# Patient Record
Sex: MALE | Race: WHITE | NOT HISPANIC OR LATINO | Employment: FULL TIME | URBAN - METROPOLITAN AREA
[De-identification: names, ages, dates, MRNs, and addresses within clinical notes are randomized per-mention and may not be internally consistent; named-entity substitution may affect disease eponyms.]

---

## 2017-04-15 ENCOUNTER — HOSPITAL ENCOUNTER (OUTPATIENT)
Dept: URGENT CARE | Facility: CLINIC | Age: 42
Discharge: HOME | End: 2017-04-15
Attending: FAMILY MEDICINE | Admitting: FAMILY MEDICINE

## 2017-06-14 ENCOUNTER — OFFICE (OUTPATIENT)
Dept: URBAN - METROPOLITAN AREA CLINIC 64 | Facility: CLINIC | Age: 42
End: 2017-06-14

## 2017-06-14 VITALS
SYSTOLIC BLOOD PRESSURE: 144 MMHG | DIASTOLIC BLOOD PRESSURE: 94 MMHG | HEART RATE: 91 BPM | HEIGHT: 68 IN | WEIGHT: 234 LBS

## 2017-06-14 DIAGNOSIS — R10.11 RIGHT UPPER QUADRANT PAIN: ICD-10-CM

## 2017-06-14 DIAGNOSIS — K75.81 NONALCOHOLIC STEATOHEPATITIS (NASH): ICD-10-CM

## 2017-06-14 LAB
ACTIN (SMOOTH MUSCLE) ANTIBODY: 9 UNITS (ref 0–19)
AFP, SERUM, TUMOR MARKER: 4.7 NG/ML (ref 0–8.3)
ALPHA-1-ANTITRYPSIN PHENOTYP: ALPHA-1-ANTITRYPSIN, SERUM: 128 MG/DL (ref 90–200)
ALPHA-1-ANTITRYPSIN PHENOTYP: PHENOTYPE (PI): (no result)
ANTINUCLEAR ANTIBODIES, IFA: NEGATIVE
CERULOPLASMIN: 35.2 MG/DL — HIGH (ref 16–31)
COMP. METABOLIC PANEL (14): A/G RATIO: 1.9 (ref 1.2–2.2)
COMP. METABOLIC PANEL (14): ALBUMIN, SERUM: 5 G/DL (ref 3.5–5.5)
COMP. METABOLIC PANEL (14): ALKALINE PHOSPHATASE, S: 56 IU/L (ref 39–117)
COMP. METABOLIC PANEL (14): ALT (SGPT): 58 IU/L — HIGH (ref 0–44)
COMP. METABOLIC PANEL (14): AST (SGOT): 32 IU/L (ref 0–40)
COMP. METABOLIC PANEL (14): BILIRUBIN, TOTAL: 0.3 MG/DL (ref 0–1.2)
COMP. METABOLIC PANEL (14): BUN/CREATININE RATIO: 13 (ref 9–20)
COMP. METABOLIC PANEL (14): BUN: 11 MG/DL (ref 6–24)
COMP. METABOLIC PANEL (14): CALCIUM, SERUM: 10.1 MG/DL (ref 8.7–10.2)
COMP. METABOLIC PANEL (14): CARBON DIOXIDE, TOTAL: 23 MMOL/L (ref 18–29)
COMP. METABOLIC PANEL (14): CHLORIDE, SERUM: 96 MMOL/L (ref 96–106)
COMP. METABOLIC PANEL (14): CREATININE, SERUM: 0.86 MG/DL (ref 0.76–1.27)
COMP. METABOLIC PANEL (14): EGFR IF AFRICN AM: 125 ML/MIN/1.73 (ref 59–?)
COMP. METABOLIC PANEL (14): EGFR IF NONAFRICN AM: 108 ML/MIN/1.73 (ref 59–?)
COMP. METABOLIC PANEL (14): GLOBULIN, TOTAL: 2.7 G/DL (ref 1.5–4.5)
COMP. METABOLIC PANEL (14): GLUCOSE, SERUM: 84 MG/DL (ref 65–99)
COMP. METABOLIC PANEL (14): POTASSIUM, SERUM: 4.3 MMOL/L (ref 3.5–5.2)
COMP. METABOLIC PANEL (14): PROTEIN, TOTAL, SERUM: 7.7 G/DL (ref 6–8.5)
COMP. METABOLIC PANEL (14): SODIUM, SERUM: 140 MMOL/L (ref 134–144)
FERRITIN, SERUM: 466 NG/ML — HIGH (ref 30–400)
GGT: 70 IU/L — HIGH (ref 0–65)
HEPATITIS PANEL (4): HBSAG SCREEN: NEGATIVE
HEPATITIS PANEL (4): HEP A AB, IGM: NEGATIVE
HEPATITIS PANEL (4): HEP B CORE AB, IGM: NEGATIVE
HEPATITIS PANEL (4): HEP C VIRUS AB: <0.1 S/CO RATIO
IMMUNOGLOBULINS A/G/M, QN, SER: IMMUNOGLOBULIN A, QN, SERUM: 463 MG/DL — HIGH (ref 90–386)
IMMUNOGLOBULINS A/G/M, QN, SER: IMMUNOGLOBULIN G, QN, SERUM: 647 MG/DL — LOW (ref 700–1600)
IMMUNOGLOBULINS A/G/M, QN, SER: IMMUNOGLOBULIN M, QN, SERUM: 83 MG/DL (ref 20–172)
IRON AND TIBC: IRON BIND.CAP.(TIBC): 381 UG/DL (ref 250–450)
IRON AND TIBC: IRON SATURATION: 19 % (ref 15–55)
IRON AND TIBC: IRON, SERUM: 71 UG/DL (ref 38–169)
IRON AND TIBC: UIBC: 310 UG/DL (ref 111–343)
LP+NON-HDL CHOLESTEROL: CHOLESTEROL, TOTAL: 185 MG/DL (ref 100–199)
LP+NON-HDL CHOLESTEROL: COMMENT: (no result)
LP+NON-HDL CHOLESTEROL: HDL CHOLESTEROL: 25 MG/DL — LOW (ref 39–?)
LP+NON-HDL CHOLESTEROL: LDL CHOLESTEROL CALC: 106 MG/DL — HIGH (ref 0–99)
LP+NON-HDL CHOLESTEROL: NON-HDL CHOLESTEROL: 160 MG/DL — HIGH (ref 0–129)
LP+NON-HDL CHOLESTEROL: TRIGLYCERIDES: 269 MG/DL — HIGH (ref 0–149)
LP+NON-HDL CHOLESTEROL: VLDL CHOLESTEROL CAL: 54 MG/DL — HIGH (ref 5–40)
MITOCHONDRIAL (M2) ANTIBODY: <20 UNITS
PROTHROMBIN TIME (PT): INR: 1 (ref 0.8–1.2)
PROTHROMBIN TIME (PT): PROTHROMBIN TIME: 10.4 SEC (ref 9.1–12)
TSH: 3.39 UIU/ML (ref 0.45–4.5)

## 2017-06-14 PROCEDURE — 99243 OFF/OP CNSLTJ NEW/EST LOW 30: CPT | Performed by: INTERNAL MEDICINE

## 2017-06-14 RX ORDER — ESOMEPRAZOLE MAGNESIUM 20 MG/1
22.3 CAPSULE, DELAYED RELEASE ORAL
Qty: 90 | Refills: 5 | Status: COMPLETED
End: 2022-06-27

## 2017-06-16 ENCOUNTER — HOSPITAL ENCOUNTER (OUTPATIENT)
Dept: NUCLEAR MEDICINE | Facility: HOSPITAL | Age: 42
Discharge: HOME | End: 2017-06-16
Attending: INTERNAL MEDICINE | Admitting: INTERNAL MEDICINE

## 2017-07-17 ENCOUNTER — OFFICE (OUTPATIENT)
Dept: URBAN - METROPOLITAN AREA CLINIC 64 | Facility: CLINIC | Age: 42
End: 2017-07-17

## 2017-07-17 VITALS
HEART RATE: 86 BPM | SYSTOLIC BLOOD PRESSURE: 146 MMHG | HEIGHT: 68 IN | DIASTOLIC BLOOD PRESSURE: 92 MMHG | WEIGHT: 237 LBS

## 2017-07-17 DIAGNOSIS — K81.9 CHOLECYSTITIS, UNSPECIFIED: ICD-10-CM

## 2017-07-17 DIAGNOSIS — K75.81 NONALCOHOLIC STEATOHEPATITIS (NASH): ICD-10-CM

## 2017-07-17 DIAGNOSIS — R10.11 RIGHT UPPER QUADRANT PAIN: ICD-10-CM

## 2017-07-17 PROCEDURE — 99213 OFFICE O/P EST LOW 20 MIN: CPT | Performed by: INTERNAL MEDICINE

## 2017-07-28 ENCOUNTER — HOSPITAL ENCOUNTER (OUTPATIENT)
Dept: OTHER | Facility: HOSPITAL | Age: 42
Discharge: HOME | End: 2017-07-28
Attending: SURGERY | Admitting: SURGERY

## 2017-07-28 LAB
ANION GAP SERPL CALC-SCNC: 13.3 MMOL/L (ref 10–20)
BUN SERPL-MCNC: 13 MG/DL (ref 8–20)
BUN/CREAT SERPL: 16.3 (ref 6.2–20.3)
CALCIUM SERPL-MCNC: 9.8 MG/DL (ref 8.9–10.3)
CHLORIDE SERPL-SCNC: 102 MMOL/L (ref 101–111)
CONV CO2: 27 MMOL/L (ref 22–32)
CREAT UR-MCNC: 0.8 MG/DL (ref 0.7–1.2)
GLUCOSE SERPL-MCNC: 89 MG/DL (ref 65–99)
POTASSIUM SERPL-SCNC: 4.3 MMOL/L (ref 3.6–5.1)
SODIUM SERPL-SCNC: 138 MMOL/L (ref 136–144)

## 2017-08-10 ENCOUNTER — HOSPITAL ENCOUNTER (OUTPATIENT)
Dept: OTHER | Facility: HOSPITAL | Age: 42
Setting detail: SPECIMEN
Discharge: HOME | End: 2017-08-10
Attending: SURGERY | Admitting: SURGERY

## 2017-10-26 ENCOUNTER — HOSPITAL ENCOUNTER (OUTPATIENT)
Dept: FAMILY MEDICINE CLINIC | Facility: CLINIC | Age: 42
Setting detail: SPECIMEN
Discharge: HOME | End: 2017-10-26
Attending: INTERNAL MEDICINE | Admitting: INTERNAL MEDICINE

## 2017-10-26 LAB
ALBUMIN SERPL-MCNC: 3.7 G/DL (ref 3.5–4.8)
ALBUMIN/GLOB SERPL: 1.2 {RATIO} (ref 1–1.7)
ALP SERPL-CCNC: 41 IU/L (ref 32–91)
ALT SERPL-CCNC: 70 IU/L (ref 17–63)
ANION GAP SERPL CALC-SCNC: 14.6 MMOL/L (ref 10–20)
AST SERPL-CCNC: 47 IU/L (ref 15–41)
BILIRUB SERPL-MCNC: 0.5 MG/DL (ref 0.3–1.2)
BUN SERPL-MCNC: 14 MG/DL (ref 8–20)
BUN/CREAT SERPL: 10 (ref 6.2–20.3)
CALCIUM SERPL-MCNC: 9.3 MG/DL (ref 8.9–10.3)
CHLORIDE SERPL-SCNC: 103 MMOL/L (ref 101–111)
CK SERPL-CCNC: 51 IU/L (ref 49–397)
CONV CO2: 27 MMOL/L (ref 22–32)
CONV TOTAL PROTEIN: 6.9 G/DL (ref 6.1–7.9)
CREAT UR-MCNC: 1.4 MG/DL (ref 0.7–1.2)
FOLATE SERPL-MCNC: 19.3 NG/ML (ref 5.9–24.8)
GLOBULIN UR ELPH-MCNC: 3.2 G/DL (ref 2.5–3.8)
GLUCOSE SERPL-MCNC: 86 MG/DL (ref 65–99)
MAGNESIUM SERPL-MCNC: 2 MG/DL (ref 1.8–2.5)
POTASSIUM SERPL-SCNC: 4.6 MMOL/L (ref 3.6–5.1)
SODIUM SERPL-SCNC: 140 MMOL/L (ref 136–144)
T3 SERPL-MCNC: 1.18 NG/ML (ref 0.87–1.78)
T4 FREE SERPL-MCNC: 0.89 NG/DL (ref 0.58–1.64)
TSH SERPL-ACNC: 3.64 UIU/ML (ref 0.34–5.6)
VIT B12 SERPL-MCNC: 265 PG/ML (ref 180–914)

## 2018-06-22 ENCOUNTER — HOSPITAL ENCOUNTER (OUTPATIENT)
Dept: FAMILY MEDICINE CLINIC | Facility: CLINIC | Age: 43
Setting detail: SPECIMEN
Discharge: HOME | End: 2018-06-22
Attending: INTERNAL MEDICINE | Admitting: INTERNAL MEDICINE

## 2018-06-22 LAB
ANION GAP SERPL CALC-SCNC: 12.2 MMOL/L (ref 10–20)
BUN SERPL-MCNC: 13 MG/DL (ref 8–20)
BUN/CREAT SERPL: 14.4 (ref 6.2–20.3)
CALCIUM SERPL-MCNC: 10 MG/DL (ref 8.9–10.3)
CHLORIDE SERPL-SCNC: 100 MMOL/L (ref 101–111)
CONV CO2: 28 MMOL/L (ref 22–32)
CREAT UR-MCNC: 0.9 MG/DL (ref 0.7–1.2)
GLUCOSE SERPL-MCNC: 90 MG/DL (ref 65–99)
POTASSIUM SERPL-SCNC: 4.2 MMOL/L (ref 3.6–5.1)
SODIUM SERPL-SCNC: 136 MMOL/L (ref 136–144)
URATE SERPL-MCNC: 6.6 MG/DL (ref 4.8–8.7)

## 2018-09-20 ENCOUNTER — HOSPITAL ENCOUNTER (OUTPATIENT)
Dept: FAMILY MEDICINE CLINIC | Facility: CLINIC | Age: 43
Setting detail: SPECIMEN
Discharge: HOME | End: 2018-09-20
Attending: INTERNAL MEDICINE | Admitting: INTERNAL MEDICINE

## 2018-09-20 LAB
ALBUMIN SERPL-MCNC: 4.1 G/DL (ref 3.5–4.8)
ALBUMIN/GLOB SERPL: 1.5 {RATIO} (ref 1–1.7)
ALP SERPL-CCNC: 51 IU/L (ref 32–91)
ALT SERPL-CCNC: 54 IU/L (ref 17–63)
ANION GAP SERPL CALC-SCNC: 14.1 MMOL/L (ref 10–20)
AST SERPL-CCNC: 38 IU/L (ref 15–41)
BASOPHILS # BLD AUTO: 0.1 10*3/UL (ref 0–0.2)
BASOPHILS NFR BLD AUTO: 1 % (ref 0–2)
BILIRUB SERPL-MCNC: 0.5 MG/DL (ref 0.3–1.2)
BUN SERPL-MCNC: 12 MG/DL (ref 8–20)
BUN/CREAT SERPL: 15 (ref 6.2–20.3)
CALCIUM SERPL-MCNC: 9.5 MG/DL (ref 8.9–10.3)
CHLORIDE SERPL-SCNC: 99 MMOL/L (ref 101–111)
CHOLEST SERPL-MCNC: 175 MG/DL
CHOLEST/HDLC SERPL: 6.3 {RATIO}
CONV CO2: 27 MMOL/L (ref 22–32)
CONV LDL CHOLESTEROL DIRECT: 122 MG/DL (ref 0–100)
CONV TOTAL PROTEIN: 6.9 G/DL (ref 6.1–7.9)
CREAT UR-MCNC: 0.8 MG/DL (ref 0.7–1.2)
DIFFERENTIAL METHOD BLD: (no result)
EOSINOPHIL # BLD AUTO: 0.2 10*3/UL (ref 0–0.3)
EOSINOPHIL # BLD AUTO: 2 % (ref 0–3)
ERYTHROCYTE [DISTWIDTH] IN BLOOD BY AUTOMATED COUNT: 12.8 % (ref 11.5–14.5)
GLOBULIN UR ELPH-MCNC: 2.8 G/DL (ref 2.5–3.8)
GLUCOSE SERPL-MCNC: 96 MG/DL (ref 65–99)
HCT VFR BLD AUTO: 44.1 % (ref 40–54)
HDLC SERPL-MCNC: 28 MG/DL
HGB BLD-MCNC: 15.2 G/DL (ref 14–18)
LDLC/HDLC SERPL: 4.4 {RATIO}
LIPID INTERPRETATION: ABNORMAL
LYMPHOCYTES # BLD AUTO: 2.1 10*3/UL (ref 0.8–4.8)
LYMPHOCYTES NFR BLD AUTO: 22 % (ref 18–42)
MCH RBC QN AUTO: 30.9 PG (ref 26–32)
MCHC RBC AUTO-ENTMCNC: 34.4 G/DL (ref 32–36)
MCV RBC AUTO: 89.8 FL (ref 80–94)
MONOCYTES # BLD AUTO: 0.8 10*3/UL (ref 0.1–1.3)
MONOCYTES NFR BLD AUTO: 8 % (ref 2–11)
NEUTROPHILS # BLD AUTO: 6.6 10*3/UL (ref 2.3–8.6)
NEUTROPHILS NFR BLD AUTO: 67 % (ref 50–75)
NRBC BLD AUTO-RTO: 0 /100{WBCS}
NRBC/RBC NFR BLD MANUAL: 0 10*3/UL
PLATELET # BLD AUTO: 352 10*3/UL (ref 150–450)
PMV BLD AUTO: 8.1 FL (ref 7.4–10.4)
POTASSIUM SERPL-SCNC: 4.1 MMOL/L (ref 3.6–5.1)
RBC # BLD AUTO: 4.91 10*6/UL (ref 4.6–6)
SODIUM SERPL-SCNC: 136 MMOL/L (ref 136–144)
TRIGL SERPL-MCNC: 266 MG/DL
URATE SERPL-MCNC: 6.1 MG/DL (ref 4.8–8.7)
VLDLC SERPL CALC-MCNC: 25.3 MG/DL
WBC # BLD AUTO: 9.7 10*3/UL (ref 4.5–11.5)

## 2019-01-11 ENCOUNTER — HOSPITAL ENCOUNTER (OUTPATIENT)
Dept: URGENT CARE | Facility: CLINIC | Age: 44
Discharge: HOME | End: 2019-01-11
Attending: FAMILY MEDICINE | Admitting: FAMILY MEDICINE

## 2019-07-15 RX ORDER — ALLOPURINOL 300 MG/1
TABLET ORAL
Qty: 180 TABLET | Refills: 0 | Status: SHIPPED | OUTPATIENT
Start: 2019-07-15 | End: 2019-11-07 | Stop reason: SDUPTHER

## 2019-08-21 RX ORDER — GABAPENTIN 100 MG/1
CAPSULE ORAL
Qty: 60 CAPSULE | Refills: 1 | Status: SHIPPED | OUTPATIENT
Start: 2019-08-21 | End: 2019-11-07 | Stop reason: SDUPTHER

## 2019-08-21 RX ORDER — MELOXICAM 15 MG/1
TABLET ORAL
Qty: 30 TABLET | Refills: 4 | Status: SHIPPED | OUTPATIENT
Start: 2019-08-21 | End: 2020-07-14

## 2019-09-23 RX ORDER — LEVOTHYROXINE SODIUM 0.05 MG/1
TABLET ORAL
Qty: 30 TABLET | Refills: 3 | Status: SHIPPED | OUTPATIENT
Start: 2019-09-23 | End: 2020-02-12

## 2019-11-06 RX ORDER — GABAPENTIN 100 MG/1
CAPSULE ORAL
Qty: 60 CAPSULE | Refills: 0 | OUTPATIENT
Start: 2019-11-06

## 2019-11-06 RX ORDER — LISINOPRIL AND HYDROCHLOROTHIAZIDE 25; 20 MG/1; MG/1
TABLET ORAL
Qty: 90 TABLET | Refills: 0 | OUTPATIENT
Start: 2019-11-06

## 2019-11-06 RX ORDER — ALLOPURINOL 300 MG/1
TABLET ORAL
Qty: 180 TABLET | Refills: 0 | OUTPATIENT
Start: 2019-11-06

## 2019-11-06 NOTE — TELEPHONE ENCOUNTER
Patient was called to schedule appt. Patient states that he is out of town for work for the next 2 weeks and is completely out of all three meds. Patient is asking if scripts can be sent to pharmacy for girlfriend to  and mail to him. Patient states that he will call when he is back in town to schedule appt.

## 2019-11-07 RX ORDER — LISINOPRIL AND HYDROCHLOROTHIAZIDE 25; 20 MG/1; MG/1
1 TABLET ORAL DAILY
Qty: 90 TABLET | Refills: 0 | Status: SHIPPED | OUTPATIENT
Start: 2019-11-07 | End: 2020-02-03

## 2019-11-07 RX ORDER — ALLOPURINOL 300 MG/1
600 TABLET ORAL DAILY
Qty: 180 TABLET | Refills: 0 | Status: SHIPPED | OUTPATIENT
Start: 2019-11-07 | End: 2020-02-03

## 2019-11-07 RX ORDER — GABAPENTIN 100 MG/1
100-200 CAPSULE ORAL
Qty: 180 CAPSULE | Refills: 0 | Status: SHIPPED | OUTPATIENT
Start: 2019-11-07 | End: 2020-02-03

## 2019-12-05 ENCOUNTER — TELEPHONE (OUTPATIENT)
Dept: FAMILY MEDICINE CLINIC | Facility: CLINIC | Age: 44
End: 2019-12-05

## 2019-12-05 NOTE — TELEPHONE ENCOUNTER
Does he need to be seen sooner than that? I do want to see him - he's overdue for labs, but as long as he's feeling fine, luciana montanez is fine for follow up visit

## 2019-12-05 NOTE — TELEPHONE ENCOUNTER
Patient said you wanted to see him for a follow up on medications.  Your first available appt, not same day, is 1/14.  Can you see him sooner than that?

## 2019-12-09 NOTE — TELEPHONE ENCOUNTER
Spoke with patient at 5:14pm.  He said he still has the same problem - he cannot feel his feet.  Has been that way for several years.  Can you see him sooner than mid-January?

## 2019-12-09 NOTE — TELEPHONE ENCOUNTER
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

## 2019-12-10 NOTE — TELEPHONE ENCOUNTER
Dr Lucas had an opening on Thursday 12/12 at 1:15pm so I called patient and he wanted that appointment.

## 2019-12-12 ENCOUNTER — OFFICE VISIT (OUTPATIENT)
Dept: FAMILY MEDICINE CLINIC | Facility: CLINIC | Age: 44
End: 2019-12-12

## 2019-12-12 ENCOUNTER — LAB (OUTPATIENT)
Dept: FAMILY MEDICINE CLINIC | Facility: CLINIC | Age: 44
End: 2019-12-12

## 2019-12-12 VITALS
SYSTOLIC BLOOD PRESSURE: 110 MMHG | RESPIRATION RATE: 24 BRPM | BODY MASS INDEX: 33.77 KG/M2 | DIASTOLIC BLOOD PRESSURE: 70 MMHG | OXYGEN SATURATION: 97 % | WEIGHT: 228 LBS | HEART RATE: 98 BPM | TEMPERATURE: 98.3 F | HEIGHT: 69 IN

## 2019-12-12 DIAGNOSIS — Z12.5 PROSTATE CANCER SCREENING: ICD-10-CM

## 2019-12-12 DIAGNOSIS — E03.9 HYPOTHYROIDISM, UNSPECIFIED TYPE: ICD-10-CM

## 2019-12-12 DIAGNOSIS — G62.9 PERIPHERAL POLYNEUROPATHY: ICD-10-CM

## 2019-12-12 DIAGNOSIS — I10 ESSENTIAL HYPERTENSION: Primary | ICD-10-CM

## 2019-12-12 DIAGNOSIS — E55.9 VITAMIN D DEFICIENCY: ICD-10-CM

## 2019-12-12 DIAGNOSIS — M1A.09X0 CHRONIC GOUT OF MULTIPLE SITES, UNSPECIFIED CAUSE: ICD-10-CM

## 2019-12-12 DIAGNOSIS — R73.03 PREDIABETES: ICD-10-CM

## 2019-12-12 DIAGNOSIS — I10 ESSENTIAL HYPERTENSION: ICD-10-CM

## 2019-12-12 LAB
25(OH)D3 SERPL-MCNC: 26.7 NG/ML (ref 30–100)
ALBUMIN SERPL-MCNC: 5.1 G/DL (ref 3.5–5.2)
ALBUMIN/GLOB SERPL: 1.4 G/DL
ALP SERPL-CCNC: 62 U/L (ref 39–117)
ALT SERPL W P-5'-P-CCNC: 64 U/L (ref 1–41)
ANION GAP SERPL CALCULATED.3IONS-SCNC: 17.3 MMOL/L (ref 5–15)
AST SERPL-CCNC: 42 U/L (ref 1–40)
BASOPHILS # BLD AUTO: 0.04 10*3/MM3 (ref 0–0.2)
BASOPHILS NFR BLD AUTO: 0.4 % (ref 0–1.5)
BILIRUB SERPL-MCNC: 0.3 MG/DL (ref 0.2–1.2)
BUN BLD-MCNC: 14 MG/DL (ref 6–20)
BUN/CREAT SERPL: 15.4 (ref 7–25)
CALCIUM SPEC-SCNC: 10.4 MG/DL (ref 8.6–10.5)
CHLORIDE SERPL-SCNC: 95 MMOL/L (ref 98–107)
CHOLEST SERPL-MCNC: 199 MG/DL (ref 0–200)
CO2 SERPL-SCNC: 25.7 MMOL/L (ref 22–29)
CREAT BLD-MCNC: 0.91 MG/DL (ref 0.76–1.27)
DEPRECATED RDW RBC AUTO: 40.5 FL (ref 37–54)
EOSINOPHIL # BLD AUTO: 0.16 10*3/MM3 (ref 0–0.4)
EOSINOPHIL NFR BLD AUTO: 1.6 % (ref 0.3–6.2)
ERYTHROCYTE [DISTWIDTH] IN BLOOD BY AUTOMATED COUNT: 12.3 % (ref 12.3–15.4)
FOLATE SERPL-MCNC: 15.3 NG/ML (ref 4.78–24.2)
GFR SERPL CREATININE-BSD FRML MDRD: 91 ML/MIN/1.73
GLOBULIN UR ELPH-MCNC: 3.7 GM/DL
GLUCOSE BLD-MCNC: 89 MG/DL (ref 65–99)
HBA1C MFR BLD: 5.6 % (ref 3.5–5.6)
HCT VFR BLD AUTO: 46.2 % (ref 37.5–51)
HDLC SERPL-MCNC: 31 MG/DL (ref 40–60)
HGB BLD-MCNC: 16.2 G/DL (ref 13–17.7)
IMM GRANULOCYTES # BLD AUTO: 0.09 10*3/MM3 (ref 0–0.05)
IMM GRANULOCYTES NFR BLD AUTO: 0.9 % (ref 0–0.5)
LDLC SERPL CALC-MCNC: 106 MG/DL (ref 0–100)
LDLC/HDLC SERPL: 3.43 {RATIO}
LYMPHOCYTES # BLD AUTO: 2.33 10*3/MM3 (ref 0.7–3.1)
LYMPHOCYTES NFR BLD AUTO: 23.1 % (ref 19.6–45.3)
MCH RBC QN AUTO: 31.9 PG (ref 26.6–33)
MCHC RBC AUTO-ENTMCNC: 35.1 G/DL (ref 31.5–35.7)
MCV RBC AUTO: 90.9 FL (ref 79–97)
MONOCYTES # BLD AUTO: 0.64 10*3/MM3 (ref 0.1–0.9)
MONOCYTES NFR BLD AUTO: 6.3 % (ref 5–12)
NEUTROPHILS # BLD AUTO: 6.82 10*3/MM3 (ref 1.7–7)
NEUTROPHILS NFR BLD AUTO: 67.7 % (ref 42.7–76)
NRBC BLD AUTO-RTO: 0 /100 WBC (ref 0–0.2)
PLATELET # BLD AUTO: 431 10*3/MM3 (ref 140–450)
PMV BLD AUTO: 10.1 FL (ref 6–12)
POTASSIUM BLD-SCNC: 4.3 MMOL/L (ref 3.5–5.2)
PROT SERPL-MCNC: 8.8 G/DL (ref 6–8.5)
PSA SERPL-MCNC: 0.58 NG/ML (ref 0–4)
RBC # BLD AUTO: 5.08 10*6/MM3 (ref 4.14–5.8)
SODIUM BLD-SCNC: 138 MMOL/L (ref 136–145)
T4 FREE SERPL-MCNC: 1.51 NG/DL (ref 0.93–1.7)
TRIGL SERPL-MCNC: 309 MG/DL (ref 0–150)
TSH SERPL DL<=0.05 MIU/L-ACNC: 3.43 UIU/ML (ref 0.27–4.2)
URATE SERPL-MCNC: 5.9 MG/DL (ref 3.4–7)
VIT B12 BLD-MCNC: >2000 PG/ML (ref 211–946)
VLDLC SERPL-MCNC: 61.8 MG/DL (ref 5–40)
WBC NRBC COR # BLD: 10.08 10*3/MM3 (ref 3.4–10.8)

## 2019-12-12 PROCEDURE — 82306 VITAMIN D 25 HYDROXY: CPT | Performed by: INTERNAL MEDICINE

## 2019-12-12 PROCEDURE — G0103 PSA SCREENING: HCPCS | Performed by: INTERNAL MEDICINE

## 2019-12-12 PROCEDURE — 80053 COMPREHEN METABOLIC PANEL: CPT | Performed by: INTERNAL MEDICINE

## 2019-12-12 PROCEDURE — 82607 VITAMIN B-12: CPT | Performed by: INTERNAL MEDICINE

## 2019-12-12 PROCEDURE — 36415 COLL VENOUS BLD VENIPUNCTURE: CPT

## 2019-12-12 PROCEDURE — 84439 ASSAY OF FREE THYROXINE: CPT | Performed by: INTERNAL MEDICINE

## 2019-12-12 PROCEDURE — 99214 OFFICE O/P EST MOD 30 MIN: CPT | Performed by: INTERNAL MEDICINE

## 2019-12-12 PROCEDURE — 82746 ASSAY OF FOLIC ACID SERUM: CPT | Performed by: INTERNAL MEDICINE

## 2019-12-12 PROCEDURE — 80061 LIPID PANEL: CPT | Performed by: INTERNAL MEDICINE

## 2019-12-12 PROCEDURE — 84550 ASSAY OF BLOOD/URIC ACID: CPT | Performed by: INTERNAL MEDICINE

## 2019-12-12 PROCEDURE — 85025 COMPLETE CBC W/AUTO DIFF WBC: CPT | Performed by: INTERNAL MEDICINE

## 2019-12-12 PROCEDURE — 84443 ASSAY THYROID STIM HORMONE: CPT | Performed by: INTERNAL MEDICINE

## 2019-12-12 PROCEDURE — 83036 HEMOGLOBIN GLYCOSYLATED A1C: CPT | Performed by: INTERNAL MEDICINE

## 2019-12-12 RX ORDER — CHLORAL HYDRATE 500 MG
CAPSULE ORAL
COMMUNITY
End: 2021-01-14

## 2019-12-12 NOTE — PROGRESS NOTES
Subjective   Timo Hector is a 43 y.o. male.     Pt is here for med check of hypothyroidism, htn, peripheral neuropathy, gout  Due for labs today  Did see podiatry  Got shoe inserts - that helped foot pain, but not neuropathy  Seems to be a little worse than before  Not sure if gabapentin helps  Takes it at bedtime so doesn't know if it makes him sleepy  Does have some fatigue, which he attributes to working a lot         The following portions of the patient's history were reviewed and updated as appropriate: allergies, current medications, past family history, past medical history, past social history, past surgical history and problem list.    Review of Systems   Constitutional: Positive for fatigue. Negative for fever.   HENT: Negative for congestion, ear pain, rhinorrhea and sore throat.    Eyes: Negative for blurred vision and itching.   Respiratory: Negative for cough and shortness of breath.    Cardiovascular: Negative for chest pain and palpitations.   Gastrointestinal: Negative for abdominal pain, diarrhea and vomiting.   Endocrine: Negative for polydipsia and polyuria.   Genitourinary: Negative for dysuria, frequency, hematuria and urgency.   Musculoskeletal: Negative for joint swelling and myalgias.   Skin: Negative for rash and skin lesions.   Neurological: Positive for numbness. Negative for dizziness and headache.   Psychiatric/Behavioral: Negative for depressed mood. The patient is not nervous/anxious.          Current Outpatient Medications:   •  allopurinol (ZYLOPRIM) 300 MG tablet, Take 2 tablets by mouth Daily., Disp: 180 tablet, Rfl: 0  •  esomeprazole (nexIUM 24HR) 20 MG capsule, NEXIUM 24HR 20 MG CPDR, Disp: , Rfl:   •  gabapentin (NEURONTIN) 100 MG capsule, Take 1-2 capsules by mouth every night at bedtime., Disp: 180 capsule, Rfl: 0  •  Garlic 100 MG tablet, Take  by mouth., Disp: , Rfl:   •  DORIS BERRY PO, Take  by mouth., Disp: , Rfl:   •  levothyroxine (SYNTHROID, LEVOTHROID)  "50 MCG tablet, TAKE ONE TABLET BY MOUTH DAILY, Disp: 30 tablet, Rfl: 3  •  lisinopril-hydrochlorothiazide (PRINZIDE,ZESTORETIC) 20-25 MG per tablet, Take 1 tablet by mouth Daily., Disp: 90 tablet, Rfl: 0  •  meloxicam (MOBIC) 15 MG tablet, TAKE ONE TABLET BY MOUTH DAILY, Disp: 30 tablet, Rfl: 4  •  Omega-3 Fatty Acids (FISH OIL) 1000 MG capsule capsule, Take  by mouth Daily With Breakfast., Disp: , Rfl:     Objective   /70 (BP Location: Right arm, Patient Position: Sitting, Cuff Size: Large Adult)   Pulse 98   Temp 98.3 °F (36.8 °C) (Oral)   Resp 24   Ht 174 cm (68.5\")   Wt 103 kg (228 lb)   SpO2 97%   BMI 34.16 kg/m²   Physical Exam   Constitutional: He is oriented to person, place, and time. He appears well-developed and well-nourished. No distress.   HENT:   Head: Normocephalic and atraumatic.   Right Ear: External ear normal.   Left Ear: External ear normal.   Mouth/Throat: Oropharynx is clear and moist. No oropharyngeal exudate.   Eyes: Conjunctivae and EOM are normal. Right eye exhibits no discharge. Left eye exhibits no discharge. No scleral icterus.   Neck: Normal range of motion. Neck supple. No thyromegaly present.   Cardiovascular: Normal rate, regular rhythm and normal heart sounds. Exam reveals no gallop and no friction rub.   No murmur heard.  Pulmonary/Chest: Effort normal and breath sounds normal. No respiratory distress. He has no wheezes. He has no rales.   Abdominal: Soft. Bowel sounds are normal. He exhibits no distension. There is no tenderness. There is no guarding.   Musculoskeletal: He exhibits no edema or deformity.   Decreased ROM of lumbar spine   Lymphadenopathy:     He has no cervical adenopathy.   Neurological: He is alert and oriented to person, place, and time. No cranial nerve deficit.   Skin: Skin is warm and dry. No rash noted. He is not diaphoretic. No erythema.   Psychiatric: He has a normal mood and affect. His behavior is normal. Thought content normal.   Vitals " reviewed.        Assessment/Plan   Problems Addressed this Visit     None      Visit Diagnoses     Essential hypertension    -  Primary    Relevant Orders    CBC Auto Differential (Completed)    Comprehensive Metabolic Panel (Completed)    Lipid Panel (Completed)    Chronic gout of multiple sites, unspecified cause        Relevant Orders    Uric acid (Completed)    Prediabetes        Relevant Orders    Hemoglobin A1c (Completed)    Peripheral polyneuropathy        Relevant Orders    Vitamin B12 (Completed)    Folate (Completed)    Ambulatory Referral to Physical Therapy    EMG 2 Limbs    XR Spine Lumbar 2 or 3 View    XR Foot 3+ View Left    XR Foot 3+ View Right    Hypothyroidism, unspecified type        Relevant Orders    TSH (Completed)    T4, free (Completed)    Vitamin D deficiency        Relevant Orders    Vitamin D 25 Hydroxy (Completed)    Prostate cancer screening        Relevant Orders    PSA Screen (Completed)        Will order xrays and emg to eval peripheral neuropathy  Pt does have some back problems, usually sees chiro to help  But hasn't been in a while bc of work  Will try increasing gabapentin to help with sxs while undergoing w/u  If evaluation is normal, consider referral to neuro  Check labs today  bp controlled - cont lisinopril/hctz       Procedures

## 2019-12-16 NOTE — PROGRESS NOTES
Gave pt results. He states he was told a few years ago he has a fatty liver and a biopsy 2-3 years ago. Pt states he no longer drinks alcohol and was told to watch diet.

## 2019-12-27 ENCOUNTER — HOSPITAL ENCOUNTER (OUTPATIENT)
Dept: NEUROLOGY | Facility: HOSPITAL | Age: 44
Discharge: HOME | End: 2019-12-27
Admitting: INTERNAL MEDICINE

## 2019-12-27 DIAGNOSIS — G62.9 PERIPHERAL POLYNEUROPATHY: ICD-10-CM

## 2019-12-27 PROCEDURE — 95909 NRV CNDJ TST 5-6 STUDIES: CPT | Performed by: PSYCHIATRY & NEUROLOGY

## 2019-12-27 PROCEDURE — 95886 MUSC TEST DONE W/N TEST COMP: CPT

## 2019-12-27 PROCEDURE — 95886 MUSC TEST DONE W/N TEST COMP: CPT | Performed by: PSYCHIATRY & NEUROLOGY

## 2019-12-27 PROCEDURE — 95909 NRV CNDJ TST 5-6 STUDIES: CPT

## 2019-12-27 NOTE — PROCEDURES
EMG REPORT    Indication: Numbness of both lower extremities    Findings: Left peroneal motor study showed normal distal latency small amplitude response of 500 µV and a velocity of 33.2 m/s.  Right peroneal bilateral tibial motor study showed no reliable response with distal stimulation.  Left sural sensory study showed no reliable response.    Concentric needle EMG of bilateral vastus lateralis, vastus medialis, anterior tibialis, peroneus and gastrocnemius muscles showed no abnormality.    Impression: Nerve conduction studies are consistent with a severe diffuse polyneuropathy.  Needle EMG of both lower extremities failed to show evidence of superimposed lumbosacral radiculopathy.       Fernando Tellez M.D.

## 2020-01-02 ENCOUNTER — TREATMENT (OUTPATIENT)
Dept: PHYSICAL THERAPY | Facility: CLINIC | Age: 45
End: 2020-01-02

## 2020-01-02 DIAGNOSIS — G62.9 PERIPHERAL POLYNEUROPATHY: Primary | ICD-10-CM

## 2020-01-02 PROCEDURE — 97026 INFRARED THERAPY: CPT | Performed by: PHYSICAL THERAPIST

## 2020-01-02 PROCEDURE — 97110 THERAPEUTIC EXERCISES: CPT | Performed by: PHYSICAL THERAPIST

## 2020-01-02 PROCEDURE — 97162 PT EVAL MOD COMPLEX 30 MIN: CPT | Performed by: PHYSICAL THERAPIST

## 2020-01-02 NOTE — PROGRESS NOTES
"Physical Therapy Initial Evaluation and Plan of Care    Patient: Timo Hector   : 1975  Diagnosis/ICD-10 Code:  Peripheral polyneuropathy [G62.9]  Referring practitioner: Tata Lucas MD  Date of Initial Visit: 2020  Today's Date: 2020  Patient seen for 1 sessions             Subjective 45 yo male with c/o bilateral foot pain and numbness. Pt with insidious onset of symptoms 5-6 years ago. Pt also notes episodes of his R knee \"catching.\" Pt describes stocking pattern. He is taking Neurontin but is unsure if this is helping. Pt reports he is prediabetic but does not think he has seen an endocrinologist.  MD follow up: March  Social hx: Travels for \"bridgework\"      Objective       Neurological Testing     Sensation     Lumbar   Left   Diminished: light touch    Right   Diminished: light touch    Active Range of Motion     Lumbar   Normal active range of motion  Left Ankle/Foot   Dorsiflexion (ke): 5 degrees     Right Ankle/Foot   Dorsiflexion (ke): 12 degrees     Strength/Myotome Testing     Lumbar   Left   Normal strength    Right   Normal strength    Left Ankle/Foot   Normal strength    Right Ankle/Foot   Normal strength    Tests     Lumbar     Left   Negative passive SLR.     Right   Negative passive SLR.     Ambulation     Observational Gait   Decreased walking speed and stride length.   Base of support: increased     Unable to single leg stand with eyes closed, eyes open wfl.  FAAM: 63%    Assessment & Plan     Assessment  Impairments: abnormal gait, abnormal or restricted ROM, impaired balance and lacks appropriate home exercise program  Other impairment: Impaired sensation  Assessment details: 45 yo male with c/o bilateral foot pain and numbness. Pt is with a good response to treatment this date and would benefit from further evaluation and treatment to address the above impairments.  Prognosis: fair  Functional Limitations: walking, pulling, pushing, uncomfortable because of pain " and standing  Goals  Plan Goals: Short term goals, 1 week: Tolerate HEP progression.  Voice compliance with activity modification.  Report improvement in symptoms.    LTGs, 4 weeks: Improve FAAM to 75%.  Improve ankle DF to 10 deg.  Show improvement in single leg standing with eyes closed.  Independent with HEP.    Plan  Therapy options: will be seen for skilled physical therapy services  Other planned modality interventions: Infrared therapy, modalities prn  Planned therapy interventions: balance/weight-bearing training, flexibility, functional ROM exercises, gait training, home exercise program, manual therapy, neuromuscular re-education, strengthening, stretching and therapeutic activities  Frequency: 1-2 times per week.  Duration in visits: 10  Treatment plan discussed with: patient        Timed:         Manual Therapy:         mins  46643;     Therapeutic Exercise:    15     mins  09187;     Neuromuscular Arjun:        mins  57388;    Therapeutic Activity:          mins  48358;     Gait Training:           mins  17910;     Ultrasound:          mins  26707;    Ionto                                   mins   22277  Self Care                            mins   93115    Un-Timed:  Electrical Stimulation:         mins  42101 ( );  Traction          mins 99384  Low Eval          Mins  70913  Mod Eval     30     Mins  90974  High Eval                            Mins  40820  Re-Eval                               mins  98375  Infrared 15 min      Timed [id number]   mins   Total Treatment:     60  mins    PT SIGNATURE: Malvin Zheng PT, DPT, OCS  [place] license: [id number]  DATE TREATMENT INITIATED: 1/2/2020    Initial Certification  Certification Period: 4/1/2020  I certify that the therapy services are furnished while this patient is under my care.  The services outlined above are required by this patient, and will be reviewed every 90 days.     PHYSICIAN: _____________________________    Tata Lucas,  MD      DATE:     Please sign and return via fax to [phone].. Thank you, [facility].

## 2020-01-06 DIAGNOSIS — G62.9 NEUROPATHY: Primary | ICD-10-CM

## 2020-01-10 ENCOUNTER — TREATMENT (OUTPATIENT)
Dept: PHYSICAL THERAPY | Facility: CLINIC | Age: 45
End: 2020-01-10

## 2020-01-10 DIAGNOSIS — G62.9 PERIPHERAL POLYNEUROPATHY: Primary | ICD-10-CM

## 2020-01-10 PROCEDURE — 97110 THERAPEUTIC EXERCISES: CPT | Performed by: PHYSICAL THERAPIST

## 2020-01-10 PROCEDURE — 97026 INFRARED THERAPY: CPT | Performed by: PHYSICAL THERAPIST

## 2020-01-10 NOTE — PROGRESS NOTES
Physical Therapy Daily Progress Note    VISIT#: 2    Subjective   Pt reports: No new complaints vs IE date.       Objective     See Exercise, Manual, and Modality Logs for complete treatment.       Assessment/Plan Tolerating treatment relatively well. No change in symptoms.      Progress per Plan of Care            Timed:         Manual Therapy:         mins  26296;     Therapeutic Exercise:    15     mins  06574;     Neuromuscular Arjun:        mins  85332;    Therapeutic Activity:          mins  56877;     Gait Training:           mins  76855;     Ultrasound:          mins  06632;    Ionto                                   mins   00660  Self Care                            mins   76320    Un-Timed:  Electrical Stimulation:         mins  70609 ( );  Traction          mins 21391  Low Eval          Mins  32168  Mod Eval          Mins  42982  High Eval                            Mins  25971  Re-Eval                               mins  76903  Infrared 30 min  Timed Treatment:   15   mins   Total Treatment:     45   mins    Malvin Zheng, PT, DPT, OCS  [place] license: [id number]  Physical Therapist

## 2020-01-14 ENCOUNTER — TREATMENT (OUTPATIENT)
Dept: PHYSICAL THERAPY | Facility: CLINIC | Age: 45
End: 2020-01-14

## 2020-01-14 DIAGNOSIS — G62.9 PERIPHERAL POLYNEUROPATHY: Primary | ICD-10-CM

## 2020-01-14 PROCEDURE — 97026 INFRARED THERAPY: CPT | Performed by: PHYSICAL THERAPIST

## 2020-01-14 PROCEDURE — 97110 THERAPEUTIC EXERCISES: CPT | Performed by: PHYSICAL THERAPIST

## 2020-01-14 NOTE — PROGRESS NOTES
Physical Therapy Daily Progress Note    VISIT#: 3    Subjective   Pt reports: No new complaints. Pt is going to be out of the area for several months for work per his report. HEP going well.      Objective     See Exercise, Manual, and Modality Logs for complete treatment.     Patient Education: HEP    Assessment/Plan Treatment to be put on hold due to pt leaving the area. Pt advised to seek therapy services at his new location.                  Timed:         Manual Therapy:         mins  69009;     Therapeutic Exercise:    8     mins  77811;     Neuromuscular Arjun:        mins  41888;    Therapeutic Activity:          mins  99245;     Gait Training:           mins  43053;     Ultrasound:          mins  04616;    Ionto                                   mins   02312  Self Care                            mins   69414    Un-Timed:  Electrical Stimulation:         mins  43993 ( );  Traction          mins 83676  Low Eval          Mins  99095  Mod Eval          Mins  64635  High Eval                            Mins  87256  Re-Eval                               mins  18302  Infrared 30 min  Timed Treatment:   15   mins   Total Treatment:     45   mins    Malvin Zheng, PT, DPT, OCS  [place] license: [id number]  Physical Therapist

## 2020-01-17 ENCOUNTER — APPOINTMENT (OUTPATIENT)
Dept: NEUROLOGY | Facility: HOSPITAL | Age: 45
End: 2020-01-17

## 2020-01-31 ENCOUNTER — APPOINTMENT (OUTPATIENT)
Dept: NEUROLOGY | Facility: HOSPITAL | Age: 45
End: 2020-01-31

## 2020-02-03 RX ORDER — LISINOPRIL AND HYDROCHLOROTHIAZIDE 25; 20 MG/1; MG/1
TABLET ORAL
Qty: 90 TABLET | Refills: 0 | Status: SHIPPED | OUTPATIENT
Start: 2020-02-03 | End: 2020-04-03

## 2020-02-03 RX ORDER — GABAPENTIN 100 MG/1
CAPSULE ORAL
Qty: 180 CAPSULE | Refills: 0 | Status: SHIPPED | OUTPATIENT
Start: 2020-02-03 | End: 2020-04-13

## 2020-02-03 RX ORDER — ALLOPURINOL 300 MG/1
TABLET ORAL
Qty: 180 TABLET | Refills: 0 | Status: SHIPPED | OUTPATIENT
Start: 2020-02-03 | End: 2020-04-03

## 2020-02-12 RX ORDER — LEVOTHYROXINE SODIUM 0.05 MG/1
TABLET ORAL
Qty: 30 TABLET | Refills: 2 | Status: SHIPPED | OUTPATIENT
Start: 2020-02-12 | End: 2020-05-08

## 2020-04-03 RX ORDER — ALLOPURINOL 300 MG/1
TABLET ORAL
Qty: 180 TABLET | Refills: 0 | Status: SHIPPED | OUTPATIENT
Start: 2020-04-03 | End: 2020-07-06

## 2020-04-03 RX ORDER — LISINOPRIL AND HYDROCHLOROTHIAZIDE 25; 20 MG/1; MG/1
TABLET ORAL
Qty: 90 TABLET | Refills: 0 | Status: SHIPPED | OUTPATIENT
Start: 2020-04-03 | End: 2020-07-06

## 2020-04-13 ENCOUNTER — OFFICE VISIT (OUTPATIENT)
Dept: FAMILY MEDICINE CLINIC | Facility: CLINIC | Age: 45
End: 2020-04-13

## 2020-04-13 VITALS
RESPIRATION RATE: 8 BRPM | DIASTOLIC BLOOD PRESSURE: 90 MMHG | BODY MASS INDEX: 34.07 KG/M2 | WEIGHT: 230 LBS | OXYGEN SATURATION: 98 % | SYSTOLIC BLOOD PRESSURE: 160 MMHG | TEMPERATURE: 98.6 F | HEIGHT: 69 IN | HEART RATE: 86 BPM

## 2020-04-13 DIAGNOSIS — G62.9 PERIPHERAL POLYNEUROPATHY: Primary | ICD-10-CM

## 2020-04-13 DIAGNOSIS — I10 ESSENTIAL HYPERTENSION: ICD-10-CM

## 2020-04-13 PROCEDURE — 99213 OFFICE O/P EST LOW 20 MIN: CPT | Performed by: INTERNAL MEDICINE

## 2020-04-13 RX ORDER — PREGABALIN 50 MG/1
50 CAPSULE ORAL 2 TIMES DAILY
Qty: 60 CAPSULE | Refills: 2 | Status: SHIPPED | OUTPATIENT
Start: 2020-04-13 | End: 2020-07-14 | Stop reason: SINTOL

## 2020-04-13 NOTE — PROGRESS NOTES
"Subjective   Timo Hector is a 44 y.o. male.     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    Review of Systems   Constitutional: Negative for fatigue and fever.   HENT: Negative for congestion, ear pain, rhinorrhea and sore throat.    Eyes: Negative for blurred vision and itching.   Respiratory: Negative for cough and shortness of breath.    Cardiovascular: Negative for chest pain and palpitations.   Gastrointestinal: Negative for abdominal pain, diarrhea and vomiting.   Endocrine: Negative for polydipsia and polyuria.   Genitourinary: Negative for dysuria, frequency, hematuria and urgency.   Musculoskeletal: Negative for joint swelling and myalgias.   Skin: Negative for rash and skin lesions.   Neurological: Positive for numbness. Negative for dizziness and headache.   Psychiatric/Behavioral: Negative for depressed mood. The patient is not nervous/anxious.          Current Outpatient Medications:   •  allopurinol (ZYLOPRIM) 300 MG tablet, TAKE TWO TABLETS BY MOUTH DAILY, Disp: 180 tablet, Rfl: 0  •  esomeprazole (nexIUM 24HR) 20 MG capsule, NEXIUM 24HR 20 MG CPDR, Disp: , Rfl:   •  gabapentin (NEURONTIN) 100 MG capsule, TAKE ONE TO TWO CAPSULES BY MOUTH EVERY NIGHT AT BEDTIME, Disp: 180 capsule, Rfl: 0  •  Garlic 100 MG tablet, Take  by mouth., Disp: , Rfl:   •  DORIS BERRY PO, Take  by mouth., Disp: , Rfl: " "  •  levothyroxine (SYNTHROID, LEVOTHROID) 50 MCG tablet, TAKE ONE TABLET BY MOUTH DAILY, Disp: 30 tablet, Rfl: 2  •  lisinopril-hydrochlorothiazide (PRINZIDE,ZESTORETIC) 20-25 MG per tablet, TAKE ONE TABLET BY MOUTH DAILY, Disp: 90 tablet, Rfl: 0  •  meloxicam (MOBIC) 15 MG tablet, TAKE ONE TABLET BY MOUTH DAILY, Disp: 30 tablet, Rfl: 4  •  Omega-3 Fatty Acids (FISH OIL) 1000 MG capsule capsule, Take  by mouth Daily With Breakfast., Disp: , Rfl:     Objective   /90 (BP Location: Left arm, Patient Position: Sitting, Cuff Size: Large Adult)   Pulse 86   Temp 98.6 °F (37 °C) (Oral)   Resp 8   Ht 175.3 cm (69\")   Wt 104 kg (230 lb)   SpO2 98%   BMI 33.97 kg/m²   Physical Exam   Constitutional: He is oriented to person, place, and time. He appears well-developed and well-nourished. No distress.   HENT:   Head: Normocephalic and atraumatic.   Mouth/Throat: Oropharynx is clear and moist. No oropharyngeal exudate.   Eyes: Conjunctivae and EOM are normal. Right eye exhibits no discharge. Left eye exhibits no discharge. No scleral icterus.   Neck: Normal range of motion. Neck supple. No thyromegaly present.   Cardiovascular: Normal rate, regular rhythm and normal heart sounds. Exam reveals no gallop and no friction rub.   No murmur heard.  Pulmonary/Chest: Effort normal and breath sounds normal. No respiratory distress. He has no wheezes. He has no rales.   Musculoskeletal: Normal range of motion. He exhibits no edema or deformity.   Lymphadenopathy:     He has no cervical adenopathy.   Neurological: He is alert and oriented to person, place, and time. No cranial nerve deficit.   Skin: Skin is warm and dry. No rash noted. He is not diaphoretic. No erythema.   Psychiatric: He has a normal mood and affect. His behavior is normal. Thought content normal.   Vitals reviewed.        Assessment/Plan   Problems Addressed this Visit     None      Visit Diagnoses     Peripheral polyneuropathy    -  Primary    Relevant " Medications    pregabalin (Lyrica) 50 MG capsule    Essential hypertension              Discussed that he is going to have to make some decisions about work bc he really needs neuro eval, and possibly more workup and continuing physical therapy  Will try lyrica instead of gabapentin  Repeat manual bp acceptable       Procedures

## 2020-04-21 ENCOUNTER — TELEPHONE (OUTPATIENT)
Dept: NEUROLOGY | Facility: CLINIC | Age: 45
End: 2020-04-21

## 2020-04-21 NOTE — TELEPHONE ENCOUNTER
----- Message from Keisha Ramírez sent at 4/21/2020  1:37 PM EDT -----  I spoke with pt. He is expecting a call from your office for an appointment. Thank you so much for your help!

## 2020-04-23 ENCOUNTER — TELEPHONE (OUTPATIENT)
Dept: NEUROLOGY | Facility: CLINIC | Age: 45
End: 2020-04-23

## 2020-04-23 NOTE — TELEPHONE ENCOUNTER
As of now we don't have anything in July, but put him on the wait list and we will try our best to get him in sooner.

## 2020-04-23 NOTE — TELEPHONE ENCOUNTER
PT CALLED TO SCHEDULE NEW PT APPT WITH DR.SEIPEL AND IS SCHEDULED FOR 9/17/20. 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 I AM NOT CURRENTLY SHOWING ANY AVAILABILITY FOR July AND TOLD PT I WOULD SEND OVER A MESSAGE TO LET OFFICE KNOW HIS WISHES. HE STATED APPRECIATION AND UNDERSTANDING.    BEST CALL BACK- [phone]

## 2020-04-30 RX ORDER — GABAPENTIN 100 MG/1
CAPSULE ORAL
Qty: 180 CAPSULE | Refills: 0 | Status: SHIPPED | OUTPATIENT
Start: 2020-04-30 | End: 2020-07-14 | Stop reason: SDUPTHER

## 2020-05-08 RX ORDER — LEVOTHYROXINE SODIUM 0.05 MG/1
TABLET ORAL
Qty: 30 TABLET | Refills: 1 | Status: SHIPPED | OUTPATIENT
Start: 2020-05-08 | End: 2020-07-17 | Stop reason: SDUPTHER

## 2020-07-06 RX ORDER — LISINOPRIL AND HYDROCHLOROTHIAZIDE 25; 20 MG/1; MG/1
TABLET ORAL
Qty: 30 TABLET | Refills: 0 | Status: SHIPPED | OUTPATIENT
Start: 2020-07-06 | End: 2020-07-17 | Stop reason: SDUPTHER

## 2020-07-06 RX ORDER — ALLOPURINOL 300 MG/1
TABLET ORAL
Qty: 60 TABLET | Refills: 0 | Status: SHIPPED | OUTPATIENT
Start: 2020-07-06 | End: 2020-07-17 | Stop reason: SDUPTHER

## 2020-07-14 ENCOUNTER — OFFICE VISIT (OUTPATIENT)
Dept: NEUROLOGY | Facility: CLINIC | Age: 45
End: 2020-07-14

## 2020-07-14 VITALS
HEART RATE: 95 BPM | DIASTOLIC BLOOD PRESSURE: 82 MMHG | WEIGHT: 227.2 LBS | HEIGHT: 69 IN | SYSTOLIC BLOOD PRESSURE: 137 MMHG | TEMPERATURE: 98.6 F | BODY MASS INDEX: 33.65 KG/M2

## 2020-07-14 DIAGNOSIS — E53.8 B12 DEFICIENCY: ICD-10-CM

## 2020-07-14 DIAGNOSIS — G62.9 PERIPHERAL POLYNEUROPATHY: Primary | ICD-10-CM

## 2020-07-14 PROBLEM — L02.419 ABSCESS OF LOWER EXTREMITY: Status: ACTIVE | Noted: 2017-08-02

## 2020-07-14 PROBLEM — E66.9 OBESITY: Status: ACTIVE | Noted: 2020-07-14

## 2020-07-14 PROBLEM — S92.902A UNSPECIFIED FRACTURE OF LEFT FOOT, INITIAL ENCOUNTER FOR CLOSED FRACTURE: Status: ACTIVE | Noted: 2018-09-20

## 2020-07-14 PROBLEM — R10.11 RUQ PAIN: Status: ACTIVE | Noted: 2017-06-01

## 2020-07-14 PROBLEM — M19.90 OSTEOARTHRITIS: Status: ACTIVE | Noted: 2020-07-14

## 2020-07-14 PROBLEM — S93.622A SPRAIN OF TARSOMETATARSAL LIGAMENT OF LEFT FOOT: Status: ACTIVE | Noted: 2019-01-11

## 2020-07-14 PROBLEM — M79.10 MYALGIA: Status: ACTIVE | Noted: 2017-10-26

## 2020-07-14 PROBLEM — M25.561 KNEE PAIN, RIGHT: Status: ACTIVE | Noted: 2017-04-15

## 2020-07-14 PROBLEM — M77.42 METATARSALGIA OF LEFT FOOT: Status: ACTIVE | Noted: 2019-02-21

## 2020-07-14 PROBLEM — N19 RENAL FAILURE: Status: ACTIVE | Noted: 2018-06-22

## 2020-07-14 PROBLEM — E03.9 HYPOTHYROIDISM: Status: ACTIVE | Noted: 2017-10-26

## 2020-07-14 PROBLEM — R73.02 IMPAIRED GLUCOSE TOLERANCE: Status: ACTIVE | Noted: 2018-06-22

## 2020-07-14 PROBLEM — S91.219A: Status: ACTIVE | Noted: 2018-06-22

## 2020-07-14 PROCEDURE — 99244 OFF/OP CNSLTJ NEW/EST MOD 40: CPT | Performed by: PSYCHIATRY & NEUROLOGY

## 2020-07-14 RX ORDER — GABAPENTIN 100 MG/1
CAPSULE ORAL
Qty: 180 CAPSULE | Refills: 3 | Status: SHIPPED | OUTPATIENT
Start: 2020-07-14 | End: 2020-07-17

## 2020-07-14 NOTE — PROGRESS NOTES
Subjective: Peripheral polyneuropathy     Patient ID: Timo Hector is a 44 y.o. male.    CHIEF COMPLAINT: Tingling/sharp pain is bilateral, all the time    History of Present Illness,    Numbness of both lower extremities,   was taking Gabapentin 100 mg  PCP changed medication and had a reaction not currently taking anything right now.  Onset for few years   states started after he quit drinking 10 years ago,   no back pain, starts at the feet and goes up his legs states cannot feel his feet to the touch.     EMG, 12/12/2019-Fernando Tellez Jr., MD  Impression: Nerve conduction studies are consistent with a severe diffuse polyneuropathy.    Needle EMG of both lower extremities failed to show evidence of superimposed lumbosacral radiculopathy.       The following portions of the patient's history were reviewed and updated as appropriate: allergies, current medications, past family history, past medical history, past social history, past surgical history and problem list.      Family History   Problem Relation Age of Onset   • Cancer Mother    • Hypertension Father    • Osteoarthritis Father        Past Medical History:   Diagnosis Date   • Abscess of leg, right 08/02/2017   • Acute pharyngitis 03/15/2015   • B12 deficiency 06/22/2018   • Cellulitis 11/14/2014   • Edema    • Elbow pain 03/02/2015   • Foot pain, left 09/20/2018   • Gout 11/11/2014   • Headache 02/21/2014   • Hernia of abdominal cavity 03/02/2015   • Hypertension 11/11/2014   • Hypothyroidism 10/26/2017   • Knee pain, right 04/15/2017   • Laceration without foreign body of unspecified toe without damage to nail, initial encounter 06/22/2018   • Leg pain, left    • Lipoma of skin    • Metatarsalgia, left foot 02/21/2019   • Myalgia 10/26/2017   • Obesity 02/21/2014   • Obstructive sleep apnea 02/21/2014   • Osteoarthritis 02/21/2014   • Paresthesia 01/28/2016   • Peripheral neuropathy 10/26/2017   • Plantar fasciitis of right foot 01/28/2016   •  Pre-diabetes 06/22/2018   • Renal insufficiency 06/22/2018   • RUQ pain 06/01/2017   • Sinusitis, acute 12/04/2015   • Skin lesion 03/02/2015   • Snoring    • Sprain of tarsometatarsal ligament of left foot 01/11/2019    INITIAL ENCOUNTER   • Stomatitis and mucositis 03/15/2015    (ULCERATIVE)   • Unspecified fracture of left foot, initial encounter for closed fracture 09/20/2018   • URI (upper respiratory infection) 01/15/2015   • Vitamin B deficiency, unspecified 02/12/2016       Social History     Socioeconomic History   • Marital status:      Spouse name: Not on file   • Number of children: Not on file   • Years of education: Not on file   • Highest education level: Not on file   Tobacco Use   • Smoking status: Never Smoker   • Smokeless tobacco: Current User   Substance and Sexual Activity   • Alcohol use: Not Currently   • Drug use: Never   • Sexual activity: Defer         Current Outpatient Medications:   •  allopurinol (ZYLOPRIM) 300 MG tablet, TAKE TWO TABLETS BY MOUTH DAILY, Disp: 60 tablet, Rfl: 0  •  esomeprazole (nexIUM 24HR) 20 MG capsule, NEXIUM 24HR 20 MG CPDR, Disp: , Rfl:   •  gabapentin (NEURONTIN) 100 MG capsule, Two or three at hs, Disp: 180 capsule, Rfl: 3  •  Garlic 100 MG tablet, Take  by mouth., Disp: , Rfl:   •  DORIS BERRY PO, Take  by mouth., Disp: , Rfl:   •  levothyroxine (SYNTHROID, LEVOTHROID) 50 MCG tablet, TAKE ONE TABLET BY MOUTH DAILY, Disp: 30 tablet, Rfl: 1  •  lisinopril-hydrochlorothiazide (PRINZIDE,ZESTORETIC) 20-25 MG per tablet, TAKE ONE TABLET BY MOUTH DAILY, Disp: 30 tablet, Rfl: 0  •  Omega-3 Fatty Acids (FISH OIL) 1000 MG capsule capsule, Take  by mouth Daily With Breakfast., Disp: , Rfl:     Review of Systems   Constitutional: Negative for appetite change and fatigue.   HENT: Negative for sinus pressure and sinus pain.    Eyes: Negative for pain and itching.   Respiratory: Negative for cough and shortness of breath.    Cardiovascular: Negative for chest  pain.   Gastrointestinal: Negative for constipation and diarrhea.   Endocrine: Negative for cold intolerance and heat intolerance.   Genitourinary: Negative for difficulty urinating and frequency.   Musculoskeletal: Negative for back pain and neck pain.   Allergic/Immunologic: Negative for environmental allergies.   Neurological: Positive for numbness. Negative for tremors, seizures, syncope, facial asymmetry, speech difficulty, weakness, light-headedness and headaches.   Psychiatric/Behavioral: Negative for agitation and confusion.        I have reviewed ROS completed by medical assistant.     Objective:    Neurologic Exam     Mental Status   Oriented to person, place, and time.   Attention: normal.   Level of consciousness: alert    Cranial Nerves     CN III, IV, VI   Pupils are equal, round, and reactive to light.  Extraocular motions are normal.     CN V   Facial sensation intact.     CN VII   Facial expression full, symmetric.     CN VIII   CN VIII normal.     Motor Exam   Overall muscle tone: normal  Right arm tone: normal    Strength   Strength 5/5 throughout.     Sensory Exam   Right arm light touch: normal  Left arm light touch: normal  Right leg light touch: decreased from knee  Left leg light touch: decreased from knee  Right leg pinprick: decreased from knee  Left leg pinprick: decreased from knee    Gait, Coordination, and Reflexes     Gait  Gait: wide-based    Coordination   Romberg: positive    Tremor   Resting tremor: absent  Intention tremor: absent    Reflexes   Right biceps: 1+  Left biceps: 1+  Right patellar: 0  Left patellar: 0  Right achilles: 0  Left achilles: 0      Physical Exam   Constitutional: He is oriented to person, place, and time. He appears well-developed and well-nourished.   HENT:   Nose: Nose normal.   Mouth/Throat: Oropharynx is clear and moist.   Eyes: Pupils are equal, round, and reactive to light. Conjunctivae and EOM are normal.   Cardiovascular: Normal rate, regular rhythm  and normal heart sounds.   Pulmonary/Chest: Effort normal and breath sounds normal. No respiratory distress.   Musculoskeletal: Normal range of motion. He exhibits no edema or deformity.   Neurological: He is alert and oriented to person, place, and time. He has normal strength. No cranial nerve deficit. He has an abnormal Romberg Test. Coordination normal.   Reflex Scores:       Bicep reflexes are 1+ on the right side and 1+ on the left side.       Patellar reflexes are 0 on the right side and 0 on the left side.       Achilles reflexes are 0 on the right side and 0 on the left side.  Psychiatric: He has a normal mood and affect. His behavior is normal.   Vitals reviewed.      Assessment/Plan:    Timo was seen today for peripheral neuropathy.    Diagnoses and all orders for this visit:    Peripheral polyneuropathy  -     Vitamin B6; Future  -     Vitamin E; Future  -     Heavy Metals Screen, Urine - Urine, Clean Catch; Future  -     Protein Elec + Interp, Serum; Future    B12 deficiency    Other orders  -     gabapentin (NEURONTIN) 100 MG capsule; Two or three at hs      Will obtain additional labs    Gabapentin for the discomfort    Pt encouraged to lose weight to help reduce joint damage.     This document has been electronically signed by Joseph Seipel, MD on July 14, 2020 15:27

## 2020-07-17 ENCOUNTER — TELEPHONE (OUTPATIENT)
Dept: FAMILY MEDICINE CLINIC | Facility: CLINIC | Age: 45
End: 2020-07-17

## 2020-07-17 ENCOUNTER — OFFICE VISIT (OUTPATIENT)
Dept: FAMILY MEDICINE CLINIC | Facility: CLINIC | Age: 45
End: 2020-07-17

## 2020-07-17 ENCOUNTER — LAB (OUTPATIENT)
Dept: FAMILY MEDICINE CLINIC | Facility: CLINIC | Age: 45
End: 2020-07-17

## 2020-07-17 VITALS
DIASTOLIC BLOOD PRESSURE: 84 MMHG | SYSTOLIC BLOOD PRESSURE: 116 MMHG | RESPIRATION RATE: 20 BRPM | TEMPERATURE: 99.5 F | BODY MASS INDEX: 33.33 KG/M2 | HEIGHT: 69 IN | WEIGHT: 225 LBS | HEART RATE: 92 BPM

## 2020-07-17 DIAGNOSIS — E03.9 HYPOTHYROIDISM, UNSPECIFIED TYPE: ICD-10-CM

## 2020-07-17 DIAGNOSIS — M79.10 MYALGIA: ICD-10-CM

## 2020-07-17 DIAGNOSIS — I10 ESSENTIAL HYPERTENSION: ICD-10-CM

## 2020-07-17 DIAGNOSIS — R73.03 PREDIABETES: ICD-10-CM

## 2020-07-17 DIAGNOSIS — E53.8 VITAMIN B12 DEFICIENCY: ICD-10-CM

## 2020-07-17 DIAGNOSIS — G62.9 PERIPHERAL POLYNEUROPATHY: Primary | ICD-10-CM

## 2020-07-17 DIAGNOSIS — M1A.9XX0 CHRONIC GOUT WITHOUT TOPHUS, UNSPECIFIED CAUSE, UNSPECIFIED SITE: ICD-10-CM

## 2020-07-17 DIAGNOSIS — G62.9 PERIPHERAL POLYNEUROPATHY: ICD-10-CM

## 2020-07-17 LAB
ALBUMIN SERPL-MCNC: 4.7 G/DL (ref 3.5–5.2)
ALBUMIN/GLOB SERPL: 1.6 G/DL
ALP SERPL-CCNC: 53 U/L (ref 39–117)
ALT SERPL W P-5'-P-CCNC: 41 U/L (ref 1–41)
ANION GAP SERPL CALCULATED.3IONS-SCNC: 13.6 MMOL/L (ref 5–15)
AST SERPL-CCNC: 28 U/L (ref 1–40)
BASOPHILS # BLD AUTO: 0.04 10*3/MM3 (ref 0–0.2)
BASOPHILS NFR BLD AUTO: 0.5 % (ref 0–1.5)
BILIRUB SERPL-MCNC: 0.4 MG/DL (ref 0–1.2)
BUN SERPL-MCNC: 13 MG/DL (ref 6–20)
BUN/CREAT SERPL: 16.5 (ref 7–25)
CALCIUM SPEC-SCNC: 10.1 MG/DL (ref 8.6–10.5)
CHLORIDE SERPL-SCNC: 98 MMOL/L (ref 98–107)
CHOLEST SERPL-MCNC: 190 MG/DL (ref 0–200)
CO2 SERPL-SCNC: 27.4 MMOL/L (ref 22–29)
CREAT SERPL-MCNC: 0.79 MG/DL (ref 0.76–1.27)
CRP SERPL-MCNC: 0.78 MG/DL (ref 0–0.5)
DEPRECATED RDW RBC AUTO: 41 FL (ref 37–54)
EOSINOPHIL # BLD AUTO: 0.28 10*3/MM3 (ref 0–0.4)
EOSINOPHIL NFR BLD AUTO: 3.5 % (ref 0.3–6.2)
ERYTHROCYTE [DISTWIDTH] IN BLOOD BY AUTOMATED COUNT: 12.7 % (ref 12.3–15.4)
ERYTHROCYTE [SEDIMENTATION RATE] IN BLOOD: 12 MM/HR (ref 0–15)
GFR SERPL CREATININE-BSD FRML MDRD: 107 ML/MIN/1.73
GLOBULIN UR ELPH-MCNC: 3 GM/DL
GLUCOSE SERPL-MCNC: 122 MG/DL (ref 65–99)
HBA1C MFR BLD: 5.4 % (ref 3.5–5.6)
HCT VFR BLD AUTO: 45.2 % (ref 37.5–51)
HDLC SERPL-MCNC: 31 MG/DL (ref 40–60)
HGB BLD-MCNC: 15.9 G/DL (ref 13–17.7)
IMM GRANULOCYTES # BLD AUTO: 0.11 10*3/MM3 (ref 0–0.05)
IMM GRANULOCYTES NFR BLD AUTO: 1.4 % (ref 0–0.5)
LDLC SERPL CALC-MCNC: 106 MG/DL (ref 0–100)
LDLC/HDLC SERPL: 3.41 {RATIO}
LYMPHOCYTES # BLD AUTO: 1.96 10*3/MM3 (ref 0.7–3.1)
LYMPHOCYTES NFR BLD AUTO: 24.6 % (ref 19.6–45.3)
MAGNESIUM SERPL-MCNC: 2 MG/DL (ref 1.6–2.6)
MCH RBC QN AUTO: 31.2 PG (ref 26.6–33)
MCHC RBC AUTO-ENTMCNC: 35.2 G/DL (ref 31.5–35.7)
MCV RBC AUTO: 88.8 FL (ref 79–97)
MONOCYTES # BLD AUTO: 0.56 10*3/MM3 (ref 0.1–0.9)
MONOCYTES NFR BLD AUTO: 7 % (ref 5–12)
NEUTROPHILS NFR BLD AUTO: 5.02 10*3/MM3 (ref 1.7–7)
NEUTROPHILS NFR BLD AUTO: 63 % (ref 42.7–76)
NRBC BLD AUTO-RTO: 0 /100 WBC (ref 0–0.2)
PLATELET # BLD AUTO: 358 10*3/MM3 (ref 140–450)
PMV BLD AUTO: 10 FL (ref 6–12)
POTASSIUM SERPL-SCNC: 4.1 MMOL/L (ref 3.5–5.2)
PROT SERPL-MCNC: 7.7 G/DL (ref 6–8.5)
RBC # BLD AUTO: 5.09 10*6/MM3 (ref 4.14–5.8)
SODIUM SERPL-SCNC: 139 MMOL/L (ref 136–145)
T4 FREE SERPL-MCNC: 1.35 NG/DL (ref 0.93–1.7)
TRIGL SERPL-MCNC: 266 MG/DL (ref 0–150)
TSH SERPL DL<=0.05 MIU/L-ACNC: 3.11 UIU/ML (ref 0.27–4.2)
URATE SERPL-MCNC: 5.8 MG/DL (ref 3.4–7)
VIT B12 BLD-MCNC: 887 PG/ML (ref 211–946)
VLDLC SERPL-MCNC: 53.2 MG/DL (ref 5–40)
WBC # BLD AUTO: 7.97 10*3/MM3 (ref 3.4–10.8)

## 2020-07-17 PROCEDURE — 84550 ASSAY OF BLOOD/URIC ACID: CPT | Performed by: INTERNAL MEDICINE

## 2020-07-17 PROCEDURE — 81050 URINALYSIS VOLUME MEASURE: CPT | Performed by: PSYCHIATRY & NEUROLOGY

## 2020-07-17 PROCEDURE — 36415 COLL VENOUS BLD VENIPUNCTURE: CPT

## 2020-07-17 PROCEDURE — 86140 C-REACTIVE PROTEIN: CPT | Performed by: INTERNAL MEDICINE

## 2020-07-17 PROCEDURE — 84439 ASSAY OF FREE THYROXINE: CPT | Performed by: INTERNAL MEDICINE

## 2020-07-17 PROCEDURE — 80061 LIPID PANEL: CPT | Performed by: INTERNAL MEDICINE

## 2020-07-17 PROCEDURE — 99214 OFFICE O/P EST MOD 30 MIN: CPT | Performed by: INTERNAL MEDICINE

## 2020-07-17 PROCEDURE — 82607 VITAMIN B-12: CPT | Performed by: INTERNAL MEDICINE

## 2020-07-17 PROCEDURE — 83735 ASSAY OF MAGNESIUM: CPT | Performed by: INTERNAL MEDICINE

## 2020-07-17 PROCEDURE — 82570 ASSAY OF URINE CREATININE: CPT | Performed by: PSYCHIATRY & NEUROLOGY

## 2020-07-17 PROCEDURE — 84207 ASSAY OF VITAMIN B-6: CPT | Performed by: PSYCHIATRY & NEUROLOGY

## 2020-07-17 PROCEDURE — 80053 COMPREHEN METABOLIC PANEL: CPT | Performed by: INTERNAL MEDICINE

## 2020-07-17 PROCEDURE — 85652 RBC SED RATE AUTOMATED: CPT | Performed by: INTERNAL MEDICINE

## 2020-07-17 PROCEDURE — 83655 ASSAY OF LEAD: CPT | Performed by: PSYCHIATRY & NEUROLOGY

## 2020-07-17 PROCEDURE — 83036 HEMOGLOBIN GLYCOSYLATED A1C: CPT | Performed by: INTERNAL MEDICINE

## 2020-07-17 PROCEDURE — 84446 ASSAY OF VITAMIN E: CPT | Performed by: PSYCHIATRY & NEUROLOGY

## 2020-07-17 PROCEDURE — 82175 ASSAY OF ARSENIC: CPT | Performed by: PSYCHIATRY & NEUROLOGY

## 2020-07-17 PROCEDURE — 86038 ANTINUCLEAR ANTIBODIES: CPT | Performed by: INTERNAL MEDICINE

## 2020-07-17 PROCEDURE — 85025 COMPLETE CBC W/AUTO DIFF WBC: CPT | Performed by: INTERNAL MEDICINE

## 2020-07-17 PROCEDURE — 83825 ASSAY OF MERCURY: CPT | Performed by: PSYCHIATRY & NEUROLOGY

## 2020-07-17 PROCEDURE — 84443 ASSAY THYROID STIM HORMONE: CPT | Performed by: INTERNAL MEDICINE

## 2020-07-17 PROCEDURE — 84165 PROTEIN E-PHORESIS SERUM: CPT | Performed by: PSYCHIATRY & NEUROLOGY

## 2020-07-17 RX ORDER — ALLOPURINOL 300 MG/1
600 TABLET ORAL DAILY
Qty: 180 TABLET | Refills: 0 | Status: SHIPPED | OUTPATIENT
Start: 2020-07-17 | End: 2020-11-11

## 2020-07-17 RX ORDER — MAGNESIUM OXIDE 400 MG/1
400 TABLET ORAL DAILY
Qty: 90 TABLET | Refills: 0 | Status: SHIPPED | OUTPATIENT
Start: 2020-07-17 | End: 2020-11-11

## 2020-07-17 RX ORDER — GABAPENTIN 100 MG/1
CAPSULE ORAL
Qty: 360 CAPSULE | Refills: 0 | Status: SHIPPED | OUTPATIENT
Start: 2020-07-17 | End: 2020-10-12

## 2020-07-17 RX ORDER — LEVOTHYROXINE SODIUM 0.05 MG/1
50 TABLET ORAL DAILY
Qty: 90 TABLET | Refills: 1 | Status: SHIPPED | OUTPATIENT
Start: 2020-07-17 | End: 2021-02-09

## 2020-07-17 RX ORDER — LISINOPRIL AND HYDROCHLOROTHIAZIDE 25; 20 MG/1; MG/1
1 TABLET ORAL DAILY
Qty: 90 TABLET | Refills: 0 | Status: SHIPPED | OUTPATIENT
Start: 2020-07-17 | End: 2020-11-11

## 2020-07-17 NOTE — PROGRESS NOTES
Subjective   Timo Hector is a 44 y.o. male.     Here for med check gout, neuropathy, hypothyroidism, htn    Lyrica made him feel like he was having a heart attack  Had chest tightness, so went back to taking gabapentin  Had run out by the time he saw dr seipel Dr seipel refilled Rx, but he hasn't picked it up yet  Neuropathy does bother him during the day, but if he wears certain shoes/boots  Then it's not significant  Worse if he's barefoot and at night  Didn't realize dr seipel had ordered labs, so those haven't been drawn yet    He just had a couple of weeks vacation  Spent the time camping  Is going back to work next week, so will be in and out of town    No chest pain, SOA  No abd pain, N/V/D  No fever, cough  No headache, dizziness       The following portions of the patient's history were reviewed and updated as appropriate: allergies, current medications, past family history, past medical history, past social history, past surgical history and problem list.    Review of Systems   Constitutional: Positive for fatigue. Negative for fever.   HENT: Negative for congestion, ear pain, rhinorrhea and sore throat.    Eyes: Negative for blurred vision and itching.   Respiratory: Negative for cough and shortness of breath.    Cardiovascular: Negative for chest pain and palpitations.   Gastrointestinal: Negative for abdominal pain, diarrhea and vomiting.   Endocrine: Negative for polydipsia and polyuria.   Genitourinary: Negative for dysuria, frequency, hematuria and urgency.   Musculoskeletal: Positive for arthralgias and myalgias. Negative for joint swelling.   Skin: Negative for rash and skin lesions.   Neurological: Positive for numbness. Negative for dizziness and headache.   Psychiatric/Behavioral: Positive for stress. Negative for depressed mood. The patient is not nervous/anxious.          Current Outpatient Medications:   •  allopurinol (ZYLOPRIM) 300 MG tablet, TAKE TWO TABLETS BY MOUTH DAILY, Disp:  "60 tablet, Rfl: 0  •  esomeprazole (nexIUM 24HR) 20 MG capsule, NEXIUM 24HR 20 MG CPDR, Disp: , Rfl:   •  gabapentin (NEURONTIN) 100 MG capsule, Two or three at hs, Disp: 180 capsule, Rfl: 3  •  Garlic 100 MG tablet, Take  by mouth., Disp: , Rfl:   •  DORIS BERRY PO, Take  by mouth., Disp: , Rfl:   •  levothyroxine (SYNTHROID, LEVOTHROID) 50 MCG tablet, TAKE ONE TABLET BY MOUTH DAILY, Disp: 30 tablet, Rfl: 1  •  lisinopril-hydrochlorothiazide (PRINZIDE,ZESTORETIC) 20-25 MG per tablet, TAKE ONE TABLET BY MOUTH DAILY, Disp: 30 tablet, Rfl: 0  •  Omega-3 Fatty Acids (FISH OIL) 1000 MG capsule capsule, Take  by mouth Daily With Breakfast., Disp: , Rfl:     Objective   /84 (BP Location: Left arm, Patient Position: Sitting, Cuff Size: Adult)   Pulse 92   Temp 99.5 °F (37.5 °C) (Temporal)   Resp 20   Ht 175.3 cm (69\")   Wt 102 kg (225 lb)   BMI 33.23 kg/m²   Physical Exam   Constitutional: He is oriented to person, place, and time. He appears well-developed and well-nourished. No distress.   HENT:   Head: Normocephalic and atraumatic.   Right Ear: External ear normal.   Left Ear: External ear normal.   Mouth/Throat: Oropharynx is clear and moist. No oropharyngeal exudate.   Eyes: Conjunctivae and EOM are normal. Right eye exhibits no discharge. Left eye exhibits no discharge. No scleral icterus.   Neck: Normal range of motion. Neck supple. No thyromegaly present.   Cardiovascular: Normal rate, regular rhythm and normal heart sounds. Exam reveals no gallop and no friction rub.   No murmur heard.  Pulmonary/Chest: Effort normal and breath sounds normal. No respiratory distress. He has no wheezes. He has no rales.   Musculoskeletal: Normal range of motion. He exhibits no edema or deformity.   Lymphadenopathy:     He has no cervical adenopathy.   Neurological: He is alert and oriented to person, place, and time. No cranial nerve deficit.   Skin: Skin is warm and dry. No rash noted. He is not diaphoretic. No " erythema.   Psychiatric: He has a normal mood and affect. His behavior is normal. Thought content normal.   Vitals reviewed.        Assessment/Plan   Problems Addressed this Visit        Cardiovascular and Mediastinum    Hypertension    Relevant Medications    lisinopril-hydrochlorothiazide (PRINZIDE,ZESTORETIC) 20-25 MG per tablet    Other Relevant Orders    CBC Auto Differential (Completed)    Comprehensive Metabolic Panel (Completed)    Lipid Panel (Completed)       Endocrine    Hypothyroidism    Relevant Medications    levothyroxine (SYNTHROID, LEVOTHROID) 50 MCG tablet    Other Relevant Orders    TSH (Completed)    T4, free (Completed)       Nervous and Auditory    Myalgia    Relevant Orders    Magnesium (Completed)    Peripheral neuropathy - Primary    Relevant Orders    Sedimentation Rate (Completed)    C-reactive Protein (Completed)    TIANNA (Completed)       Other    Gout    Relevant Orders    Uric acid (Completed)      Other Visit Diagnoses     Prediabetes        Relevant Orders    Comprehensive Metabolic Panel (Completed)    Lipid Panel (Completed)    Hemoglobin A1c (Completed)    T4, free (Completed)    Vitamin B12 deficiency        Relevant Orders    Vitamin B12 (Completed)          Check labs  Refill meds  Try starting magnesium for muscle cramps  Increase gabapentin to bid dosing         Procedures

## 2020-07-20 DIAGNOSIS — G62.9 PERIPHERAL POLYNEUROPATHY: Primary | ICD-10-CM

## 2020-07-20 LAB
ALBUMIN SERPL-MCNC: 3.6 G/DL (ref 2.9–4.4)
ALBUMIN/GLOB SERPL: 1.1 {RATIO} (ref 0.7–1.7)
ALPHA1 GLOB FLD ELPH-MCNC: 0.2 G/DL (ref 0–0.4)
ALPHA2 GLOB SERPL ELPH-MCNC: 0.9 G/DL (ref 0.4–1)
ANA SER QL: NEGATIVE
B-GLOBULIN SERPL ELPH-MCNC: 1.5 G/DL (ref 0.7–1.3)
GAMMA GLOB SERPL ELPH-MCNC: 0.7 G/DL (ref 0.4–1.8)
GLOBULIN SER CALC-MCNC: 3.4 G/DL (ref 2.2–3.9)
Lab: ABNORMAL
M-SPIKE: ABNORMAL G/DL
PROT PATTERN SERPL ELPH-IMP: ABNORMAL
PROT SERPL-MCNC: 7 G/DL (ref 6–8.5)

## 2020-07-22 LAB
A-TOCOPHEROL VIT E SERPL-MCNC: 15.9 MG/L (ref 7–25.1)
ARSENIC 24H UR-MCNC: ABNORMAL UG/L (ref 0–50)
CREAT 24H UR-MCNC: 0.2 G/L (ref 0.3–3)
GAMMA TOCOPHEROL SERPL-MCNC: 3 MG/L (ref 0.5–5.5)
INORG ARSENIC UR-MCNC: ABNORMAL UG/L (ref 0–19)
LEAD 24H UR-MCNC: ABNORMAL UG/L (ref 0–49)
MERCURY 24H UR-MCNC: ABNORMAL UG/L (ref 0–19)

## 2020-07-23 LAB — VIT B6 SERPL-MCNC: 13.5 UG/L (ref 5.3–46.7)

## 2020-07-30 NOTE — TELEPHONE ENCOUNTER
It looks like dr seipel already called pt about the increased beta-globulin.  Just wanted to make sure pt/girlfriend know the rest of his labs are fine.

## 2020-10-12 RX ORDER — GABAPENTIN 100 MG/1
CAPSULE ORAL
Qty: 120 CAPSULE | Refills: 3 | Status: SHIPPED | OUTPATIENT
Start: 2020-10-12 | End: 2020-11-19

## 2020-11-11 RX ORDER — LISINOPRIL AND HYDROCHLOROTHIAZIDE 25; 20 MG/1; MG/1
TABLET ORAL
Qty: 90 TABLET | Refills: 1 | Status: SHIPPED | OUTPATIENT
Start: 2020-11-11 | End: 2021-05-12 | Stop reason: SDUPTHER

## 2020-11-11 RX ORDER — ALLOPURINOL 300 MG/1
TABLET ORAL
Qty: 180 TABLET | Refills: 1 | Status: SHIPPED | OUTPATIENT
Start: 2020-11-11 | End: 2021-05-06

## 2020-11-19 ENCOUNTER — LAB (OUTPATIENT)
Dept: FAMILY MEDICINE CLINIC | Facility: CLINIC | Age: 45
End: 2020-11-19

## 2020-11-19 ENCOUNTER — OFFICE VISIT (OUTPATIENT)
Dept: FAMILY MEDICINE CLINIC | Facility: CLINIC | Age: 45
End: 2020-11-19

## 2020-11-19 VITALS
SYSTOLIC BLOOD PRESSURE: 110 MMHG | OXYGEN SATURATION: 97 % | RESPIRATION RATE: 16 BRPM | HEART RATE: 86 BPM | TEMPERATURE: 97.1 F | HEIGHT: 69 IN | DIASTOLIC BLOOD PRESSURE: 80 MMHG | BODY MASS INDEX: 33.18 KG/M2 | WEIGHT: 224 LBS

## 2020-11-19 DIAGNOSIS — R25.2 MUSCLE CRAMPS: ICD-10-CM

## 2020-11-19 DIAGNOSIS — G62.9 PERIPHERAL POLYNEUROPATHY: ICD-10-CM

## 2020-11-19 DIAGNOSIS — R25.2 MUSCLE CRAMPS: Primary | ICD-10-CM

## 2020-11-19 LAB
ANION GAP SERPL CALCULATED.3IONS-SCNC: 13.4 MMOL/L (ref 5–15)
BUN SERPL-MCNC: 10 MG/DL (ref 6–20)
BUN/CREAT SERPL: 13.5 (ref 7–25)
CALCIUM SPEC-SCNC: 9.6 MG/DL (ref 8.6–10.5)
CHLORIDE SERPL-SCNC: 98 MMOL/L (ref 98–107)
CO2 SERPL-SCNC: 24.6 MMOL/L (ref 22–29)
CREAT SERPL-MCNC: 0.74 MG/DL (ref 0.76–1.27)
GFR SERPL CREATININE-BSD FRML MDRD: 115 ML/MIN/1.73
GLUCOSE SERPL-MCNC: 112 MG/DL (ref 65–99)
MAGNESIUM SERPL-MCNC: 2.2 MG/DL (ref 1.6–2.6)
POTASSIUM SERPL-SCNC: 4.8 MMOL/L (ref 3.5–5.2)
SODIUM SERPL-SCNC: 136 MMOL/L (ref 136–145)

## 2020-11-19 PROCEDURE — 83735 ASSAY OF MAGNESIUM: CPT | Performed by: NURSE PRACTITIONER

## 2020-11-19 PROCEDURE — 80048 BASIC METABOLIC PNL TOTAL CA: CPT | Performed by: NURSE PRACTITIONER

## 2020-11-19 PROCEDURE — 99214 OFFICE O/P EST MOD 30 MIN: CPT | Performed by: NURSE PRACTITIONER

## 2020-11-19 RX ORDER — TIZANIDINE 4 MG/1
4 TABLET ORAL NIGHTLY PRN
Qty: 90 TABLET | Refills: 1 | Status: SHIPPED | OUTPATIENT
Start: 2020-11-19 | End: 2021-05-06

## 2020-11-19 RX ORDER — GABAPENTIN 300 MG/1
300 CAPSULE ORAL 2 TIMES DAILY
Qty: 180 CAPSULE | Refills: 1 | Status: SHIPPED | OUTPATIENT
Start: 2020-11-19 | End: 2021-05-06

## 2020-11-19 NOTE — PROGRESS NOTES
"Rooming Tab(CC,VS,Pt Hx,Fall Screen)  Chief Complaint   Patient presents with   • leg cramps       Subjective    Pt comes in today with c/o ongoing cramps throughout body. States he has had cramps for a while in legs, but now seems to be getting them in abd and upper body. States he was sitting in tree stand and when he turns or stretches he gets cramps. Feels miserable. Not drinking enough water. Dr. Lucas had started him on Magnesium, but doesn't seem to be helping.   He is also with c/o sever neuropathy in lower ext. Currently prescribed gabapentin 100mg qam, and 300mg qhs, but he is taking all 4 pills at night. Can't remember to take morning dose. Feet still really bother him. Says \"I just want to cut my legs off\".     I have reviewed and updated his medications, medical history and problem list during today's office visit.     Patient Care Team:  Tata Lucas MD as PCP - General    Problem List Tab  Medications Tab  Synopsis Tab  Chart Review Tab  Care Everywhere Tab  Immunizations Tab  Patient History Tab    Social History     Tobacco Use   • Smoking status: Never Smoker   • Smokeless tobacco: Current User   Substance Use Topics   • Alcohol use: Not Currently       Review of Systems   Musculoskeletal: Positive for myalgias.   Neurological: Positive for numbness.       Objective     Rooming Tab(CC,VS,Pt Hx,Fall Screen)  /80 (BP Location: Right arm)   Pulse 86   Temp 97.1 °F (36.2 °C)   Resp 16   Ht 175.3 cm (69\")   Wt 102 kg (224 lb)   SpO2 97%   BMI 33.08 kg/m²     Body mass index is 33.08 kg/m².    Physical Exam  Constitutional:       Appearance: He is well-developed.   Eyes:      Pupils: Pupils are equal, round, and reactive to light.   Cardiovascular:      Rate and Rhythm: Normal rate and regular rhythm.   Pulmonary:      Effort: Pulmonary effort is normal.      Breath sounds: Normal breath sounds.   Neurological:      Mental Status: He is alert and oriented to person, place, and time. "          Statin Choice Calculator  Data Reviewed:                   Assessment/Plan   Order Review Tab  Health Maintenance Tab  Patient Plan/Order Tab  Diagnoses and all orders for this visit:    1. Muscle cramps (Primary)  -     Magnesium; Future  -     Basic Metabolic Panel; Future  -     tiZANidine (Zanaflex) 4 MG tablet; Take 1 tablet by mouth At Night As Needed for Muscle Spasms.  Dispense: 90 tablet; Refill: 1    2. Peripheral polyneuropathy  -     gabapentin (NEURONTIN) 300 MG capsule; Take 1 capsule by mouth 2 (two) times a day.  Dispense: 180 capsule; Refill: 1    check labs  Increase gabapentin to 300mg bid. He must take responsibility in taking meds as prescribed  Cont mag ox  zanaflex prn at night  If no improvement, will refer to neuro  During this office visit, we discussed the pertinent aspects of the visit and treatment recommendations. Pt verbalizes understanding. Follow up was discussed. Patient was given the opportunity to ask questions and discuss other concerns.     Wrapup Tab  Return in about 4 weeks (around 12/17/2020).

## 2021-01-04 ENCOUNTER — TELEPHONE (OUTPATIENT)
Dept: FAMILY MEDICINE CLINIC | Facility: CLINIC | Age: 46
End: 2021-01-04

## 2021-01-04 PROCEDURE — U0003 INFECTIOUS AGENT DETECTION BY NUCLEIC ACID (DNA OR RNA); SEVERE ACUTE RESPIRATORY SYNDROME CORONAVIRUS 2 (SARS-COV-2) (CORONAVIRUS DISEASE [COVID-19]), AMPLIFIED PROBE TECHNIQUE, MAKING USE OF HIGH THROUGHPUT TECHNOLOGIES AS DESCRIBED BY CMS-2020-01-R: HCPCS | Performed by: NURSE PRACTITIONER

## 2021-01-04 NOTE — TELEPHONE ENCOUNTER
Patient had a DOT Physical and informed them that he takes Gabapentin.    Patient is a prev. Dr. Lucas patient and last seen [facility] and would like for her to cont his care.     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     This statement is pending patients Clearance of DOT Phys    Thank you.

## 2021-01-04 NOTE — TELEPHONE ENCOUNTER
Please prepare note for pt stating that he is taking gabapentin for peripheral neuropathy and has not experienced any negative side effects.

## 2021-01-14 ENCOUNTER — OFFICE VISIT (OUTPATIENT)
Dept: NEUROLOGY | Facility: CLINIC | Age: 46
End: 2021-01-14

## 2021-01-14 VITALS
BODY MASS INDEX: 35.37 KG/M2 | HEART RATE: 81 BPM | WEIGHT: 233.4 LBS | SYSTOLIC BLOOD PRESSURE: 138 MMHG | DIASTOLIC BLOOD PRESSURE: 84 MMHG | HEIGHT: 68 IN | TEMPERATURE: 97.5 F

## 2021-01-14 DIAGNOSIS — G62.9 PERIPHERAL POLYNEUROPATHY: ICD-10-CM

## 2021-01-14 DIAGNOSIS — G47.33 OBSTRUCTIVE SLEEP APNEA: Primary | ICD-10-CM

## 2021-01-14 PROCEDURE — 99214 OFFICE O/P EST MOD 30 MIN: CPT | Performed by: PSYCHIATRY & NEUROLOGY

## 2021-01-14 RX ORDER — MULTIPLE VITAMINS W/ MINERALS TAB 9MG-400MCG
1 TAB ORAL DAILY
COMMUNITY
End: 2022-07-04

## 2021-01-14 RX ORDER — DULOXETIN HYDROCHLORIDE 30 MG/1
30 CAPSULE, DELAYED RELEASE ORAL DAILY
Qty: 30 CAPSULE | Refills: 2 | Status: SHIPPED | OUTPATIENT
Start: 2021-01-14 | End: 2021-04-12

## 2021-01-14 RX ORDER — LANOLIN ALCOHOL/MO/W.PET/CERES
1000 CREAM (GRAM) TOPICAL DAILY
COMMUNITY
End: 2021-05-06

## 2021-01-14 NOTE — PROGRESS NOTES
Subjective: Peripheral polyneuropathy, B-12 deficiency    Patient ID: Timo Hector is a 45 y.o. male.    History of Present Illness, 6 month f/u    Peripheral polyneuropathy, f/u with lab results treated with Gabapentin for the discomfort. Patient still having extreme neuropathy in lower legs with pins and needles stabbing his feet.     EMG, 12/12/2019-Fernando Tellez Jr., MD. Consistent with a severe diffuse polyneuropathy.     B12 deficiency, all lab normal    CARON, patient uses a CPAP machine, uses a nasal mask and goes through [email or web address] for supplies. Last seen for sleep was in 2010  Scr avg use 7.5hr, 96%>4 hours avg 8.5 ahi 1.1    Obesity, BMI-35.4    The following portions of the patient's history were reviewed and updated as appropriate: allergies, current medications, past family history, past medical history, past social history, past surgical history and problem list.    Family History   Problem Relation Age of Onset   • Cancer Mother    • Hypertension Father    • Osteoarthritis Father        Past Medical History:   Diagnosis Date   • Abscess of leg, right 08/02/2017   • Acute pharyngitis 03/15/2015   • B12 deficiency 06/22/2018   • Cellulitis 11/14/2014   • Edema    • Elbow pain 03/02/2015   • Foot pain, left 09/20/2018   • Gout 11/11/2014   • Headache 02/21/2014   • Hernia of abdominal cavity 03/02/2015   • Hypertension 11/11/2014   • Hypothyroidism 10/26/2017   • Knee pain, right 04/15/2017   • Laceration without foreign body of unspecified toe without damage to nail, initial encounter 06/22/2018   • Leg pain, left    • Lipoma of skin    • Metatarsalgia, left foot 02/21/2019   • Myalgia 10/26/2017   • Obesity 02/21/2014   • Obstructive sleep apnea 02/21/2014   • Osteoarthritis 02/21/2014   • Paresthesia 01/28/2016   • Peripheral neuropathy 10/26/2017   • Plantar fasciitis of right foot 01/28/2016   • Pre-diabetes 06/22/2018   • Renal insufficiency 06/22/2018   • RUQ pain 06/01/2017   • Sinusitis,  acute 12/04/2015   • Skin lesion 03/02/2015   • Snoring    • Sprain of tarsometatarsal ligament of left foot 01/11/2019    INITIAL ENCOUNTER   • Stomatitis and mucositis 03/15/2015    (ULCERATIVE)   • Unspecified fracture of left foot, initial encounter for closed fracture 09/20/2018   • URI (upper respiratory infection) 01/15/2015   • Vitamin B deficiency, unspecified 02/12/2016       Social History     Socioeconomic History   • Marital status:      Spouse name: Not on file   • Number of children: Not on file   • Years of education: Not on file   • Highest education level: Not on file   Tobacco Use   • Smoking status: Never Smoker   • Smokeless tobacco: Current User   Substance and Sexual Activity   • Alcohol use: Not Currently   • Drug use: Never   • Sexual activity: Defer         Current Outpatient Medications:   •  allopurinol (ZYLOPRIM) 300 MG tablet, TAKE TWO TABLETS BY MOUTH DAILY, Disp: 180 tablet, Rfl: 1  •  esomeprazole (nexIUM 24HR) 20 MG capsule, Take 1 capsule by mouth Every Morning Before Breakfast., Disp: 90 capsule, Rfl: 0  •  gabapentin (NEURONTIN) 300 MG capsule, Take 1 capsule by mouth 2 (two) times a day., Disp: 180 capsule, Rfl: 1  •  levothyroxine (SYNTHROID, LEVOTHROID) 50 MCG tablet, Take 1 tablet by mouth Daily., Disp: 90 tablet, Rfl: 1  •  lisinopril-hydrochlorothiazide (PRINZIDE,ZESTORETIC) 20-25 MG per tablet, TAKE ONE TABLET BY MOUTH DAILY, Disp: 90 tablet, Rfl: 1  •  magnesium oxide (MAGOX) 400 (241.3 Mg) MG tablet tablet, TAKE ONE TABLET BY MOUTH DAILY, Disp: 90 tablet, Rfl: 1  •  multivitamin with minerals tablet tablet, Take 1 tablet by mouth Daily., Disp: , Rfl:   •  tiZANidine (Zanaflex) 4 MG tablet, Take 1 tablet by mouth At Night As Needed for Muscle Spasms., Disp: 90 tablet, Rfl: 1  •  vitamin B-12 (CYANOCOBALAMIN) 1000 MCG tablet, Take 1,000 mcg by mouth Daily., Disp: , Rfl:   •  DULoxetine (Cymbalta) 30 MG capsule, Take 1 capsule by mouth Daily., Disp: 30 capsule, Rfl:  2    Review of Systems   Constitutional: Positive for fatigue. Negative for appetite change.   HENT: Negative for sinus pressure and sinus pain.    Eyes: Negative for pain and itching.   Respiratory: Positive for apnea. Negative for cough and shortness of breath.    Cardiovascular: Negative for chest pain.   Gastrointestinal: Negative for constipation and diarrhea.   Endocrine: Negative for cold intolerance and heat intolerance.   Genitourinary: Negative for difficulty urinating and frequency.   Musculoskeletal: Negative for back pain and neck pain.   Allergic/Immunologic: Negative for environmental allergies.   Neurological: Positive for numbness. Negative for tremors, seizures, syncope, facial asymmetry, speech difficulty, weakness, light-headedness and headaches.   Psychiatric/Behavioral: Negative for agitation and confusion.      EPWORTH SLEEPINESS SCALE  Sitting and reading  3  WatchingTV  3  Sitting, inactive, in a public place  0  As a passenger in a car for 1 hour w/o a break  3  Lying down to rest in the afternoon  0  Sitting and talking to someone  0  Sitting quietly after a lunch  0  In a car, while stopped for traffic or a light  0  Total 9          I have reviewed ROS completed by medical assistant.     Objective:    Neurologic Exam     Cranial Nerves     CN III, IV, VI   Pupils are equal, round, and reactive to light.      Physical Exam  Vitals signs reviewed.   HENT:      Head: Normocephalic.      Nose: Nose normal.   Eyes:      Pupils: Pupils are equal, round, and reactive to light.   Neurological:      Mental Status: He is alert.   Psychiatric:         Mood and Affect: Mood normal.         Behavior: Behavior normal.         Assessment/Plan:    Diagnoses and all orders for this visit:    1. Obstructive sleep apnea (Primary)    2. Peripheral polyneuropathy    Other orders  -     DULoxetine (Cymbalta) 30 MG capsule; Take 1 capsule by mouth Daily.  Dispense: 30 capsule; Refill: 2      For the pain of  the neuropathy did not tolerate lyrica, and not tolerating low dose gabapentin  Will rx Cymbalta  Lab studies were unrewarding.     Dong well with cpap continue current pressure      This document has been electronically signed by Joseph Seipel, MD on January 14, 2021 16:07 EST

## 2021-01-20 ENCOUNTER — OFFICE VISIT (OUTPATIENT)
Dept: FAMILY MEDICINE CLINIC | Facility: CLINIC | Age: 46
End: 2021-01-20

## 2021-01-20 VITALS
TEMPERATURE: 97.1 F | OXYGEN SATURATION: 98 % | HEIGHT: 68 IN | WEIGHT: 253 LBS | SYSTOLIC BLOOD PRESSURE: 136 MMHG | RESPIRATION RATE: 16 BRPM | DIASTOLIC BLOOD PRESSURE: 90 MMHG | HEART RATE: 73 BPM | BODY MASS INDEX: 38.34 KG/M2

## 2021-01-20 DIAGNOSIS — R51.9 NEW ONSET HEADACHE: Primary | ICD-10-CM

## 2021-01-20 PROBLEM — L02.419 ABSCESS OF LOWER EXTREMITY: Status: RESOLVED | Noted: 2017-08-02 | Resolved: 2021-01-20

## 2021-01-20 PROBLEM — R10.11 RUQ PAIN: Status: RESOLVED | Noted: 2017-06-01 | Resolved: 2021-01-20

## 2021-01-20 PROBLEM — M77.42 METATARSALGIA OF LEFT FOOT: Status: RESOLVED | Noted: 2019-02-21 | Resolved: 2021-01-20

## 2021-01-20 PROBLEM — S92.902A UNSPECIFIED FRACTURE OF LEFT FOOT, INITIAL ENCOUNTER FOR CLOSED FRACTURE: Status: RESOLVED | Noted: 2018-09-20 | Resolved: 2021-01-20

## 2021-01-20 PROBLEM — M25.561 KNEE PAIN, RIGHT: Status: RESOLVED | Noted: 2017-04-15 | Resolved: 2021-01-20

## 2021-01-20 PROBLEM — S93.622A SPRAIN OF TARSOMETATARSAL LIGAMENT OF LEFT FOOT: Status: RESOLVED | Noted: 2019-01-11 | Resolved: 2021-01-20

## 2021-01-20 PROCEDURE — 99213 OFFICE O/P EST LOW 20 MIN: CPT | Performed by: NURSE PRACTITIONER

## 2021-01-20 NOTE — ASSESSMENT & PLAN NOTE
This appears to be new, however has now resolved. Not sure what the cause may have been. Possibly BP related, allergies, or other cause.  Repeat /86. Will cont to monitor.   Advised pt to keep a HA diary.

## 2021-01-20 NOTE — PROGRESS NOTES
"Subjective   Timo Hector is a 45 y.o. male.     Chief Complaint   Patient presents with   • Headache   • Dizziness       /90 (BP Location: Left arm)   Pulse 73   Temp 97.1 °F (36.2 °C) (Temporal)   Resp 16   Ht 172.7 cm (68\")   Wt 115 kg (253 lb)   SpO2 98%   BMI 38.47 kg/m²     BP Readings from Last 3 Encounters:   01/20/21 136/90   01/14/21 138/84   01/04/21 149/90       Wt Readings from Last 3 Encounters:   01/20/21 115 kg (253 lb)   01/14/21 106 kg (233 lb 6.4 oz)   01/04/21 104 kg (230 lb)       Pt comes in today with c/o headaches that have been going on for about 1 month. Was waking up with headaches. tyelnol would help. Trouble opening eyes. Pt is a very poor historian.   Can't tell me where headaches are located.   Can't tell me what triggers them.  States they started in Dec, and this past weekend \"felt terrible\".   Recently saw Dr. Seipel and was prescribed cymbalta for severe polyneuropathy. Feels the combo of cymbalta and gabapentin doesn't work well for him. Hasn't taken gabapentin last 2 days. Feeling better now. Hasn't had a HA in a couple of days.     Headache   Associated symptoms include dizziness, numbness and weakness.   Dizziness  Associated symptoms include headaches, numbness and weakness. Pertinent negatives include no chest pain.        The following portions of the patient's history were reviewed and updated as appropriate: allergies, current medications, past family history, past medical history, past social history, past surgical history and problem list.    Review of Systems   Cardiovascular: Negative for chest pain, palpitations and leg swelling.   Neurological: Positive for dizziness, weakness, numbness and headache.     Objective   Physical Exam  Constitutional:       Appearance: He is well-developed.   Eyes:      Pupils: Pupils are equal, round, and reactive to light.   Cardiovascular:      Rate and Rhythm: Normal rate and regular rhythm.   Pulmonary:      " Effort: Pulmonary effort is normal.      Breath sounds: Normal breath sounds.   Neurological:      Mental Status: He is alert and oriented to person, place, and time.      Cranial Nerves: Cranial nerves are intact.      Sensory: Sensation is intact.      Motor: Motor function is intact.      Coordination: Coordination is intact.   Psychiatric:         Mood and Affect: Mood normal.         Speech: Speech normal.         Behavior: Behavior normal.         Diagnoses and all orders for this visit:    1. New onset headache (Primary)  Assessment & Plan:  This appears to be new, however has now resolved. Not sure what the cause may have been. Possibly BP related, allergies, or other cause.  Repeat /86. Will cont to monitor.   Advised pt to keep a HA diary.       During this office visit, we discussed the pertinent aspects of the visit and treatment recommendations. Pt verbalizes understanding. Follow up was discussed. Patient was given the opportunity to ask questions and discuss other concerns.     Return if symptoms worsen or fail to improve.

## 2021-02-09 RX ORDER — LEVOTHYROXINE SODIUM 0.05 MG/1
TABLET ORAL
Qty: 30 TABLET | Refills: 0 | Status: SHIPPED | OUTPATIENT
Start: 2021-02-09 | End: 2021-03-08

## 2021-03-08 RX ORDER — LEVOTHYROXINE SODIUM 0.05 MG/1
TABLET ORAL
Qty: 30 TABLET | Refills: 0 | Status: SHIPPED | OUTPATIENT
Start: 2021-03-08 | End: 2021-03-09

## 2021-03-09 RX ORDER — LEVOTHYROXINE SODIUM 0.05 MG/1
TABLET ORAL
Qty: 30 TABLET | Refills: 0 | Status: SHIPPED | OUTPATIENT
Start: 2021-03-09 | End: 2021-05-06

## 2021-04-12 RX ORDER — DULOXETIN HYDROCHLORIDE 30 MG/1
CAPSULE, DELAYED RELEASE ORAL
Qty: 30 CAPSULE | Refills: 1 | Status: SHIPPED | OUTPATIENT
Start: 2021-04-12 | End: 2021-05-06

## 2021-05-06 ENCOUNTER — OFFICE VISIT (OUTPATIENT)
Dept: ORTHOPEDIC SURGERY | Facility: CLINIC | Age: 46
End: 2021-05-06

## 2021-05-06 VITALS
SYSTOLIC BLOOD PRESSURE: 132 MMHG | HEIGHT: 68 IN | HEART RATE: 90 BPM | WEIGHT: 228 LBS | DIASTOLIC BLOOD PRESSURE: 85 MMHG | BODY MASS INDEX: 34.56 KG/M2

## 2021-05-06 DIAGNOSIS — M25.562 ACUTE PAIN OF LEFT KNEE: Primary | ICD-10-CM

## 2021-05-06 PROCEDURE — 99203 OFFICE O/P NEW LOW 30 MIN: CPT | Performed by: ORTHOPAEDIC SURGERY

## 2021-05-06 NOTE — PATIENT INSTRUCTIONS
MRI follow-up instructions    Today at your office visit, Dr. Love recommended an MRI (magnetic resonance imaging) to evaluate your joint pain.  This requires a precertification process, which our office will do, and then we will contact you when it is approved and go over scheduling options.  We typically recommend these to be performed at [facility] or [facility].  If for some reason it is performed elsewhere please arrange to have that facility give you a disc with your images on it so Dr. Love can review it at your follow-up visit.    When checking out today we recommend making an appointment to go over your results in approximately two weeks.  If your MRI is done sooner than that we would be happy to schedule you sooner to go over your results, just contact us at [phone] or through the [employer] portal to let us know your MRI is completed.  Seeing you in person for the results gives us the best opportunity to look at your images together and explain the diagnosis and treatment options to best help you.

## 2021-05-06 NOTE — PROGRESS NOTES
Patient ID: Timo Hector is a 45 y.o. male.    Chief Complaint:    Chief Complaint   Patient presents with   • Left Knee - Initial Evaluation       HPI:  Timo is a 45-year-old gentleman here with longstanding bilateral left greater than right knee pain.  There is no injury.  He has clicking and popping with catching especially in the left knee over the lateral joint line.  He has a treatment about 4 years ago with viscosupplementation and steroid injection without really any significant relief.  Pain is sharp and a 6/10 despite recent oral medication  Past Medical History:   Diagnosis Date   • Abscess of leg, right 08/02/2017   • Acute pharyngitis 03/15/2015   • B12 deficiency 06/22/2018   • Cellulitis 11/14/2014   • Edema    • Elbow pain 03/02/2015   • Foot pain, left 09/20/2018   • Gout 11/11/2014   • Headache 02/21/2014   • Hernia of abdominal cavity 03/02/2015   • Hypertension 11/11/2014   • Hypothyroidism 10/26/2017   • Knee pain, right 04/15/2017   • Laceration without foreign body of unspecified toe without damage to nail, initial encounter 06/22/2018   • Leg pain, left    • Lipoma of skin    • Metatarsalgia, left foot 02/21/2019   • Myalgia 10/26/2017   • Obesity 02/21/2014   • Obstructive sleep apnea 02/21/2014   • Osteoarthritis 02/21/2014   • Paresthesia 01/28/2016   • Peripheral neuropathy 10/26/2017   • Plantar fasciitis of right foot 01/28/2016   • Pre-diabetes 06/22/2018   • Renal insufficiency 06/22/2018   • RUQ pain 06/01/2017   • Sinusitis, acute 12/04/2015   • Skin lesion 03/02/2015   • Snoring    • Sprain of tarsometatarsal ligament of left foot 01/11/2019    INITIAL ENCOUNTER   • Stomatitis and mucositis 03/15/2015    (ULCERATIVE)   • Unspecified fracture of left foot, initial encounter for closed fracture 09/20/2018   • URI (upper respiratory infection) 01/15/2015   • Vitamin B deficiency, unspecified 02/12/2016       Past Surgical History:   Procedure Laterality Date   • HERNIA  "REPAIR     • PILONIDAL CYSTECTOMY         Family History   Problem Relation Age of Onset   • Cancer Mother    • Hypertension Father    • Osteoarthritis Father           Social History     Occupational History   • Not on file   Tobacco Use   • Smoking status: Never Smoker   • Smokeless tobacco: Current User   Substance and Sexual Activity   • Alcohol use: Not Currently   • Drug use: Never   • Sexual activity: Defer      Review of Systems   Cardiovascular: Negative for chest pain.   Musculoskeletal: Positive for arthralgias.       Objective:    /85   Pulse 90   Ht 172.7 cm (68\")   Wt 103 kg (228 lb)   BMI 34.67 kg/m²     Physical Examination:  Left knee demonstrates intact skin.  Trace effusion.  Mild lateral joint line tenderness.  Knee range of motion is 0 to 125 degrees with a positive lateral Ro but no instability, Vinh negative  Sensory and motor exam are intact all distributions. Dorsalis pedis and posterior tibialis pulses are palpable and capillary refill is less than two seconds to all digits    Imaging:  Left knee X-Ray  Indication: Chronic bilateral patellofemoral knee pain  AP, Lateral views, [place]  Findings: Mild patellofemoral narrowing  no bony lesion  Soft tissues normal  decreased joint spaces  Hardware appropriately positioned not applicable      no prior studies available for comparison.    Assessment:  Left knee pain possible lateral meniscus tear    Plan:  I recommend MRI to evaluate for meniscus tear      Procedures         Disclaimer: Please note that areas of this note were completed with computer voice recognition software.  Quite often unanticipated grammatical, syntax, homophones, and other interpretive errors are inadvertently transcribed by the computer software. Please excuse any errors that have escaped final proofreading.  "

## 2021-05-13 RX ORDER — LISINOPRIL AND HYDROCHLOROTHIAZIDE 25; 20 MG/1; MG/1
1 TABLET ORAL DAILY
Qty: 90 TABLET | Refills: 1 | Status: SHIPPED | OUTPATIENT
Start: 2021-05-13 | End: 2021-11-15

## 2021-05-24 ENCOUNTER — APPOINTMENT (OUTPATIENT)
Dept: MRI IMAGING | Facility: HOSPITAL | Age: 46
End: 2021-05-24

## 2021-05-27 ENCOUNTER — HOSPITAL ENCOUNTER (OUTPATIENT)
Dept: MRI IMAGING | Facility: HOSPITAL | Age: 46
Discharge: HOME | End: 2021-05-27
Admitting: ORTHOPAEDIC SURGERY

## 2021-05-27 DIAGNOSIS — M25.562 ACUTE PAIN OF LEFT KNEE: ICD-10-CM

## 2021-05-27 PROCEDURE — 73721 MRI JNT OF LWR EXTRE W/O DYE: CPT

## 2021-06-11 RX ORDER — DULOXETIN HYDROCHLORIDE 30 MG/1
CAPSULE, DELAYED RELEASE ORAL
Qty: 30 CAPSULE | Refills: 0 | OUTPATIENT
Start: 2021-06-11

## 2021-11-12 ENCOUNTER — LAB (OUTPATIENT)
Dept: FAMILY MEDICINE CLINIC | Facility: CLINIC | Age: 46
End: 2021-11-12

## 2021-11-12 ENCOUNTER — OFFICE VISIT (OUTPATIENT)
Dept: FAMILY MEDICINE CLINIC | Facility: CLINIC | Age: 46
End: 2021-11-12

## 2021-11-12 VITALS
HEIGHT: 69 IN | OXYGEN SATURATION: 98 % | BODY MASS INDEX: 32.73 KG/M2 | SYSTOLIC BLOOD PRESSURE: 120 MMHG | WEIGHT: 221 LBS | DIASTOLIC BLOOD PRESSURE: 70 MMHG | RESPIRATION RATE: 18 BRPM | HEART RATE: 84 BPM | TEMPERATURE: 98 F

## 2021-11-12 DIAGNOSIS — E03.9 HYPOTHYROIDISM, UNSPECIFIED TYPE: ICD-10-CM

## 2021-11-12 DIAGNOSIS — Z12.11 SCREENING FOR COLON CANCER: ICD-10-CM

## 2021-11-12 DIAGNOSIS — K92.1 BLOOD IN STOOL: Primary | ICD-10-CM

## 2021-11-12 DIAGNOSIS — L98.9 SKIN LESIONS, GENERALIZED: ICD-10-CM

## 2021-11-12 DIAGNOSIS — Z12.5 PROSTATE CANCER SCREENING: ICD-10-CM

## 2021-11-12 DIAGNOSIS — I10 HYPERTENSION, UNSPECIFIED TYPE: ICD-10-CM

## 2021-11-12 DIAGNOSIS — Z13.220 SCREENING, LIPID: ICD-10-CM

## 2021-11-12 DIAGNOSIS — K92.1 BLOOD IN STOOL: ICD-10-CM

## 2021-11-12 LAB
ALBUMIN SERPL-MCNC: 4.6 G/DL (ref 3.5–5.2)
ALBUMIN/GLOB SERPL: 1.7 G/DL
ALP SERPL-CCNC: 52 U/L (ref 39–117)
ALT SERPL W P-5'-P-CCNC: 24 U/L (ref 1–41)
ANION GAP SERPL CALCULATED.3IONS-SCNC: 13.9 MMOL/L (ref 5–15)
AST SERPL-CCNC: 16 U/L (ref 1–40)
BILIRUB SERPL-MCNC: 0.3 MG/DL (ref 0–1.2)
BUN SERPL-MCNC: 18 MG/DL (ref 6–20)
BUN/CREAT SERPL: 18.9 (ref 7–25)
CALCIUM SPEC-SCNC: 9.9 MG/DL (ref 8.6–10.5)
CHLORIDE SERPL-SCNC: 98 MMOL/L (ref 98–107)
CHOLEST SERPL-MCNC: 185 MG/DL (ref 0–200)
CO2 SERPL-SCNC: 25.1 MMOL/L (ref 22–29)
CREAT SERPL-MCNC: 0.95 MG/DL (ref 0.76–1.27)
DEPRECATED RDW RBC AUTO: 40.1 FL (ref 37–54)
ERYTHROCYTE [DISTWIDTH] IN BLOOD BY AUTOMATED COUNT: 12.3 % (ref 12.3–15.4)
GFR SERPL CREATININE-BSD FRML MDRD: 86 ML/MIN/1.73
GLOBULIN UR ELPH-MCNC: 2.7 GM/DL
GLUCOSE SERPL-MCNC: 103 MG/DL (ref 65–99)
HCT VFR BLD AUTO: 44.5 % (ref 37.5–51)
HDLC SERPL-MCNC: 29 MG/DL (ref 40–60)
HGB BLD-MCNC: 15.4 G/DL (ref 13–17.7)
LDLC SERPL CALC-MCNC: 121 MG/DL (ref 0–100)
LDLC/HDLC SERPL: 4.03 {RATIO}
MCH RBC QN AUTO: 31.2 PG (ref 26.6–33)
MCHC RBC AUTO-ENTMCNC: 34.6 G/DL (ref 31.5–35.7)
MCV RBC AUTO: 90.1 FL (ref 79–97)
PLATELET # BLD AUTO: 385 10*3/MM3 (ref 140–450)
PMV BLD AUTO: 9.8 FL (ref 6–12)
POTASSIUM SERPL-SCNC: 4.3 MMOL/L (ref 3.5–5.2)
PROT SERPL-MCNC: 7.3 G/DL (ref 6–8.5)
PSA SERPL-MCNC: 0.55 NG/ML (ref 0–4)
RBC # BLD AUTO: 4.94 10*6/MM3 (ref 4.14–5.8)
SODIUM SERPL-SCNC: 137 MMOL/L (ref 136–145)
T4 FREE SERPL-MCNC: 1.32 NG/DL (ref 0.93–1.7)
TRIGL SERPL-MCNC: 196 MG/DL (ref 0–150)
TSH SERPL DL<=0.05 MIU/L-ACNC: 4.21 UIU/ML (ref 0.27–4.2)
VLDLC SERPL-MCNC: 35 MG/DL (ref 5–40)
WBC # BLD AUTO: 8.96 10*3/MM3 (ref 3.4–10.8)

## 2021-11-12 PROCEDURE — 36415 COLL VENOUS BLD VENIPUNCTURE: CPT

## 2021-11-12 PROCEDURE — 99214 OFFICE O/P EST MOD 30 MIN: CPT | Performed by: NURSE PRACTITIONER

## 2021-11-12 PROCEDURE — 84439 ASSAY OF FREE THYROXINE: CPT | Performed by: NURSE PRACTITIONER

## 2021-11-12 PROCEDURE — 80061 LIPID PANEL: CPT | Performed by: NURSE PRACTITIONER

## 2021-11-12 PROCEDURE — 80053 COMPREHEN METABOLIC PANEL: CPT | Performed by: NURSE PRACTITIONER

## 2021-11-12 PROCEDURE — 84443 ASSAY THYROID STIM HORMONE: CPT | Performed by: NURSE PRACTITIONER

## 2021-11-12 PROCEDURE — 85027 COMPLETE CBC AUTOMATED: CPT | Performed by: NURSE PRACTITIONER

## 2021-11-12 PROCEDURE — G0103 PSA SCREENING: HCPCS | Performed by: NURSE PRACTITIONER

## 2021-11-12 NOTE — PROGRESS NOTES
"Chief Complaint  Rectal Bleeding    Subjective          Timo Hector presents to [facility]  History of Present Illness  Timo is a 44 yo patient of ROCK Miller NP, previously unknown to me who presents today with c/o blood in his stool the other day  has happened in the past, but it was \"a while back\"    He saw blood on the toilet paper and some on the stool  Denies any rectal pain or hemorrhoids  Had a twinge of left sided abdominal pain  He has h/o HTN, hypothyroidism, obesity, b12 def., renal failure, gout, OA, peripheral neuropathy, reflux  And is on the following medicines: nexium, lisinopril-hctz    Quit taking his thyroid medicine  Only takes blood pressure med and nexium    He works out of town and has difficulty getting into the doctors office, will get follow-up/annual labs today as well, is fasting    He has not had a colonoscopy  There isn't a family h/o colon cancer    Review of Systems   Constitutional: Negative.    HENT: Negative.    Respiratory: Negative.  Negative for cough, shortness of breath and wheezing.    Cardiovascular: Negative.  Negative for chest pain and leg swelling.   Gastrointestinal: Positive for blood in stool. Negative for abdominal pain, anal bleeding, constipation and diarrhea.   Genitourinary: Negative.    Musculoskeletal: Negative.    Neurological: Negative.    Psychiatric/Behavioral: Negative.      Objective   Vital Signs:   /70 (BP Location: Left arm, Patient Position: Sitting, Cuff Size: Adult)   Pulse 84   Temp 98 °F (36.7 °C) (Temporal)   Resp 18   Ht 174 cm (68.5\")   Wt 100 kg (221 lb)   SpO2 98%   BMI 33.11 kg/m²     Physical Exam  Vitals reviewed.   Constitutional:       Appearance: Normal appearance. He is obese.   Neck:      Vascular: No carotid bruit.   Cardiovascular:      Rate and Rhythm: Normal rate and regular rhythm.      Pulses: Normal pulses.      Heart sounds: Normal heart sounds.   Pulmonary:      Effort: " Pulmonary effort is normal.      Breath sounds: Normal breath sounds.   Abdominal:      General: Bowel sounds are normal.      Palpations: Abdomen is soft.      Tenderness: There is no abdominal tenderness. There is no right CVA tenderness, left CVA tenderness or guarding.   Musculoskeletal:         General: Normal range of motion.      Cervical back: Neck supple.      Right lower leg: No edema.      Left lower leg: No edema.   Lymphadenopathy:      Cervical: No cervical adenopathy.   Skin:     General: Skin is warm.      Capillary Refill: Capillary refill takes less than 2 seconds.   Neurological:      Mental Status: He is alert and oriented to person, place, and time.        Result Review :                 Assessment and Plan    Diagnoses and all orders for this visit:    1. Blood in stool (Primary)  -     CBC No Differential; Future  -     Cancel: POCT occult blood x 1 stool  -     Occult Blood X 1, Stool - Stool, Per Rectum; Future    2. Screening for colon cancer  -     Ambulatory Referral For Screening Colonoscopy    3. Prostate cancer screening  -     PSA SCREENING; Future    4. Hypothyroidism, unspecified type  -     TSH; Future  -     T4, free; Future    5. Screening, lipid  -     Lipid panel; Future    6. Hypertension, unspecified type  -     Comprehensive metabolic panel; Future    7. Skin lesions, generalized  -     Ambulatory Referral to Dermatology        Follow Up   Return in about 6 months (around 5/12/2022).  Patient was given instructions and counseling regarding his condition or for health maintenance advice. Please see specific information pulled into the AVS if appropriate.

## 2021-11-15 RX ORDER — LISINOPRIL AND HYDROCHLOROTHIAZIDE 25; 20 MG/1; MG/1
TABLET ORAL
Qty: 90 TABLET | Refills: 1 | Status: SHIPPED | OUTPATIENT
Start: 2021-11-15 | End: 2022-09-27

## 2022-01-13 NOTE — PROGRESS NOTES
Chief Complaint  Sleep Apnea    Subjective          Timo Hector presents to [facility]  History of Present Illness  CARON, patient uses a CPAP machine, uses a nasal mask and goes through [employer] for supplies, sleeping well.     Sleep testing history:  Sleep study Unavailable   PAP download:  The patient is on CPAP therapy at 7-15 cm/H2O.   Data indicates Excellent compliance. With 93% usage for more than 4 hours with an average usage of 7 hours 25 minutes. AHI down to 1.1 .  Average pressures 8.  Average large leak 10sec.   The patient's hypersomnia has improved     [place] Sleepiness Scale:  Sitting and reading 3 WatchingTV 3  Sitting, inactive, in a public place 2  As a passenger in a car for 1 hour w/o a break  3  Lying down to rest in the afternoon  0  Sitting and talking to someone  0  Sitting quietly after a lunch  0  In a car, while stopped for traffic or a light  0  Total 11  ===========================JAN 2021==  CARON   CARON, patient uses a CPAP machine, uses a nasal mask and goes through [employer] for supplies. Last seen for sleep was in 2010  Scr avg use 7.5hr, 96%>4 hours avg 8.5 ahi 1.1   Obesity, BMI-35.4  ========================================      NEUROPATHY    Not having as much pain, just tingling, no worsening.   Not taking Cymbalta or gabapentin;  Now having some tingling in the finger tips.       ===============July 2020--  Numbness of both lower extremities,   was taking Gabapentin 100 mg  PCP changed medication and had a reaction not currently taking anything right now.  Onset for few years   states started after he quit drinking 10 years ago,   no back pain, starts at the feet and goes up his legs states cannot feel his feet to the touch.    EMG, 12/12/2019-Fernando Tellez Jr., MD  Impression: Nerve conduction studies are consistent with a severe diffuse polyneuropathy.    Needle EMG of both lower extremities failed to show evidence of superimposed  "lumbosacral radiculopathy.   --------------------------------------------------  Beta globulin increased on protein electrophoresis of questionable significance  Have ordered additional blood test for follow up      --------------The SPE pattern demonstrates an increase in the beta fraction. This   may be due to ........................ immunoglobulins, as seen in polyclonal   or monoclonal gammopathies. ......  the presence   of a monoclonal gammopathy may be confirmed by immunofixation  ......................  If found could be the cause of neuropathy  ====================  Review of Systems   Constitutional: Negative.    HENT: Negative.    Eyes: Negative.    Respiratory: Positive for apnea. Negative for cough and shortness of breath.    Cardiovascular: Negative.    Gastrointestinal: Negative.    Endocrine: Negative.    Genitourinary: Negative.    Musculoskeletal: Negative.    Neurological: Negative.    Psychiatric/Behavioral: Negative.      Objective   Vital Signs:   /84 (BP Location: Left arm, Patient Position: Sitting, Cuff Size: Adult)   Pulse 80   Temp 97.3 °F (36.3 °C)   Ht 174 cm (68.5\")   Wt 108 kg (238 lb)   BMI 35.66 kg/m²     Physical Exam  Vitals reviewed.   Neurological:      General: No focal deficit present.      Mental Status: He is alert and oriented to person, place, and time.   Psychiatric:         Mood and Affect: Mood normal.         Behavior: Behavior normal.        Result Review :          Assessment and Plan    Diagnoses and all orders for this visit:    1. Obstructive sleep apnea (Primary)    2. Peripheral polyneuropathy      The patient is compliant with and benefiting from PAP therapy.  Pt informed of the recall   If not recalled will order new machine due to the age of the machine.     Will obtain repeat protein electrophoresis. And FBS  Re the neuropathy    Follow Up   Return in about 1 year (around 1/17/2023).    Patient was given instructions and counseling regarding his " condition or for health maintenance advice. Please see specific information pulled into the AVS if appropriate.       This document has been electronically signed by Joseph Seipel, MD on January 17, 2022 14:00 EST

## 2022-01-17 ENCOUNTER — OFFICE VISIT (OUTPATIENT)
Dept: NEUROLOGY | Facility: CLINIC | Age: 47
End: 2022-01-17

## 2022-01-17 VITALS
WEIGHT: 238 LBS | HEIGHT: 69 IN | DIASTOLIC BLOOD PRESSURE: 84 MMHG | HEART RATE: 80 BPM | SYSTOLIC BLOOD PRESSURE: 151 MMHG | TEMPERATURE: 97.3 F | BODY MASS INDEX: 35.25 KG/M2

## 2022-01-17 DIAGNOSIS — G62.9 PERIPHERAL POLYNEUROPATHY: ICD-10-CM

## 2022-01-17 DIAGNOSIS — G47.33 OBSTRUCTIVE SLEEP APNEA: Primary | ICD-10-CM

## 2022-01-17 PROCEDURE — 99214 OFFICE O/P EST MOD 30 MIN: CPT | Performed by: PSYCHIATRY & NEUROLOGY

## 2022-01-17 RX ORDER — FLUOROURACIL 50 MG/G
CREAM TOPICAL
COMMUNITY
Start: 2021-12-16 | End: 2022-07-04

## 2022-01-17 NOTE — ADDENDUM NOTE
Addended by: SEIPEL, JOSEPH F on: 1/17/2022 02:09 PM     Modules accepted: Orders, Level of Service

## 2022-01-22 ENCOUNTER — TELEPHONE (OUTPATIENT)
Dept: NEUROLOGY | Facility: CLINIC | Age: 47
End: 2022-01-22

## 2022-01-22 DIAGNOSIS — G47.33 OBSTRUCTIVE SLEEP APNEA: Primary | ICD-10-CM

## 2022-01-22 NOTE — TELEPHONE ENCOUNTER
----- Message from Joseph F Seipel, MD sent at 1/17/2022  1:55 PM EST -----  nasal mask and goes through [employer]

## 2022-06-27 ENCOUNTER — ON CAMPUS - OUTPATIENT (OUTPATIENT)
Dept: URBAN - METROPOLITAN AREA HOSPITAL 2 | Facility: HOSPITAL | Age: 47
End: 2022-06-27
Payer: COMMERCIAL

## 2022-06-27 VITALS
HEART RATE: 86 BPM | HEART RATE: 93 BPM | HEART RATE: 84 BPM | OXYGEN SATURATION: 99 % | SYSTOLIC BLOOD PRESSURE: 154 MMHG | TEMPERATURE: 98 F | HEART RATE: 89 BPM | OXYGEN SATURATION: 98 % | DIASTOLIC BLOOD PRESSURE: 91 MMHG | RESPIRATION RATE: 19 BRPM | OXYGEN SATURATION: 97 % | RESPIRATION RATE: 18 BRPM | SYSTOLIC BLOOD PRESSURE: 119 MMHG | OXYGEN SATURATION: 93 % | SYSTOLIC BLOOD PRESSURE: 145 MMHG | SYSTOLIC BLOOD PRESSURE: 114 MMHG | RESPIRATION RATE: 16 BRPM | HEART RATE: 79 BPM | HEIGHT: 68 IN | SYSTOLIC BLOOD PRESSURE: 139 MMHG | SYSTOLIC BLOOD PRESSURE: 127 MMHG | DIASTOLIC BLOOD PRESSURE: 96 MMHG | HEART RATE: 75 BPM | DIASTOLIC BLOOD PRESSURE: 79 MMHG | SYSTOLIC BLOOD PRESSURE: 132 MMHG | HEART RATE: 87 BPM | DIASTOLIC BLOOD PRESSURE: 83 MMHG | WEIGHT: 222 LBS | OXYGEN SATURATION: 95 % | HEART RATE: 85 BPM | DIASTOLIC BLOOD PRESSURE: 80 MMHG | DIASTOLIC BLOOD PRESSURE: 87 MMHG | SYSTOLIC BLOOD PRESSURE: 133 MMHG | DIASTOLIC BLOOD PRESSURE: 90 MMHG | DIASTOLIC BLOOD PRESSURE: 75 MMHG

## 2022-06-27 DIAGNOSIS — Z12.11 ENCOUNTER FOR SCREENING FOR MALIGNANT NEOPLASM OF COLON: ICD-10-CM

## 2022-06-27 DIAGNOSIS — K57.30 DIVERTICULOSIS OF LARGE INTESTINE WITHOUT PERFORATION OR ABS: ICD-10-CM

## 2022-06-27 PROCEDURE — 45378 DIAGNOSTIC COLONOSCOPY: CPT | Mod: 33 | Performed by: INTERNAL MEDICINE

## 2022-07-04 PROCEDURE — 87205 SMEAR GRAM STAIN: CPT | Performed by: FAMILY MEDICINE

## 2022-07-04 PROCEDURE — 87070 CULTURE OTHR SPECIMN AEROBIC: CPT | Performed by: FAMILY MEDICINE

## 2022-07-08 ENCOUNTER — OFFICE VISIT (OUTPATIENT)
Dept: FAMILY MEDICINE CLINIC | Facility: CLINIC | Age: 47
End: 2022-07-08

## 2022-07-08 VITALS
WEIGHT: 225 LBS | SYSTOLIC BLOOD PRESSURE: 130 MMHG | HEART RATE: 108 BPM | OXYGEN SATURATION: 97 % | HEIGHT: 69 IN | DIASTOLIC BLOOD PRESSURE: 80 MMHG | BODY MASS INDEX: 33.33 KG/M2

## 2022-07-08 DIAGNOSIS — B02.9 HERPES ZOSTER WITHOUT COMPLICATION: Primary | ICD-10-CM

## 2022-07-08 PROCEDURE — 99213 OFFICE O/P EST LOW 20 MIN: CPT | Performed by: NURSE PRACTITIONER

## 2022-07-08 RX ORDER — METHYLPREDNISOLONE 4 MG/1
TABLET ORAL
Qty: 1 EACH | Refills: 0 | Status: SHIPPED | OUTPATIENT
Start: 2022-07-08 | End: 2022-07-14

## 2022-07-08 NOTE — PROGRESS NOTES
"Chief Complaint  Rash    Subjective          Timo Hector presents to [facility]  History of Present Illness    Is here today with c/o rash in the right magdi which started over the weekend  Was seen in the urgent care and prescribed an antibiotic and valacyclovir    He is taking both medicines but denies any improvement  Continues to be painful    Is using triple antibiotic ointment with lidocaine which is helping the pain some    Denies that it is any larger    Review of Systems   Constitutional: Negative for fatigue and fever.   Skin: Positive for rash.        Painful rash in the right groin  Describes as a \"stabbing\" or like \"ripping of the skin\"         Objective   Vital Signs:  /80 (BP Location: Left arm, Patient Position: Sitting)   Pulse 108   Ht 175.3 cm (69.02\")   Wt 102 kg (225 lb)   SpO2 97%   BMI 33.21 kg/m²     BP Readings from Last 3 Encounters:   07/08/22 130/80   07/04/22 125/79   01/17/22 151/84        Wt Readings from Last 3 Encounters:   07/08/22 102 kg (225 lb)   07/04/22 99.8 kg (220 lb)   01/17/22 108 kg (238 lb)              Physical Exam  Vitals reviewed.   Constitutional:       Appearance: Normal appearance.   Cardiovascular:      Rate and Rhythm: Normal rate.      Pulses: Normal pulses.   Pulmonary:      Effort: Pulmonary effort is normal.   Skin:     General: Skin is warm.      Findings: Rash present.          Neurological:      Mental Status: He is alert and oriented to person, place, and time.        Result Review :                 Assessment and Plan    Diagnoses and all orders for this visit:    1. Herpes zoster without complication (Primary)  -     methylPREDNISolone (MEDROL) 4 MG dose pack; Take as directed on package instructions.  Dispense: 1 each; Refill: 0    continue valacyclovir, keep open to air and dry as best as possible  Try adding steroid  Has gabapentin at home will try for pain, may need a refill as it is no longer an " active medicine for him       Follow Up   Return if symptoms worsen or fail to improve.  Patient was given instructions and counseling regarding his condition or for health maintenance advice. Please see specific information pulled into the AVS if appropriate.

## 2022-07-14 ENCOUNTER — OFFICE VISIT (OUTPATIENT)
Dept: FAMILY MEDICINE CLINIC | Facility: CLINIC | Age: 47
End: 2022-07-14

## 2022-07-14 VITALS
RESPIRATION RATE: 16 BRPM | BODY MASS INDEX: 33.21 KG/M2 | WEIGHT: 225 LBS | HEART RATE: 97 BPM | OXYGEN SATURATION: 97 % | DIASTOLIC BLOOD PRESSURE: 80 MMHG | SYSTOLIC BLOOD PRESSURE: 124 MMHG

## 2022-07-14 DIAGNOSIS — L30.9 DERMATITIS: Primary | ICD-10-CM

## 2022-07-14 PROCEDURE — 99213 OFFICE O/P EST LOW 20 MIN: CPT | Performed by: NURSE PRACTITIONER

## 2022-07-14 RX ORDER — METHYLPREDNISOLONE 4 MG/1
TABLET ORAL
COMMUNITY
Start: 2022-07-08 | End: 2022-07-14

## 2022-07-14 RX ORDER — FLUCONAZOLE 150 MG/1
150 TABLET ORAL ONCE
Qty: 2 TABLET | Refills: 0 | Status: SHIPPED | OUTPATIENT
Start: 2022-07-14 | End: 2022-07-14

## 2022-07-14 NOTE — PROGRESS NOTES
Chief Complaint  Rash (Follow up)    Subjective          Timo Hector presents to [facility]  History of Present Illness    Was seen last week for groin rash that has appearance of shingles  Was put off from work because of pain related to location   Was prescribed antiviral therapy, had gabapentin at home to try for pain    Today he tells me that his rash is still there, but still present  Took the gabapentin on Friday night, started steroid on Saturday and forgot to take the gabapentin any more    He is finished with all prescribed medicines for the rash at this time, he is not putting anything on it    Review of Systems   Constitutional: Negative.  Negative for appetite change and fever.   Respiratory: Negative.  Negative for cough and shortness of breath.    Cardiovascular: Negative.  Negative for chest pain and palpitations.   Musculoskeletal: Negative.    Skin: Positive for rash.        Rash in the right groin continues to be present, but is improved     Objective   Vital Signs:  /80 (BP Location: Left arm)   Pulse 97   Resp 16   Wt 102 kg (225 lb)   SpO2 97%   BMI 33.21 kg/m²     BP Readings from Last 3 Encounters:   07/14/22 124/80   07/08/22 130/80   07/04/22 125/79        Wt Readings from Last 3 Encounters:   07/14/22 102 kg (225 lb)   07/08/22 102 kg (225 lb)   07/04/22 99.8 kg (220 lb)              Physical Exam  Vitals reviewed.   Constitutional:       Appearance: Normal appearance.   Cardiovascular:      Rate and Rhythm: Normal rate and regular rhythm.      Heart sounds: Normal heart sounds.   Pulmonary:      Effort: Pulmonary effort is normal.      Breath sounds: Normal breath sounds.   Skin:     General: Skin is warm.          Neurological:      Mental Status: He is alert and oriented to person, place, and time.      looks more like a fungal infection at this time, will try diflucan    Result Review :                 Assessment and Plan    Diagnoses  and all orders for this visit:    1. Dermatitis (Primary)  -     fluconazole (Diflucan) 150 MG tablet; Take 1 tablet by mouth 1 (One) Time for 1 dose. Take a second dose in 4 days  Dispense: 2 tablet; Refill: 0    consider a third dose of diflucan if not fully resolved with two       Follow Up   Return if symptoms worsen or fail to improve.  Patient was given instructions and counseling regarding his condition or for health maintenance advice. Please see specific information pulled into the AVS if appropriate.

## 2022-09-27 RX ORDER — LISINOPRIL AND HYDROCHLOROTHIAZIDE 25; 20 MG/1; MG/1
TABLET ORAL
Qty: 30 TABLET | Refills: 2 | Status: SHIPPED | OUTPATIENT
Start: 2022-09-27 | End: 2023-01-09

## 2023-01-09 RX ORDER — LISINOPRIL AND HYDROCHLOROTHIAZIDE 25; 20 MG/1; MG/1
TABLET ORAL
Qty: 30 TABLET | Refills: 2 | Status: SHIPPED | OUTPATIENT
Start: 2023-01-09 | End: 2023-01-17 | Stop reason: SDUPTHER

## 2023-01-13 NOTE — PROGRESS NOTES
"Chief Complaint  Sleep Apnea    Subjective          Timo Hector presents to [facility]UROLOG[facility]  History of Present Illness  CARON, patient uses a CPAP machine,he states he is benefiting from pap therapy, he uses a nasal mask and goes through [employer] for supplies.    NEUROPATHY-  bilateral foot and leg almost to the knee he states its now its starting to have some tingling in the finger tips.      Sleep testing history:  Sleep study Unavailable       PAP download:  The patient is on CPAP therapy at 7-15 cm/H2O.   Data indicates Excellent compliance. With 100% usage for more than 4 hours with an average usage of 7 hours 55 minutes. AHI down to 1.5 .  Average pressures 7.8.  Average large leak 12min.     The patient's hypersomnia has resolved       [place] Sleepiness Scale:  Sitting and reading N/A WatchingTV 1  Sitting, inactive, in a public place 1  As a passenger in a car for 1 hour w/o a break  3  Lying down to rest in the afternoon  1  Sitting and talking to someone  0  Sitting quietly after a lunch  1  In a car, while stopped for traffic or a light  0  Total 7    Review of Systems   Respiratory: Positive for apnea.    Neurological: Positive for numbness.   All other systems reviewed and are negative.        Objective   Vital Signs:   /74   Pulse 81   Temp 97.5 °F (36.4 °C) (Infrared)   Resp 16   Ht 175.3 cm (69.02\")   Wt 101 kg (223 lb)   BMI 32.91 kg/m²     Physical Exam  Vitals reviewed.   Pulmonary:      Effort: Pulmonary effort is normal. No respiratory distress.   Neurological:      Mental Status: He is alert and oriented to person, place, and time.   Psychiatric:         Mood and Affect: Mood normal.        Result Review :                 Assessment and Plan    Diagnoses and all orders for this visit:    1. Obstructive sleep apnea (Primary)    2. Peripheral polyneuropathy      Continue CPAP at 7-15  The patient is compliant with and benefiting from PAP therapy.    The " b12 has been low in the past so will repeat bit b test       Follow Up   Return in about 1 year (around 1/17/2024).    Patient was given instructions and counseling regarding his condition or for health maintenance advice. Please see specific information pulled into the AVS if appropriate.       This document has been electronically signed by Joseph Seipel, MD on January 17, 2023 09:04 EST

## 2023-01-17 ENCOUNTER — OFFICE VISIT (OUTPATIENT)
Dept: NEUROLOGY | Facility: CLINIC | Age: 48
End: 2023-01-17
Payer: COMMERCIAL

## 2023-01-17 VITALS
SYSTOLIC BLOOD PRESSURE: 113 MMHG | HEIGHT: 69 IN | BODY MASS INDEX: 33.03 KG/M2 | DIASTOLIC BLOOD PRESSURE: 74 MMHG | HEART RATE: 81 BPM | WEIGHT: 223 LBS | RESPIRATION RATE: 16 BRPM | TEMPERATURE: 97.5 F

## 2023-01-17 DIAGNOSIS — G62.9 PERIPHERAL POLYNEUROPATHY: ICD-10-CM

## 2023-01-17 DIAGNOSIS — G47.33 OBSTRUCTIVE SLEEP APNEA: Primary | ICD-10-CM

## 2023-01-17 PROBLEM — K21.9 ACID REFLUX: Status: ACTIVE | Noted: 2023-01-17

## 2023-01-17 PROBLEM — K76.0 FATTY LIVER: Status: ACTIVE | Noted: 2023-01-17

## 2023-01-17 PROCEDURE — 99214 OFFICE O/P EST MOD 30 MIN: CPT | Performed by: PSYCHIATRY & NEUROLOGY

## 2023-01-17 RX ORDER — LISINOPRIL AND HYDROCHLOROTHIAZIDE 25; 20 MG/1; MG/1
TABLET ORAL
COMMUNITY
Start: 2023-01-13

## 2023-04-16 RX ORDER — LISINOPRIL AND HYDROCHLOROTHIAZIDE 25; 20 MG/1; MG/1
TABLET ORAL
Qty: 30 TABLET | Refills: 1 | Status: SHIPPED | OUTPATIENT
Start: 2023-04-16 | End: 2023-07-05

## 2023-09-04 RX ORDER — LISINOPRIL AND HYDROCHLOROTHIAZIDE 25; 20 MG/1; MG/1
TABLET ORAL
Qty: 30 TABLET | Refills: 1 | Status: SHIPPED | OUTPATIENT
Start: 2023-09-04

## 2023-10-17 ENCOUNTER — TELEPHONE (OUTPATIENT)
Dept: NEUROLOGY | Facility: CLINIC | Age: 48
End: 2023-10-17
Payer: COMMERCIAL

## 2023-10-17 NOTE — TELEPHONE ENCOUNTER
PATIENT SAYS HE FORGOT TO GO FOR LAB WORK EARLIER THIS YEAR.    HE IS REQUESTING NEW LAB ORDERS BE PLACED IN HIS MYCHART  AND DOES HE NEED TO FAST?    PLEASE ADVISE PATIENT.

## 2023-10-19 DIAGNOSIS — G62.9 PERIPHERAL POLYNEUROPATHY: Primary | ICD-10-CM

## 2023-10-19 NOTE — TELEPHONE ENCOUNTER
Mychart message sent to patient letting them know that new orders were placed and that he can get his labs done.

## 2023-10-20 ENCOUNTER — LAB (OUTPATIENT)
Dept: LAB | Facility: HOSPITAL | Age: 48
End: 2023-10-20
Payer: COMMERCIAL

## 2023-10-20 LAB
ALBUMIN SERPL-MCNC: 5 G/DL (ref 3.5–5.2)
ALBUMIN/GLOB SERPL: 1.7 G/DL
ALP SERPL-CCNC: 50 U/L (ref 39–117)
ALT SERPL W P-5'-P-CCNC: 19 U/L (ref 1–41)
ANION GAP SERPL CALCULATED.3IONS-SCNC: 13.7 MMOL/L (ref 5–15)
AST SERPL-CCNC: 14 U/L (ref 1–40)
BILIRUB SERPL-MCNC: 0.7 MG/DL (ref 0–1.2)
BUN SERPL-MCNC: 16 MG/DL (ref 6–20)
BUN/CREAT SERPL: 19.5 (ref 7–25)
CALCIUM SPEC-SCNC: 10.1 MG/DL (ref 8.6–10.5)
CHLORIDE SERPL-SCNC: 97 MMOL/L (ref 98–107)
CO2 SERPL-SCNC: 25.3 MMOL/L (ref 22–29)
CREAT SERPL-MCNC: 0.82 MG/DL (ref 0.76–1.27)
CRP SERPL-MCNC: 0.8 MG/DL (ref 0–0.5)
EGFRCR SERPLBLD CKD-EPI 2021: 109 ML/MIN/1.73
FOLATE SERPL-MCNC: 17.2 NG/ML (ref 4.78–24.2)
GLOBULIN UR ELPH-MCNC: 3 GM/DL
GLUCOSE SERPL-MCNC: 125 MG/DL (ref 65–99)
POTASSIUM SERPL-SCNC: 4.4 MMOL/L (ref 3.5–5.2)
PROT SERPL-MCNC: 8 G/DL (ref 6–8.5)
SODIUM SERPL-SCNC: 136 MMOL/L (ref 136–145)
T4 FREE SERPL-MCNC: 1.4 NG/DL (ref 0.93–1.7)
TSH SERPL DL<=0.05 MIU/L-ACNC: 4.28 UIU/ML (ref 0.27–4.2)
VIT B12 BLD-MCNC: 419 PG/ML (ref 211–946)

## 2023-10-20 PROCEDURE — 86140 C-REACTIVE PROTEIN: CPT | Performed by: PSYCHIATRY & NEUROLOGY

## 2023-10-20 PROCEDURE — 80053 COMPREHEN METABOLIC PANEL: CPT | Performed by: PSYCHIATRY & NEUROLOGY

## 2023-10-20 PROCEDURE — 84443 ASSAY THYROID STIM HORMONE: CPT | Performed by: PSYCHIATRY & NEUROLOGY

## 2023-10-20 PROCEDURE — 84446 ASSAY OF VITAMIN E: CPT | Performed by: PSYCHIATRY & NEUROLOGY

## 2023-10-20 PROCEDURE — 84439 ASSAY OF FREE THYROXINE: CPT | Performed by: PSYCHIATRY & NEUROLOGY

## 2023-10-20 PROCEDURE — 82525 ASSAY OF COPPER: CPT | Performed by: PSYCHIATRY & NEUROLOGY

## 2023-10-20 PROCEDURE — 36415 COLL VENOUS BLD VENIPUNCTURE: CPT | Performed by: PSYCHIATRY & NEUROLOGY

## 2023-10-20 PROCEDURE — 82607 VITAMIN B-12: CPT | Performed by: PSYCHIATRY & NEUROLOGY

## 2023-10-20 PROCEDURE — 83921 ORGANIC ACID SINGLE QUANT: CPT | Performed by: PSYCHIATRY & NEUROLOGY

## 2023-10-20 PROCEDURE — 82746 ASSAY OF FOLIC ACID SERUM: CPT | Performed by: PSYCHIATRY & NEUROLOGY

## 2023-10-20 PROCEDURE — 84425 ASSAY OF VITAMIN B-1: CPT | Performed by: PSYCHIATRY & NEUROLOGY

## 2023-10-20 PROCEDURE — 86364 TISS TRNSGLTMNASE EA IG CLAS: CPT | Performed by: PSYCHIATRY & NEUROLOGY

## 2023-10-20 PROCEDURE — 84207 ASSAY OF VITAMIN B-6: CPT | Performed by: PSYCHIATRY & NEUROLOGY

## 2023-10-20 PROCEDURE — 86258 DGP ANTIBODY EACH IG CLASS: CPT | Performed by: PSYCHIATRY & NEUROLOGY

## 2023-10-20 PROCEDURE — 82784 ASSAY IGA/IGD/IGG/IGM EACH: CPT | Performed by: PSYCHIATRY & NEUROLOGY

## 2023-10-20 PROCEDURE — 84165 PROTEIN E-PHORESIS SERUM: CPT | Performed by: PSYCHIATRY & NEUROLOGY

## 2023-10-21 LAB
GLIADIN PEPTIDE IGA SER-ACNC: 12 UNITS (ref 0–19)
IGA SERPL-MCNC: 531 MG/DL (ref 90–386)
TTG IGA SER-ACNC: <2 U/ML (ref 0–3)

## 2023-10-23 LAB
ALBUMIN SERPL ELPH-MCNC: 3.6 G/DL (ref 2.9–4.4)
ALBUMIN/GLOB SERPL: 0.9 {RATIO} (ref 0.7–1.7)
ALPHA1 GLOB SERPL ELPH-MCNC: 0.3 G/DL (ref 0–0.4)
ALPHA2 GLOB SERPL ELPH-MCNC: 1 G/DL (ref 0.4–1)
B-GLOBULIN SERPL ELPH-MCNC: 1.6 G/DL (ref 0.7–1.3)
GAMMA GLOB SERPL ELPH-MCNC: 0.9 G/DL (ref 0.4–1.8)
GLOBULIN SER CALC-MCNC: 3.8 G/DL (ref 2.2–3.9)
LABORATORY COMMENT REPORT: ABNORMAL
M PROTEIN SERPL ELPH-MCNC: ABNORMAL G/DL
PROT PATTERN SERPL ELPH-IMP: ABNORMAL
PROT SERPL-MCNC: 7.4 G/DL (ref 6–8.5)

## 2023-10-24 LAB
METHYLMALONATE SERPL-SCNC: 168 NMOL/L (ref 0–378)
PYRIDOXAL PHOS SERPL-MCNC: 18.6 UG/L (ref 3.4–65.2)

## 2023-10-26 ENCOUNTER — OFFICE VISIT (OUTPATIENT)
Dept: FAMILY MEDICINE CLINIC | Facility: CLINIC | Age: 48
End: 2023-10-26
Payer: COMMERCIAL

## 2023-10-26 VITALS
HEIGHT: 69 IN | RESPIRATION RATE: 16 BRPM | WEIGHT: 221.2 LBS | SYSTOLIC BLOOD PRESSURE: 119 MMHG | DIASTOLIC BLOOD PRESSURE: 77 MMHG | OXYGEN SATURATION: 97 % | BODY MASS INDEX: 32.76 KG/M2 | TEMPERATURE: 98.2 F | HEART RATE: 91 BPM

## 2023-10-26 DIAGNOSIS — E03.9 ACQUIRED HYPOTHYROIDISM: Primary | ICD-10-CM

## 2023-10-26 DIAGNOSIS — E53.8 B12 DEFICIENCY: ICD-10-CM

## 2023-10-26 RX ORDER — LEVOTHYROXINE SODIUM 0.03 MG/1
25 TABLET ORAL DAILY
Qty: 90 TABLET | Refills: 3 | Status: SHIPPED | OUTPATIENT
Start: 2023-10-26 | End: 2024-10-25

## 2023-10-26 RX ORDER — CYANOCOBALAMIN 1000 UG/ML
1000 INJECTION, SOLUTION INTRAMUSCULAR; SUBCUTANEOUS
Status: SHIPPED | OUTPATIENT
Start: 2023-10-26

## 2023-10-26 RX ORDER — METHOCARBAMOL 500 MG/1
500 TABLET, FILM COATED ORAL 3 TIMES DAILY
COMMUNITY
Start: 2023-10-17 | End: 2023-11-01

## 2023-10-26 RX ADMIN — CYANOCOBALAMIN 1000 MCG: 1000 INJECTION, SOLUTION INTRAMUSCULAR; SUBCUTANEOUS at 11:47

## 2023-10-26 NOTE — PROGRESS NOTES
"Chief Complaint  Follow up on thyroid   Subjective        Timo Hector presents to [facility]  History of Present Illness  Pt comes in today for follow up on thyroid. Recently had some labs ordered by Dr. Seipel and from . TSH was borderline elevated on both checks. Had been on levothyroxine in the past, but has been about 2 years.   Has chronic fatigue. Has CARON. Uses cpap.   Has been having issues with tingling/numbness in feet. No hx of DM.   Seeing  neuro and neurosurgery.        Objective     Vital Signs:   /77 (BP Location: Right arm, Patient Position: Sitting, Cuff Size: Large Adult)   Pulse 91   Temp 98.2 °F (36.8 °C) (Infrared)   Resp 16   Ht 175.3 cm (69.02\")   Wt 100 kg (221 lb 3.2 oz)   SpO2 97%   BMI 32.65 kg/m²       BP Readings from Last 3 Encounters:   10/26/23 119/77   01/17/23 113/74   07/14/22 124/80       Wt Readings from Last 3 Encounters:   10/26/23 100 kg (221 lb 3.2 oz)   01/17/23 101 kg (223 lb)   07/14/22 102 kg (225 lb)     Physical Exam  Constitutional:       Appearance: He is well-developed.   Eyes:      Pupils: Pupils are equal, round, and reactive to light.   Cardiovascular:      Rate and Rhythm: Normal rate and regular rhythm.   Pulmonary:      Effort: Pulmonary effort is normal.      Breath sounds: Normal breath sounds.   Neurological:      Mental Status: He is alert and oriented to person, place, and time.        Result Review :                 Assessment and Plan    Diagnoses and all orders for this visit:    1. Acquired hypothyroidism (Primary)  -     levothyroxine (SYNTHROID, LEVOTHROID) 25 MCG tablet; Take 1 tablet by mouth Daily.  Dispense: 90 tablet; Refill: 3    2. B12 deficiency  -     cyanocobalamin injection 1,000 mcg    Restart levothyroxine  B12 injection today and monthly  Will follow up in 2 months for physical and will recheck TSH then.  Stats wife is nurse and asking about b12 injections to be given at " home.  During this office visit, we discussed the pertinent aspects of the visit and treatment recommendations. Pt verbalizes understanding. Follow up was discussed. Patient was given the opportunity to ask questions and discuss other concerns.   Discussed importance of regular exercise and recommended starting or continuing a regular exercise program for good health. The patient was also encouraged to lose weight for better health.         Follow Up   Return in about 2 months (around 12/26/2023) for Annual physical.  Patient was given instructions and counseling regarding his condition or for health maintenance advice. Please see specific information pulled into the AVS if appropriate.

## 2023-10-27 LAB
A-TOCOPHEROL VIT E SERPL-MCNC: 11.2 MG/L (ref 7–25.1)
GAMMA TOCOPHEROL SERPL-MCNC: 2.5 MG/L (ref 0.5–5.5)
VIT B1 BLD-SCNC: 177 NMOL/L (ref 66.5–200)

## 2023-10-30 ENCOUNTER — TELEPHONE (OUTPATIENT)
Dept: NEUROLOGY | Facility: CLINIC | Age: 48
End: 2023-10-30
Payer: COMMERCIAL

## 2023-10-30 NOTE — TELEPHONE ENCOUNTER
----- Message from Joseph F Seipel, MD sent at 10/29/2023  4:25 PM EDT -----  No definite abnormality on the labs the tsh was elevated but the T-4 was normal,  the CRP a measure of non specific inflammation was mildly elevated , the celiac disease antibody for gluten sensitivity was negative. F/U as planned with PCP regarding   the thyroid

## 2023-11-01 LAB — COPPER SERPL-MCNC: 154 UG/DL (ref 69–132)

## 2023-11-13 RX ORDER — LISINOPRIL AND HYDROCHLOROTHIAZIDE 25; 20 MG/1; MG/1
TABLET ORAL
Qty: 30 TABLET | Refills: 1 | Status: SHIPPED | OUTPATIENT
Start: 2023-11-13

## 2024-01-10 ENCOUNTER — LAB (OUTPATIENT)
Dept: FAMILY MEDICINE CLINIC | Facility: CLINIC | Age: 49
End: 2024-01-10
Payer: COMMERCIAL

## 2024-01-10 ENCOUNTER — OFFICE VISIT (OUTPATIENT)
Dept: FAMILY MEDICINE CLINIC | Facility: CLINIC | Age: 49
End: 2024-01-10
Payer: COMMERCIAL

## 2024-01-10 VITALS
DIASTOLIC BLOOD PRESSURE: 72 MMHG | SYSTOLIC BLOOD PRESSURE: 126 MMHG | HEIGHT: 69 IN | RESPIRATION RATE: 15 BRPM | OXYGEN SATURATION: 96 % | BODY MASS INDEX: 32.94 KG/M2 | HEART RATE: 69 BPM | WEIGHT: 222.4 LBS

## 2024-01-10 DIAGNOSIS — Z00.00 PREVENTATIVE HEALTH CARE: ICD-10-CM

## 2024-01-10 DIAGNOSIS — Z11.59 NEED FOR HEPATITIS C SCREENING TEST: ICD-10-CM

## 2024-01-10 DIAGNOSIS — Z23 IMMUNIZATION DUE: ICD-10-CM

## 2024-01-10 DIAGNOSIS — E53.8 B12 DEFICIENCY: ICD-10-CM

## 2024-01-10 DIAGNOSIS — Z00.00 PREVENTATIVE HEALTH CARE: Primary | ICD-10-CM

## 2024-01-10 LAB
ALBUMIN SERPL-MCNC: 5.1 G/DL (ref 3.5–5.2)
ALBUMIN/GLOB SERPL: 1.6 G/DL
ALP SERPL-CCNC: 55 U/L (ref 39–117)
ALT SERPL W P-5'-P-CCNC: 26 U/L (ref 1–41)
ANION GAP SERPL CALCULATED.3IONS-SCNC: 13.4 MMOL/L (ref 5–15)
AST SERPL-CCNC: 16 U/L (ref 1–40)
BASOPHILS # BLD AUTO: 0.03 10*3/MM3 (ref 0–0.2)
BASOPHILS NFR BLD AUTO: 0.3 % (ref 0–1.5)
BILIRUB SERPL-MCNC: 0.5 MG/DL (ref 0–1.2)
BUN SERPL-MCNC: 11 MG/DL (ref 6–20)
BUN/CREAT SERPL: 12.9 (ref 7–25)
CALCIUM SPEC-SCNC: 10.2 MG/DL (ref 8.6–10.5)
CHLORIDE SERPL-SCNC: 99 MMOL/L (ref 98–107)
CHOLEST SERPL-MCNC: 173 MG/DL (ref 0–200)
CO2 SERPL-SCNC: 24.6 MMOL/L (ref 22–29)
CREAT SERPL-MCNC: 0.85 MG/DL (ref 0.76–1.27)
DEPRECATED RDW RBC AUTO: 39.1 FL (ref 37–54)
EGFRCR SERPLBLD CKD-EPI 2021: 107.2 ML/MIN/1.73
EOSINOPHIL # BLD AUTO: 0.09 10*3/MM3 (ref 0–0.4)
EOSINOPHIL NFR BLD AUTO: 1 % (ref 0.3–6.2)
ERYTHROCYTE [DISTWIDTH] IN BLOOD BY AUTOMATED COUNT: 12.4 % (ref 12.3–15.4)
GLOBULIN UR ELPH-MCNC: 3.2 GM/DL
GLUCOSE SERPL-MCNC: 101 MG/DL (ref 65–99)
HBA1C MFR BLD: 5.9 % (ref 4.8–5.6)
HCT VFR BLD AUTO: 48.1 % (ref 37.5–51)
HCV AB SER DONR QL: NORMAL
HDLC SERPL-MCNC: 27 MG/DL (ref 40–60)
HGB BLD-MCNC: 16.1 G/DL (ref 13–17.7)
IMM GRANULOCYTES # BLD AUTO: 0.06 10*3/MM3 (ref 0–0.05)
IMM GRANULOCYTES NFR BLD AUTO: 0.7 % (ref 0–0.5)
LDLC SERPL CALC-MCNC: 117 MG/DL (ref 0–100)
LDLC/HDLC SERPL: 4.23 {RATIO}
LYMPHOCYTES # BLD AUTO: 2.39 10*3/MM3 (ref 0.7–3.1)
LYMPHOCYTES NFR BLD AUTO: 27.5 % (ref 19.6–45.3)
MCH RBC QN AUTO: 29.1 PG (ref 26.6–33)
MCHC RBC AUTO-ENTMCNC: 33.5 G/DL (ref 31.5–35.7)
MCV RBC AUTO: 86.8 FL (ref 79–97)
MONOCYTES # BLD AUTO: 0.49 10*3/MM3 (ref 0.1–0.9)
MONOCYTES NFR BLD AUTO: 5.6 % (ref 5–12)
NEUTROPHILS NFR BLD AUTO: 5.64 10*3/MM3 (ref 1.7–7)
NEUTROPHILS NFR BLD AUTO: 64.9 % (ref 42.7–76)
NRBC BLD AUTO-RTO: 0 /100 WBC (ref 0–0.2)
PLATELET # BLD AUTO: 463 10*3/MM3 (ref 140–450)
PMV BLD AUTO: 9.3 FL (ref 6–12)
POTASSIUM SERPL-SCNC: 4.3 MMOL/L (ref 3.5–5.2)
PROT SERPL-MCNC: 8.3 G/DL (ref 6–8.5)
RBC # BLD AUTO: 5.54 10*6/MM3 (ref 4.14–5.8)
SODIUM SERPL-SCNC: 137 MMOL/L (ref 136–145)
TRIGL SERPL-MCNC: 159 MG/DL (ref 0–150)
TSH SERPL DL<=0.05 MIU/L-ACNC: 3.3 UIU/ML (ref 0.27–4.2)
VIT B12 BLD-MCNC: >2000 PG/ML (ref 211–946)
VLDLC SERPL-MCNC: 29 MG/DL (ref 5–40)
WBC NRBC COR # BLD AUTO: 8.7 10*3/MM3 (ref 3.4–10.8)

## 2024-01-10 PROCEDURE — 82607 VITAMIN B-12: CPT | Performed by: NURSE PRACTITIONER

## 2024-01-10 PROCEDURE — 80061 LIPID PANEL: CPT | Performed by: NURSE PRACTITIONER

## 2024-01-10 PROCEDURE — 80050 GENERAL HEALTH PANEL: CPT | Performed by: NURSE PRACTITIONER

## 2024-01-10 PROCEDURE — 86803 HEPATITIS C AB TEST: CPT | Performed by: NURSE PRACTITIONER

## 2024-01-10 PROCEDURE — 36415 COLL VENOUS BLD VENIPUNCTURE: CPT

## 2024-01-10 PROCEDURE — 83036 HEMOGLOBIN GLYCOSYLATED A1C: CPT | Performed by: NURSE PRACTITIONER

## 2024-01-10 RX ORDER — CYANOCOBALAMIN 1000 UG/ML
1000 INJECTION, SOLUTION INTRAMUSCULAR; SUBCUTANEOUS
Status: SHIPPED | OUTPATIENT
Start: 2024-01-18

## 2024-01-10 RX ORDER — BACLOFEN 5 MG/1
5 TABLET ORAL 3 TIMES DAILY
COMMUNITY
Start: 2023-11-28

## 2024-01-10 RX ADMIN — CYANOCOBALAMIN 1000 MCG: 1000 INJECTION, SOLUTION INTRAMUSCULAR; SUBCUTANEOUS at 10:40

## 2024-01-10 NOTE — PROGRESS NOTES
"Chief Complaint  Annual Exam  Subjective        Timo Hector presents to [facility]  History of Present Illness  Pt comes in today for routine physical.  No specific concerns today.        Objective     Vital Signs:   /72   Pulse 69   Resp 15   Ht 175.3 cm (69\")   Wt 101 kg (222 lb 6.4 oz)   SpO2 96%   BMI 32.84 kg/m²       BP Readings from Last 3 Encounters:   01/10/24 126/72   10/26/23 119/77   01/17/23 113/74       Wt Readings from Last 3 Encounters:   01/10/24 101 kg (222 lb 6.4 oz)   10/26/23 100 kg (221 lb 3.2 oz)   01/17/23 101 kg (223 lb)     Physical Exam  Constitutional:       Appearance: He is well-developed.   HENT:      Head: Normocephalic.   Eyes:      Conjunctiva/sclera: Conjunctivae normal.      Pupils: Pupils are equal, round, and reactive to light.   Neck:      Thyroid: No thyromegaly.   Cardiovascular:      Rate and Rhythm: Normal rate and regular rhythm.      Heart sounds: No murmur heard.  Pulmonary:      Effort: Pulmonary effort is normal.      Breath sounds: Normal breath sounds.   Abdominal:      General: Bowel sounds are normal.      Palpations: Abdomen is soft.      Tenderness: There is no abdominal tenderness.   Musculoskeletal:         General: Normal range of motion.      Cervical back: Normal range of motion and neck supple.   Skin:     General: Skin is warm and dry.      Findings: No lesion.   Neurological:      Mental Status: He is alert and oriented to person, place, and time.   Psychiatric:         Behavior: Behavior normal.        Result Review :                 Assessment and Plan    Diagnoses and all orders for this visit:    1. Preventative health care (Primary)  -     Hepatitis C Antibody; Future  -     CBC & Differential; Future  -     Comprehensive Metabolic Panel; Future  -     Hemoglobin A1c; Future  -     Lipid Panel; Future  -     TSH; Future    2. Need for hepatitis C screening test  -     Hepatitis C Antibody; " Future    3. Immunization due  -     Tdap Vaccine Greater Than or Equal To 8yo IM    Check labs  Tdap  During this visit for their annual exam, we reviewed their personal history, social history and family history. We went over their medications and all the recommended health maintenance items for their age group. They were given the opportunity to ask questions and discuss other concerns.   Discussed importance of regular exercise and recommended starting or continuing a regular exercise program for good health. The patient was also encouraged to lose weight for better health.           Follow Up   No follow-ups on file.  Patient was given instructions and counseling regarding his condition or for health maintenance advice. Please see specific information pulled into the AVS if appropriate.

## 2024-01-11 ENCOUNTER — TELEPHONE (OUTPATIENT)
Dept: FAMILY MEDICINE CLINIC | Facility: CLINIC | Age: 49
End: 2024-01-11
Payer: COMMERCIAL

## 2024-01-11 NOTE — TELEPHONE ENCOUNTER
HUB to share. Tried to call patient, vm is full.   ----- Message from Rita John RN sent at 1/11/2024  9:43 AM EST -----    ----- Message -----  From: Stacie Miller APRN  Sent: 1/11/2024   7:26 AM EST  To: Rita John RN    A1C went up to 5.9 (prediabetic range). Needs to work on diet and try to avoid sweets and also work on weight loss.   Will monitor this closely and probably recheck in 6 months      Room assigned 227

## 2024-01-11 NOTE — PROGRESS NOTES
A1C went up to 5.9 (prediabetic range). Needs to work on diet and try to avoid sweets and also work on weight loss.   Will monitor this closely and probably recheck in 6 months

## 2024-01-16 NOTE — PROGRESS NOTES
"Chief Complaint  Sleep Apnea    Subjective          Timo Hector presents to [facility]  History of Present Illness  CARON, f/u patient states he is benefiting from pap therapy, he uses a nasal mask and goes through [employer] for supplies.patient states he needs a new machine his machine is 5+ yrs old   Sleep testing history:  first tested at 2010  Sleep study Unavailable    PAP download: not availabe  Bascom Sleepiness Scale:  Sitting and reading 1 WatchingTV 2  Sitting, inactive, in a public place 3  As a passenger in a car for 1 hour w/o a break  3  Lying down to rest in the afternoon  2  Sitting and talking to someone  0  Sitting quietly after a lunch  2  In a car, while stopped for traffic or a light  0  Total 13    Neuropathy, gradually getting worse, numbness to knees,, now in hands      Review of Systems   Eyes:  Positive for visual disturbance.   Neurological:  Positive for dizziness.   All other systems reviewed and are negative.        Objective   Vital Signs:   /84   Pulse 92   Resp 16   Ht 175.3 cm (69\")   Wt 97.5 kg (215 lb)   BMI 31.75 kg/m²     Physical Exam  Vitals reviewed.   Cardiovascular:      Rate and Rhythm: Normal rate.   Pulmonary:      Effort: Pulmonary effort is normal.   Neurological:      General: No focal deficit present.      Mental Status: He is alert.   Psychiatric:         Mood and Affect: Mood normal.        Result Review :                 Assessment and Plan    Diagnoses and all orders for this visit:    1. Obstructive sleep apnea (Primary)    2. Peripheral polyneuropathy      Will order new CPAP min 7 max 15  The patient is compliant with and benefiting from PAP therapy per patient    He has had a recent b-12   Gabapentin seemed to make the throbbing pain in the feet, has burning pain in the hands and feet.      Follow Up   Return in about 1 year (around 1/17/2025).    Patient was given instructions and counseling regarding his condition " or for health maintenance advice. Please see specific information pulled into the AVS if appropriate.       This document has been electronically signed by Joseph Seipel, MD on January 17, 2024 09:35 EST

## 2024-01-17 ENCOUNTER — OFFICE VISIT (OUTPATIENT)
Dept: NEUROLOGY | Facility: CLINIC | Age: 49
End: 2024-01-17
Payer: COMMERCIAL

## 2024-01-17 VITALS
HEIGHT: 69 IN | SYSTOLIC BLOOD PRESSURE: 127 MMHG | DIASTOLIC BLOOD PRESSURE: 84 MMHG | HEART RATE: 92 BPM | WEIGHT: 215 LBS | RESPIRATION RATE: 16 BRPM | BODY MASS INDEX: 31.84 KG/M2

## 2024-01-17 DIAGNOSIS — G47.33 OBSTRUCTIVE SLEEP APNEA: Primary | ICD-10-CM

## 2024-01-17 DIAGNOSIS — G62.9 PERIPHERAL POLYNEUROPATHY: ICD-10-CM

## 2024-01-17 PROCEDURE — 99213 OFFICE O/P EST LOW 20 MIN: CPT | Performed by: PSYCHIATRY & NEUROLOGY

## 2024-01-22 RX ORDER — LISINOPRIL AND HYDROCHLOROTHIAZIDE 25; 20 MG/1; MG/1
TABLET ORAL
Qty: 30 TABLET | Refills: 1 | Status: SHIPPED | OUTPATIENT
Start: 2024-01-22

## 2024-03-26 RX ORDER — LISINOPRIL AND HYDROCHLOROTHIAZIDE 25; 20 MG/1; MG/1
TABLET ORAL
Qty: 30 TABLET | Refills: 1 | Status: SHIPPED | OUTPATIENT
Start: 2024-03-26

## 2024-06-04 RX ORDER — LISINOPRIL AND HYDROCHLOROTHIAZIDE 25; 20 MG/1; MG/1
TABLET ORAL
Qty: 30 TABLET | Refills: 1 | Status: SHIPPED | OUTPATIENT
Start: 2024-06-04

## 2024-08-12 RX ORDER — LISINOPRIL AND HYDROCHLOROTHIAZIDE 25; 20 MG/1; MG/1
TABLET ORAL
Qty: 30 TABLET | Refills: 1 | Status: SHIPPED | OUTPATIENT
Start: 2024-08-12

## 2024-10-03 RX ORDER — LISINOPRIL AND HYDROCHLOROTHIAZIDE 20; 25 MG/1; MG/1
TABLET ORAL
Qty: 30 TABLET | Refills: 1 | Status: SHIPPED | OUTPATIENT
Start: 2024-10-03

## 2024-11-05 ENCOUNTER — OFFICE VISIT (OUTPATIENT)
Dept: FAMILY MEDICINE CLINIC | Facility: CLINIC | Age: 49
End: 2024-11-05
Payer: COMMERCIAL

## 2024-11-05 VITALS
OXYGEN SATURATION: 99 % | BODY MASS INDEX: 32.73 KG/M2 | HEART RATE: 107 BPM | WEIGHT: 221 LBS | DIASTOLIC BLOOD PRESSURE: 89 MMHG | HEIGHT: 69 IN | RESPIRATION RATE: 18 BRPM | SYSTOLIC BLOOD PRESSURE: 126 MMHG

## 2024-11-05 DIAGNOSIS — G62.9 NEUROPATHY: ICD-10-CM

## 2024-11-05 DIAGNOSIS — S99.922A INJURY OF LEFT GREAT TOE, INITIAL ENCOUNTER: Primary | ICD-10-CM

## 2024-11-05 PROCEDURE — 99213 OFFICE O/P EST LOW 20 MIN: CPT | Performed by: NURSE PRACTITIONER

## 2024-11-05 RX ORDER — DULOXETIN HYDROCHLORIDE 60 MG/1
60 CAPSULE, DELAYED RELEASE ORAL DAILY
COMMUNITY

## 2024-11-05 NOTE — PROGRESS NOTES
"Chief Complaint  Toe Pain (Left big toe pain. )  Subjective        Timo Hector presents to [facility]  History of Present Illness  Pt comes in today with c/o blister on left great toe.  States he had an injury about 1.5 years ago. It healed, but now with blister again and discoloration.  Has neuropathy and doesn't feel much in his feet.        Objective     Vital Signs:   /89   Pulse 107   Resp 18   Ht 175.3 cm (69.02\")   Wt 100 kg (221 lb)   SpO2 99%   BMI 32.62 kg/m²       BP Readings from Last 3 Encounters:   11/05/24 126/89   01/17/24 127/84   01/10/24 126/72       Wt Readings from Last 3 Encounters:   11/05/24 100 kg (221 lb)   01/17/24 97.5 kg (215 lb)   01/10/24 101 kg (222 lb 6.4 oz)     Physical Exam  Constitutional:       Appearance: He is well-developed.   Eyes:      Pupils: Pupils are equal, round, and reactive to light.   Cardiovascular:      Rate and Rhythm: Regular rhythm.   Pulmonary:      Effort: Pulmonary effort is normal.   Musculoskeletal:        Feet:    Feet:      Right foot:      Toenail Condition: Right toenails are abnormally thick.      Left foot:      Toenail Condition: Left toenails are abnormally thick.      Comments: Blister to top of left great toe   Some discoloration to toes as well.   No redness  No signs of infection   Decreased sensation   Neurological:      Mental Status: He is alert and oriented to person, place, and time.        Result Review :                 Assessment and Plan    Diagnoses and all orders for this visit:    1. Injury of left great toe, initial encounter (Primary)  -     Ambulatory Referral to Podiatry    2. Neuropathy  -     Ambulatory Referral to Podiatry    Referral to podiatry  During this office visit, we discussed the pertinent aspects of the visit and treatment recommendations. Pt verbalizes understanding. Follow up was discussed. Patient was given the opportunity to ask questions and discuss other " concerns.         Follow Up   Return in about 3 months (around 2/5/2025) for Annual physical.  Patient was given instructions and counseling regarding his condition or for health maintenance advice. Please see specific information pulled into the AVS if appropriate.

## 2024-12-03 ENCOUNTER — OFFICE VISIT (OUTPATIENT)
Age: 49
End: 2024-12-03
Payer: COMMERCIAL

## 2024-12-03 VITALS
HEART RATE: 99 BPM | OXYGEN SATURATION: 98 % | WEIGHT: 221 LBS | HEIGHT: 69 IN | BODY MASS INDEX: 32.73 KG/M2 | RESPIRATION RATE: 20 BRPM

## 2024-12-03 DIAGNOSIS — G62.9 NEUROPATHY: ICD-10-CM

## 2024-12-03 DIAGNOSIS — R09.89 DECREASED PEDAL PULSES: Primary | ICD-10-CM

## 2024-12-03 DIAGNOSIS — L97.521 TOE ULCER, LEFT, LIMITED TO BREAKDOWN OF SKIN: ICD-10-CM

## 2024-12-05 NOTE — PROGRESS NOTES
12/03/2024  Foot and Ankle Surgery - New Patient   Provider: ELSIE Lomeli   Location: [facility]    Subjective:  Timo Hector is a 48 y.o. male.     Chief Complaint   Patient presents with    Left Foot - Peripheral Neuropathy, Initial Evaluation     Great toe has a sore on the tip of it    Right Foot - Peripheral Neuropathy, Initial Evaluation    Initial Evaluation     Stacie Miller, APRNPCP - 11/5/24       History of Present Illness  The patient is a 48-year-old male who presents for evaluation of neuropathy.    He reports a scab on the side of his left foot, which he was previously unaware of. Approximately a month ago, he noticed a purple discoloration on his toe. He has been experiencing cold feet, a symptom that has persisted for 10 to 15 years. This symptom is bilateral, affecting both feet. The numbness initially presented in his toes, and he first became aware of it when he was unable to feel his fingers while clipping his toenails. He has undergone an EMG test and has abstained from alcohol for the past 14 years. He has had x-rays of his back, but no MRIs. He occasionally experiences lower back pain, particularly when bending over for extended periods, and finds it painful to straighten up. He is currently not working and does not plan to go out in the cold. He does not own open-toed sandals and prefers to wear socks or crocs at home. He has been prescribed medication for tingling and burning sensations, which has been effective for his feet but not his hands. He has a history of high blood pressure and has been on gabapentin for several years, which he discontinued due to severe foot pain. He also stopped taking all his medications, except for his blood pressure medication, which resulted in the resolution of his foot pain.    Supplemental Information  He underwent knee surgery in 2023, which involved repositioning of the kneecap and cleaning. This procedure alleviated the  tingling sensation, but he still experiences numbness.    SOCIAL HISTORY  He used to be a heavy drinker but has not consumed alcohol in 14 years.             Allergies   Allergen Reactions    Lyrica [Pregabalin] Other (See Comments)     Chest pain       Past Medical History:   Diagnosis Date    Abscess of leg, right 08/02/2017    Acute pharyngitis 03/15/2015    B12 deficiency 06/22/2018    Cellulitis 11/14/2014    Edema     Elbow pain 03/02/2015    Foot pain, left 09/20/2018    Gout 11/11/2014    Headache 02/21/2014    Hernia of abdominal cavity 03/02/2015    Hypertension 11/11/2014    Hypothyroidism 10/26/2017    Knee pain, right 04/15/2017    Laceration without foreign body of unspecified toe without damage to nail, initial encounter 06/22/2018    Leg pain, left     Lipoma of skin     Metatarsalgia, left foot 02/21/2019    Myalgia 10/26/2017    Obesity 02/21/2014    Obstructive sleep apnea 02/21/2014    Osteoarthritis 02/21/2014    Paresthesia 01/28/2016    Peripheral neuropathy 10/26/2017    Plantar fasciitis of right foot 01/28/2016    Pre-diabetes 06/22/2018    Renal insufficiency 06/22/2018    RUQ pain 06/01/2017    Sinusitis, acute 12/04/2015    Skin lesion 03/02/2015    Snoring     Sprain of tarsometatarsal ligament of left foot 01/11/2019    INITIAL ENCOUNTER    Stomatitis and mucositis 03/15/2015    (ULCERATIVE)    Unspecified fracture of left foot, initial encounter for closed fracture 09/20/2018    URI (upper respiratory infection) 01/15/2015    Vitamin B deficiency, unspecified 02/12/2016       Past Surgical History:   Procedure Laterality Date    HERNIA REPAIR      PILONIDAL CYSTECTOMY         Family History   Problem Relation Age of Onset    Cancer Mother     Hypertension Father     Osteoarthritis Father     No Known Problems Sister     Diabetes Brother        Social History     Socioeconomic History    Marital status:     Number of children: 1   Tobacco Use    Smoking status: Never     "Smokeless tobacco: Current     Types: Chew   Vaping Use    Vaping status: Never Used   Substance and Sexual Activity    Alcohol use: Not Currently     Comment: sober since 2010    Drug use: Never    Sexual activity: Defer        Current Outpatient Medications on File Prior to Visit   Medication Sig Dispense Refill    DULoxetine (CYMBALTA) 60 MG capsule Take 1 capsule by mouth Daily.      lisinopril-hydrochlorothiazide (PRINZIDE,ZESTORETIC) 20-25 MG per tablet TAKE 1 TABLET BY MOUTH DAILY 30 tablet 1    levothyroxine (SYNTHROID, LEVOTHROID) 25 MCG tablet Take 1 tablet by mouth Daily. 90 tablet 3     Current Facility-Administered Medications on File Prior to Visit   Medication Dose Route Frequency Provider Last Rate Last Admin    cyanocobalamin injection 1,000 mcg  1,000 mcg Intramuscular Q28 Days Stacie Miller APRN               Objective   Pulse 99   Resp 20   Ht 175.3 cm (69\")   Wt 100 kg (221 lb)   SpO2 98%   BMI 32.64 kg/m²     Foot/Ankle Exam    GENERAL  Appearance:  appears stated age  Orientation:  AAOx3  Affect:  appropriate  Gait:  unimpaired  Assistance:  independent  Right shoe gear: casual shoe and sock  Left shoe gear: casual shoe and sock    VASCULAR     Right Foot Vascularity   Dorsalis pedis:  1+  Skin temperature:  cool  Edema grading:  None  CFT:  < 3 seconds  Pedal hair growth:  Present  Varicosities:  none     Left Foot Vascularity   Dorsalis pedis:  1+  Skin temperature:  cool  Edema grading:  None  CFT:  < 3 seconds  Pedal hair growth:  Present  Varicosities:  none     NEUROLOGIC     Right Foot Neurologic   Light touch sensation: diminished  Protective Sensation using [place] Monofilament:   Sites tested: 10     Left Foot Neurologic   Light touch sensation: diminished  Protective Sensation using [place] Monofilament:   Sites tested: 10    MUSCULOSKELETAL     Right Foot Musculoskeletal   Ecchymosis:  none  Tenderness:  none       Left Foot Musculoskeletal   Ecchymosis:  " none  Tenderness:  none    DERMATOLOGIC      Right Foot Dermatologic   Skin  Positive for dryness and skin changes.      Left Foot Dermatologic   Skin  Positive for corn, dryness and skin changes.   Physical Exam       Results         Assessment & Plan   Diagnoses and all orders for this visit:    1. Decreased pedal pulses (Primary)  -     Doppler Ankle Brachial Index Single Level CAR; Future    2. Neuropathy    3. Toe ulcer, left, limited to breakdown of skin      Assessment & Plan  1. Neuropathy.  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 He reports numbness in both feet for the past 10-15 years, which started in his toes. He is not diabetic but is prediabetic. He has a history of high blood pressure and has taken gabapentin in the past, which worsened his symptoms. He is currently on medication for tingling and burning, which helps with his feet but not his hands. He has a palbale pulse in both feet, indicating adequate blood flow. A compounded topical pain cream will be prescribed to manage his pain symptoms. He is advised to apply Aquaphor and a Band-Aid daily to his toe to soften the area and prevent further irritation. He is also recommended to wear open-toed shoes to avoid rubbing on the affected toe. Surgical shoe will be provided today.     2. Left toe callus/ wound  The wound on his left foot appears to be a result of friction from his shoe and does not exhibit signs of infection. Area is primarily a callus with superficial open areas total area measuring approx. 1x1x0.1cm. The purple discoloration may be a residual effect of a previous injury. A blood flow test will be conducted to ensure there are no circulatory issues contributing to the wound or to his symptoms. He is advised to apply Betadine and a Band-Aid daily to the wound.    Follow-up  The patient will follow up in 3  to 4 weeks.      No orders of the defined types were placed in this encounter.         Patient or patient representative verbalized consent for the use of Ambient Listening during the visit with  ELSIE Chang for chart documentation. 12/5/2024  10:06 EST

## 2024-12-11 ENCOUNTER — HOSPITAL ENCOUNTER (OUTPATIENT)
Dept: CARDIOLOGY | Facility: HOSPITAL | Age: 49
Discharge: HOME | End: 2024-12-11
Payer: COMMERCIAL

## 2024-12-11 DIAGNOSIS — R09.89 DECREASED PEDAL PULSES: ICD-10-CM

## 2024-12-11 LAB
BH CV LOWER ARTERIAL LEFT ABI RATIO: 1.31
BH CV LOWER ARTERIAL LEFT DORSALIS PEDIS SYS MAX: 179
BH CV LOWER ARTERIAL LEFT GREAT TOE SYS MAX: 35
BH CV LOWER ARTERIAL LEFT POST TIBIAL SYS MAX: 147
BH CV LOWER ARTERIAL LEFT TBI RATIO: 0.26
BH CV LOWER ARTERIAL RIGHT ABI RATIO: 1.13
BH CV LOWER ARTERIAL RIGHT DORSALIS PEDIS SYS MAX: 155
BH CV LOWER ARTERIAL RIGHT GREAT TOE SYS MAX: 83
BH CV LOWER ARTERIAL RIGHT POST TIBIAL SYS MAX: 144
BH CV LOWER ARTERIAL RIGHT TBI RATIO: 0.61
UPPER ARTERIAL LEFT ARM BRACHIAL SYS MAX: 133
UPPER ARTERIAL RIGHT ARM BRACHIAL SYS MAX: 137

## 2024-12-11 PROCEDURE — 93922 UPR/L XTREMITY ART 2 LEVELS: CPT

## 2024-12-13 DIAGNOSIS — R68.89 ABNORMAL ANKLE BRACHIAL INDEX (ABI): Primary | ICD-10-CM

## 2024-12-13 NOTE — PROGRESS NOTES
Please call patient and let him know blood flow to ankles was normal per the trev test. The trev showed mild decreased blood flow to the right toes and moderate decreased blood flow to the left. I recommend referral to vascular specialist for further evaluation. I will place referral order now.

## 2024-12-17 ENCOUNTER — TELEPHONE (OUTPATIENT)
Age: 49
End: 2024-12-17
Payer: COMMERCIAL

## 2024-12-17 NOTE — TELEPHONE ENCOUNTER
----- Message from Salena CARPIO sent at 12/16/2024  2:55 PM EST -----  I called pt to give him results, no answer, I left vm for him to call back for the results.  ----- Message -----  From: Brenda Song APRN  Sent: 12/13/2024   3:41 PM EST  To: [facility]    Please call patient and let him know blood flow to ankles was normal per the trev test. The trev showed mild decreased blood flow to the right toes and moderate decreased blood flow to the left. I recommend referral to vascular specialist for further evaluation. I will place referral order now.

## 2024-12-19 ENCOUNTER — TELEPHONE (OUTPATIENT)
Age: 49
End: 2024-12-19
Payer: COMMERCIAL

## 2024-12-19 NOTE — TELEPHONE ENCOUNTER
----- Message from Salena CARPIO sent at 12/18/2024  3:37 PM EST -----  I called pts number and it went directly to , I left  to call back for results.  ----- Message -----  From: Brenda Song APRN  Sent: 12/13/2024   3:41 PM EST  To: [facility]    Please call patient and let him know blood flow to ankles was normal per the trev test. The trev showed mild decreased blood flow to the right toes and moderate decreased blood flow to the left. I recommend referral to vascular specialist for further evaluation. I will place referral order now.

## 2024-12-19 NOTE — TELEPHONE ENCOUNTER
.        Hub staff attempted to follow warm transfer process and was unsuccessful     Caller: IMANI THAYER    Relationship to patient: PATIENT    Best call back number: [phone]    Patient is needing: PATIENT RETURNED CALL

## 2024-12-29 DIAGNOSIS — E03.9 ACQUIRED HYPOTHYROIDISM: ICD-10-CM

## 2024-12-29 RX ORDER — LEVOTHYROXINE SODIUM 25 UG/1
25 TABLET ORAL DAILY
Qty: 90 TABLET | Refills: 1 | Status: SHIPPED | OUTPATIENT
Start: 2024-12-29 | End: 2025-12-29

## 2024-12-31 ENCOUNTER — OFFICE VISIT (OUTPATIENT)
Age: 49
End: 2024-12-31
Payer: COMMERCIAL

## 2024-12-31 VITALS — BODY MASS INDEX: 32.58 KG/M2 | WEIGHT: 220 LBS | HEIGHT: 69 IN | HEART RATE: 85 BPM | OXYGEN SATURATION: 97 %

## 2024-12-31 DIAGNOSIS — L84 CALLUS OF TOE: ICD-10-CM

## 2024-12-31 DIAGNOSIS — G62.9 NEUROPATHY: ICD-10-CM

## 2024-12-31 DIAGNOSIS — R68.89 ABNORMAL ANKLE BRACHIAL INDEX (ABI): Primary | ICD-10-CM

## 2024-12-31 NOTE — PROGRESS NOTES
12/31/2024  Foot and Ankle Surgery - Established Patient/Follow-up  Provider: ELSIE Lomeli   Location: [facility]    Subjective:  Timo Hector is a 49 y.o. male.     Chief Complaint   Patient presents with    Left Foot - Follow-up, Peripheral Neuropathy, Skin Ulcer    Follow-Up     Stacie Miller APRNPCP --11/5/24       History of Present Illness  The patient is a 49-year-old male who presents for reevaluation of numbness in his feet. He is accompanied by his wife.    He continues to experience significant numbness in both feet, despite having undergone a nerve conduction test. He has an upcoming appointment with a vascular surgeon on 01/29/2025. His wife expresses concern about the recurrent nature of the issue, as this is the second instance of such a problem. The initial occurrence was attributed to an incident involving a jackhammer, which he was unaware of due to the lack of sensation in his feet. His wife also mentions that he plans to return to work and inquires about the potential benefits of wearing diabetic socks during his work hours. She recalls an incident where she accidentally cut his toe while trimming his toenails, but he did not perceive any pain. She has suggested that he consider getting a pedicure. She notes that he has been losing hair on his legs, a new development that causes her concern. She also observes that his legs are cooler up to the knee level, a condition that has progressively worsened over the past decade. She questions whether his insurance would cover the cost of diabetic socks.    He reports experiencing lower back pain, particularly when bending over, as he did yesterday while using a chainsaw. He has undergone x-rays, but no MRI has been conducted. His wife adds that they are currently paying out-of-pocket for his EMG.       Allergies   Allergen Reactions    Lyrica [Pregabalin] Other (See Comments)     Chest pain       Current Outpatient  "Medications on File Prior to Visit   Medication Sig Dispense Refill    DULoxetine (CYMBALTA) 60 MG capsule Take 1 capsule by mouth Daily.      levothyroxine (SYNTHROID, LEVOTHROID) 25 MCG tablet TAKE 1 TABLET BY MOUTH DAILY 90 tablet 1    lisinopril-hydrochlorothiazide (PRINZIDE,ZESTORETIC) 20-25 MG per tablet TAKE 1 TABLET BY MOUTH DAILY 30 tablet 1     Current Facility-Administered Medications on File Prior to Visit   Medication Dose Route Frequency Provider Last Rate Last Admin    cyanocobalamin injection 1,000 mcg  1,000 mcg Intramuscular Q28 Days Stacie Miller, APRN           Objective   Pulse 85   Ht 175.3 cm (69\")   Wt 99.8 kg (220 lb)   SpO2 97%   BMI 32.49 kg/m²     Foot/Ankle Exam  Physical Exam     GENERAL  Appearance:  appears stated age  Orientation:  AAOx3  Affect:  appropriate  Gait:  unimpaired  Assistance:  independent  Right shoe gear: casual shoe and sock  Left shoe gear: casual shoe and sock     VASCULAR      Right Foot Vascularity   Dorsalis pedis:  1+  Skin temperature:  cool  Edema grading:  None  CFT:  < 3 seconds  Pedal hair growth:  Present  Varicosities:  none      Left Foot Vascularity   Dorsalis pedis:  1+  Skin temperature:  cool  Edema grading:  None  CFT:  < 3 seconds  Pedal hair growth:  Present  Varicosities:  none     NEUROLOGIC      Right Foot Neurologic   Light touch sensation: diminished  Protective Sensation using Dawson-Evie Monofilament:   Sites tested: 10      Left Foot Neurologic   Light touch sensation: diminished  Protective Sensation using Dawson-Evie Monofilament:   Sites tested: 10     MUSCULOSKELETAL      Right Foot Musculoskeletal   Ecchymosis:  none  Tenderness:  none        Left Foot Musculoskeletal   Ecchymosis:  none  Tenderness:  none     DERMATOLOGIC       Right Foot Dermatologic   Skin  Positive for dryness and skin changes.       Left Foot Dermatologic   Skin  Positive for callus, dryness and skin changes.     Results  Testing  GIGI showed " normal blood flow to both ankles, mild digital ischemia in the toes of the right foot, and moderate decreased blood flow to the toes of the left foot.     Assessment & Plan   Diagnoses and all orders for this visit:    1. Abnormal ankle brachial index (GIGI) (Primary)    2. Neuropathy    3. Callus of toe      Assessment & Plan  1. Bilateral foot numbness.  The Ankle-Brachial Index (GIGI) test results indicate normal blood flow to both ankles, with mild digital ischemia in the right foot toes and moderate digital ischemia in the left foot toes. A small, stable sore is present on the tip of his toe, which does not appear to be infected. The sore has healed, leaving a dried scab. He is not diabetic but exhibits symptoms similar to diabetic neuropathy. Daily foot inspection, wearing protective footwear, avoiding barefoot walking, and checking shoes daily are recommended. Gradual increase in wear time for new footwear is suggested. He should wear high-quality white socks and carry an extra pair to change midday. The use of Aquaphor is recommended to keep the area moisturized. He is advised to treat his feet as if he were diabetic, keeping them clean, dry, and moisturized. He should follow up with the vascular surgeon as scheduled.      Follow-up  The patient will follow up in 1 year.       No orders of the defined types were placed in this encounter.         Patient or patient representative verbalized consent for the use of Ambient Listening during the visit with  ELSIE Chang for chart documentation. 12/31/2024  09:32 EST

## 2025-01-14 RX ORDER — LISINOPRIL AND HYDROCHLOROTHIAZIDE 20; 25 MG/1; MG/1
1 TABLET ORAL DAILY
Qty: 30 TABLET | Refills: 1 | Status: SHIPPED | OUTPATIENT
Start: 2025-01-14

## 2025-01-14 NOTE — TELEPHONE ENCOUNTER
Caller: Timo Hetcor    Relationship: Self    Best call back number:     [phone] (Mobile)       Requested Prescriptions:   Requested Prescriptions     Pending Prescriptions Disp Refills    lisinopril-hydrochlorothiazide (PRINZIDE,ZESTORETIC) 20-25 MG per tablet 30 tablet 1     Sig: Take 1 tablet by mouth Daily.        Pharmacy where request should be sent: [id number] [place] [address] - [phone] [facility] [phone] FX     Last office visit with prescribing clinician: 11/5/2024   Last telemedicine visit with prescribing clinician: Visit date not found   Next office visit with prescribing clinician: 2/6/2025     Additional details provided by patient: COMPLETELY OUT FOR ONE WEEK.     Does the patient have less than a 3 day supply:  [x] Yes  [] No    Would you like a call back once the refill request has been completed: [] Yes [x] No    If the office needs to give you a call back, can they leave a voicemail: [] Yes [x] No    Gina Patel Rep   01/14/25 10:13 EST

## 2025-01-28 ENCOUNTER — OFFICE VISIT (OUTPATIENT)
Age: 50
End: 2025-01-28
Payer: COMMERCIAL

## 2025-01-28 VITALS
HEIGHT: 69 IN | SYSTOLIC BLOOD PRESSURE: 148 MMHG | BODY MASS INDEX: 32.44 KG/M2 | DIASTOLIC BLOOD PRESSURE: 94 MMHG | WEIGHT: 219 LBS

## 2025-01-28 DIAGNOSIS — I73.89 ACROCYANOSIS: ICD-10-CM

## 2025-01-28 DIAGNOSIS — G62.9 PERIPHERAL POLYNEUROPATHY: Primary | ICD-10-CM

## 2025-01-28 NOTE — PROGRESS NOTES
"Chief Complaint  new patient  (Peripheral arterial disease )    New patient referral for peripheral arterial disease    Subjective        Timo Hector presents to [facility]  History of Present Illness    Patient is a 49-year-old gentleman referred to be evaluated for peripheral arterial disease by Brenda ZIMMERMAN.     The patient reports years long history of worsening numbness in both of his lower legs that is slowly moving up his legs and now involving his hands as well.  He has been diagnosed with peripheral neuropathy by EMG at outside facility and is currently seeing  at [place] for this.     His significant other is a nurse and is concerned about the perfusion to his legs due to the cyanosis of his toes as well as the subjective coolness to palpation of his feet and lower legs.    Patient denies any significant pain with ambulation in his lower extremities but has neuropathy and so has numbness.       Objective   Vital Signs:  /94 (BP Location: Left arm)   Ht 175.3 cm (69.02\")   Wt 99.3 kg (219 lb)   BMI 32.33 kg/m²   Estimated body mass index is 32.33 kg/m² as calculated from the following:    Height as of this encounter: 175.3 cm (69.02\").    Weight as of this encounter: 99.3 kg (219 lb).                   Physical Exam   NAD  Respirations unlabored  RRR  Respirations unlabored  Palpable DP pulses bilaterally  Has some mild cyanosis of distal lower legs and feet with normal cap refill.  No large varicosities or telangiectasias noted.    Result Review :                                 Assessment and Plan     49-year-old gentleman referred due to concern for peripheral arterial disease.  He has strongly palpable DP pulses bilaterally and normal ABIs bilaterally.  Mild digital ischemia on ABIs noted.  I do not think the patient has any significant peripheral arterial disease to account for his lower extremity symptoms.  His exam to me is more " concerning for some mild venous reflux versus acrocyanosis.  I discussed with him that he would be unlikely to require any surgery for this.  Does not have any wounds or large varicosities.  Will have patient follow-up in 1 month with lower extremity venous reflux testing.  Will defer management of peripheral neuropathy and other lower extremity issues to his neurologist. He is currently on Cymbalta which he says helps a lot but can't go up on the dose due to drowsiness/lethargy.       Diagnoses and all orders for this visit:    1. Peripheral polyneuropathy (Primary)    2. Acrocyanosis  -     Venous w Reflux Lower Extremity - Bilateral CAR; Future              Patient BMI noted. Educational material for patient for health risks of being overweight added to patient's after visit summary.           Follow Up     Return in about 4 weeks (around 2/25/2025).  Patient was given instructions and counseling regarding his condition or for health maintenance advice. Please see specific information pulled into the AVS if appropriate.

## 2025-02-06 ENCOUNTER — OFFICE VISIT (OUTPATIENT)
Dept: FAMILY MEDICINE CLINIC | Facility: CLINIC | Age: 50
End: 2025-02-06
Payer: COMMERCIAL

## 2025-02-06 VITALS
SYSTOLIC BLOOD PRESSURE: 134 MMHG | RESPIRATION RATE: 18 BRPM | HEART RATE: 119 BPM | BODY MASS INDEX: 32.97 KG/M2 | WEIGHT: 222.6 LBS | OXYGEN SATURATION: 100 % | HEIGHT: 69 IN | DIASTOLIC BLOOD PRESSURE: 82 MMHG

## 2025-02-06 DIAGNOSIS — E03.9 ACQUIRED HYPOTHYROIDISM: ICD-10-CM

## 2025-02-06 DIAGNOSIS — Z00.00 PREVENTATIVE HEALTH CARE: Primary | ICD-10-CM

## 2025-02-06 DIAGNOSIS — G62.9 POLYNEUROPATHY: ICD-10-CM

## 2025-02-06 DIAGNOSIS — Z12.5 SCREENING FOR PROSTATE CANCER: ICD-10-CM

## 2025-02-06 DIAGNOSIS — I10 PRIMARY HYPERTENSION: ICD-10-CM

## 2025-02-06 PROCEDURE — 99396 PREV VISIT EST AGE 40-64: CPT | Performed by: NURSE PRACTITIONER

## 2025-02-06 RX ORDER — BACLOFEN 10 MG/1
10 TABLET ORAL 3 TIMES DAILY
COMMUNITY

## 2025-02-06 NOTE — PROGRESS NOTES
"Chief Complaint  Annual Exam  Subjective        Timo Hector presents to [facility]  History of Present Illness  Pt comes in today for routine physical.  Still with neuropathy issues. Has pain in both feet and hand  Has has seen neuro, podiatry, vascular.  Has follow up with neuro on 2/19.  Follow up with vascular on 3/4   Taking cymbalta  Has decreased circulation in lower ext. Ext cold and purple  He is in the process of applying for disability.        Objective     Vital Signs:   /82   Pulse 119   Resp 18   Ht 175.3 cm (69.02\")   Wt 101 kg (222 lb 9.6 oz)   SpO2 100%   BMI 32.86 kg/m²       BP Readings from Last 3 Encounters:   02/06/25 134/82   01/28/25 148/94   11/05/24 126/89       Wt Readings from Last 3 Encounters:   02/06/25 101 kg (222 lb 9.6 oz)   01/28/25 99.3 kg (219 lb)   12/31/24 99.8 kg (220 lb)     Physical Exam  Constitutional:       Appearance: He is well-developed.   HENT:      Head: Normocephalic.   Eyes:      Conjunctiva/sclera: Conjunctivae normal.      Pupils: Pupils are equal, round, and reactive to light.   Neck:      Thyroid: No thyromegaly.   Cardiovascular:      Rate and Rhythm: Normal rate and regular rhythm.      Heart sounds: No murmur heard.  Pulmonary:      Effort: Pulmonary effort is normal.      Breath sounds: Normal breath sounds.   Abdominal:      General: Bowel sounds are normal.      Palpations: Abdomen is soft.      Tenderness: There is no abdominal tenderness.   Musculoskeletal:         General: Normal range of motion.      Cervical back: Normal range of motion and neck supple.   Skin:     General: Skin is warm and dry.      Findings: No lesion.   Neurological:      Mental Status: He is alert and oriented to person, place, and time.   Psychiatric:         Behavior: Behavior normal.      Result Review :                 Assessment and Plan    Diagnoses and all orders for this visit:    1. Preventative health care (Primary)  - "     Comprehensive Metabolic Panel; Future  -     CBC & Differential; Future  -     Hemoglobin A1c; Future  -     Lipid Panel; Future  -     PSA Screen; Future  -     TSH; Future    2. Primary hypertension    3. Polyneuropathy    4. Acquired hypothyroidism    5. Screening for prostate cancer  -     PSA Screen; Future    Check labs  Follow up with neurology  Follow up with vascular  Discussed importance of regular exercise and recommended starting or continuing a regular exercise program for good health. The patient was also encouraged to lose weight for better health.   During this visit for their annual exam, we reviewed their personal history, social history and family history. We went over their medications and all the recommended health maintenance items for their age group. They were given the opportunity to ask questions and discuss other concerns.         Follow Up   Return in about 6 months (around 8/6/2025) for HTN follow up.  Patient was given instructions and counseling regarding his condition or for health maintenance advice. Please see specific information pulled into the AVS if appropriate.

## 2025-02-20 ENCOUNTER — TELEPHONE (OUTPATIENT)
Dept: FAMILY MEDICINE CLINIC | Facility: CLINIC | Age: 50
End: 2025-02-20
Payer: COMMERCIAL

## 2025-02-20 NOTE — TELEPHONE ENCOUNTER
Patient dropped off disability forms to have completed.   Patient states disability is regarding neuropathy in legs / hands.   Start date is 2011  Patient has been off work since 11/08/24.     Patient would like forms faxed once completed and he would also like to  a copy in office.     Fax # [phone]  Thank you!

## 2025-02-21 ENCOUNTER — LAB (OUTPATIENT)
Dept: FAMILY MEDICINE CLINIC | Facility: CLINIC | Age: 50
End: 2025-02-21
Payer: COMMERCIAL

## 2025-02-21 DIAGNOSIS — Z12.5 SCREENING FOR PROSTATE CANCER: ICD-10-CM

## 2025-02-21 DIAGNOSIS — Z00.00 PREVENTATIVE HEALTH CARE: ICD-10-CM

## 2025-02-21 LAB
ALBUMIN SERPL-MCNC: 4.2 G/DL (ref 3.5–5.2)
ALBUMIN/GLOB SERPL: 1.2 G/DL
ALP SERPL-CCNC: 50 U/L (ref 39–117)
ALT SERPL W P-5'-P-CCNC: 41 U/L (ref 1–41)
ANION GAP SERPL CALCULATED.3IONS-SCNC: 10.6 MMOL/L (ref 5–15)
AST SERPL-CCNC: 26 U/L (ref 1–40)
BASOPHILS # BLD AUTO: 0.02 10*3/MM3 (ref 0–0.2)
BASOPHILS NFR BLD AUTO: 0.3 % (ref 0–1.5)
BILIRUB SERPL-MCNC: 0.4 MG/DL (ref 0–1.2)
BUN SERPL-MCNC: 13 MG/DL (ref 6–20)
BUN/CREAT SERPL: 14.6 (ref 7–25)
CALCIUM SPEC-SCNC: 10.2 MG/DL (ref 8.6–10.5)
CHLORIDE SERPL-SCNC: 100 MMOL/L (ref 98–107)
CHOLEST SERPL-MCNC: 241 MG/DL (ref 0–200)
CO2 SERPL-SCNC: 26.4 MMOL/L (ref 22–29)
CREAT SERPL-MCNC: 0.89 MG/DL (ref 0.76–1.27)
DEPRECATED RDW RBC AUTO: 40.5 FL (ref 37–54)
EGFRCR SERPLBLD CKD-EPI 2021: 105.1 ML/MIN/1.73
EOSINOPHIL # BLD AUTO: 0.12 10*3/MM3 (ref 0–0.4)
EOSINOPHIL NFR BLD AUTO: 1.8 % (ref 0.3–6.2)
ERYTHROCYTE [DISTWIDTH] IN BLOOD BY AUTOMATED COUNT: 12.6 % (ref 12.3–15.4)
GLOBULIN UR ELPH-MCNC: 3.6 GM/DL
GLUCOSE SERPL-MCNC: 137 MG/DL (ref 65–99)
HBA1C MFR BLD: 6.5 % (ref 4.8–5.6)
HCT VFR BLD AUTO: 47.1 % (ref 37.5–51)
HDLC SERPL-MCNC: 29 MG/DL (ref 40–60)
HGB BLD-MCNC: 16.4 G/DL (ref 13–17.7)
IMM GRANULOCYTES # BLD AUTO: 0.07 10*3/MM3 (ref 0–0.05)
IMM GRANULOCYTES NFR BLD AUTO: 1 % (ref 0–0.5)
LDLC SERPL CALC-MCNC: 158 MG/DL (ref 0–100)
LDLC/HDLC SERPL: 5.32 {RATIO}
LYMPHOCYTES # BLD AUTO: 1.87 10*3/MM3 (ref 0.7–3.1)
LYMPHOCYTES NFR BLD AUTO: 27.5 % (ref 19.6–45.3)
MCH RBC QN AUTO: 31.2 PG (ref 26.6–33)
MCHC RBC AUTO-ENTMCNC: 34.8 G/DL (ref 31.5–35.7)
MCV RBC AUTO: 89.5 FL (ref 79–97)
MONOCYTES # BLD AUTO: 0.49 10*3/MM3 (ref 0.1–0.9)
MONOCYTES NFR BLD AUTO: 7.2 % (ref 5–12)
NEUTROPHILS NFR BLD AUTO: 4.23 10*3/MM3 (ref 1.7–7)
NEUTROPHILS NFR BLD AUTO: 62.2 % (ref 42.7–76)
NRBC BLD AUTO-RTO: 0 /100 WBC (ref 0–0.2)
PLATELET # BLD AUTO: 365 10*3/MM3 (ref 140–450)
PMV BLD AUTO: 9.8 FL (ref 6–12)
POTASSIUM SERPL-SCNC: 4.4 MMOL/L (ref 3.5–5.2)
PROT SERPL-MCNC: 7.8 G/DL (ref 6–8.5)
PSA SERPL-MCNC: 0.86 NG/ML (ref 0–4)
RBC # BLD AUTO: 5.26 10*6/MM3 (ref 4.14–5.8)
SODIUM SERPL-SCNC: 137 MMOL/L (ref 136–145)
TRIGL SERPL-MCNC: 289 MG/DL (ref 0–150)
TSH SERPL DL<=0.05 MIU/L-ACNC: 3.36 UIU/ML (ref 0.27–4.2)
VLDLC SERPL-MCNC: 54 MG/DL (ref 5–40)
WBC NRBC COR # BLD AUTO: 6.8 10*3/MM3 (ref 3.4–10.8)

## 2025-02-21 PROCEDURE — G0103 PSA SCREENING: HCPCS | Performed by: NURSE PRACTITIONER

## 2025-02-21 PROCEDURE — 80050 GENERAL HEALTH PANEL: CPT | Performed by: NURSE PRACTITIONER

## 2025-02-21 PROCEDURE — 80061 LIPID PANEL: CPT | Performed by: NURSE PRACTITIONER

## 2025-02-21 PROCEDURE — 36415 COLL VENOUS BLD VENIPUNCTURE: CPT

## 2025-02-21 PROCEDURE — 83036 HEMOGLOBIN GLYCOSYLATED A1C: CPT | Performed by: NURSE PRACTITIONER

## 2025-02-24 RX ORDER — METFORMIN HYDROCHLORIDE 500 MG/1
500 TABLET, EXTENDED RELEASE ORAL
Qty: 90 TABLET | Refills: 1 | Status: SHIPPED | OUTPATIENT
Start: 2025-02-24 | End: 2025-08-23

## 2025-02-24 RX ORDER — ROSUVASTATIN CALCIUM 10 MG/1
10 TABLET, COATED ORAL DAILY
Qty: 90 TABLET | Refills: 1 | Status: SHIPPED | OUTPATIENT
Start: 2025-02-24

## 2025-02-24 NOTE — PROGRESS NOTES
1. Lipids were elevated. TC was 241 (should be below 200), LDL was 158 (should be belo 100), HDL/good chol was low at 29. Trigs were elevated at 289 (should be below 150).   Puts him at higher risk for developing a cardiovascular event. I want him to start crestor to help lower risk.     The 10-year ASCVD risk score (Gail GOLDBERG, et al., 2019) is: 9.3%    Values used to calculate the score:      Age: 49 years      Sex: Male      Is Non- : No      Diabetic: No      Tobacco smoker: No      Systolic Blood Pressure: 134 mmHg      Is BP treated: Yes      HDL Cholesterol: 29 mg/dL      Total Cholesterol: 241 mg/dL  2. His A1C was elevated at 6.5. borderline diabetic. I want him to start metformin ER daily to help lower this. Also needs to work on diet. Avoid sweets.

## 2025-02-26 NOTE — TELEPHONE ENCOUNTER
Note placed on form from provider that we are unable to complete forms here and would need to have completed by occupational medicine.     I informed patient and gave him address and phone numbers for two different [facility] med locations.     Thank you.

## 2025-03-04 ENCOUNTER — OFFICE VISIT (OUTPATIENT)
Age: 50
End: 2025-03-04
Payer: COMMERCIAL

## 2025-03-04 ENCOUNTER — HOSPITAL ENCOUNTER (OUTPATIENT)
Dept: CARDIOLOGY | Facility: HOSPITAL | Age: 50
Discharge: HOME | End: 2025-03-04
Admitting: STUDENT IN AN ORGANIZED HEALTH CARE EDUCATION/TRAINING PROGRAM
Payer: COMMERCIAL

## 2025-03-04 VITALS
HEIGHT: 69 IN | WEIGHT: 222 LBS | DIASTOLIC BLOOD PRESSURE: 92 MMHG | SYSTOLIC BLOOD PRESSURE: 142 MMHG | BODY MASS INDEX: 32.88 KG/M2

## 2025-03-04 DIAGNOSIS — I73.89 ACROCYANOSIS: ICD-10-CM

## 2025-03-04 DIAGNOSIS — G62.9 PERIPHERAL POLYNEUROPATHY: Primary | ICD-10-CM

## 2025-03-04 DIAGNOSIS — R20.2 PARESTHESIA: ICD-10-CM

## 2025-03-04 DIAGNOSIS — R21 RASH: ICD-10-CM

## 2025-03-04 LAB
BH CV LEFT LOWER VAS GSV KNEE TRANSVERSE DIAMETER: 0.4 CM
BH CV LEFT LOWER VAS GSV MID REFLUX TIME: 1.3 SEC
BH CV LEFT LOWER VAS GSV MID TRANSVERSE DIAMETER: 0.3 CM
BH CV LEFT LOWER VAS GSV PROX TRANSVERSE DIAMETER: 0.4 CM
BH CV LEFT LOWER VAS GSVBELOW KNEE TRANSVERSE DIAMETER: 0.3 CM
BH CV LEFT LOWER VAS MID FEMORAL REFLUX TIME: 0.45 SEC
BH CV LEFT LOWER VAS POPLITEAL REFLUX TIME: 0.98 SEC
BH CV LEFT LOWER VAS SAPHENOFEMORAL JUNCTION TRANSVERSE DIAMETER: 0.7 CM
BH CV LEFT LOWER VAS SSV MID CALF TRANS DIAMETER: 0.2 CM
BH CV LEFT LOWER VAS SSV PROX CALF TRANS DIAMETER: 0.2 CM
BH CV LOWER VAS LEFT GSV DIST THIGH COMPRESSIBILTY: NORMAL
BH CV LOWER VAS LEFT GSV MID CALF COMPRESSIBILTY: NORMAL
BH CV LOWER VAS LEFT GSV MID THIGH COMPRESSIBILTY: NORMAL
BH CV LOWER VAS RIGHT GSV DIST THIGH COMPRESSIBILTY: NORMAL
BH CV LOWER VAS RIGHT GSV MID CALF COMPRESSIBILTY: NORMAL
BH CV LOWER VAS RIGHT GSV MID THIGH COMPRESSIBILTY: NORMAL
BH CV LOWER VASCULAR LEFT COMMON FEMORAL AUGMENT: NORMAL
BH CV LOWER VASCULAR LEFT COMMON FEMORAL COMPETENT: NORMAL
BH CV LOWER VASCULAR LEFT COMMON FEMORAL COMPRESS: NORMAL
BH CV LOWER VASCULAR LEFT COMMON FEMORAL PHASIC: NORMAL
BH CV LOWER VASCULAR LEFT COMMON FEMORAL SPONT: NORMAL
BH CV LOWER VASCULAR LEFT DISTAL FEMORAL COMPRESS: NORMAL
BH CV LOWER VASCULAR LEFT GREATER SAPH AK COMPETENT: NORMAL
BH CV LOWER VASCULAR LEFT GREATER SAPH AK COMPRESS: NORMAL
BH CV LOWER VASCULAR LEFT GREATER SAPH BK COMPETENT: NORMAL
BH CV LOWER VASCULAR LEFT GREATER SAPH BK COMPRESS: NORMAL
BH CV LOWER VASCULAR LEFT GSV DIST THIGH COMPETENT: NORMAL
BH CV LOWER VASCULAR LEFT GSV MID CALF COMPETENT: NORMAL
BH CV LOWER VASCULAR LEFT GSV MID THIGH COMPETENT: NORMAL
BH CV LOWER VASCULAR LEFT LESSER SAPH COMPETENT: NORMAL
BH CV LOWER VASCULAR LEFT LESSER SAPH COMPRESS: NORMAL
BH CV LOWER VASCULAR LEFT MID FEMORAL AUGMENT: NORMAL
BH CV LOWER VASCULAR LEFT MID FEMORAL COMPETENT: NORMAL
BH CV LOWER VASCULAR LEFT MID FEMORAL COMPRESS: NORMAL
BH CV LOWER VASCULAR LEFT MID FEMORAL PHASIC: NORMAL
BH CV LOWER VASCULAR LEFT MID FEMORAL SPONT: NORMAL
BH CV LOWER VASCULAR LEFT POPLITEAL AUGMENT: NORMAL
BH CV LOWER VASCULAR LEFT POPLITEAL COMPETENT: NORMAL
BH CV LOWER VASCULAR LEFT POPLITEAL COMPRESS: NORMAL
BH CV LOWER VASCULAR LEFT POPLITEAL PHASIC: NORMAL
BH CV LOWER VASCULAR LEFT POPLITEAL SPONT: NORMAL
BH CV LOWER VASCULAR LEFT PROXIMAL FEMORAL COMPRESS: NORMAL
BH CV LOWER VASCULAR LEFT SAPHENOFEMORAL JUNCTION AUGMENT: NORMAL
BH CV LOWER VASCULAR LEFT SAPHENOFEMORAL JUNCTION COMPETENT: NORMAL
BH CV LOWER VASCULAR LEFT SAPHENOFEMORAL JUNCTION COMPRESS: NORMAL
BH CV LOWER VASCULAR LEFT SAPHENOFEMORAL JUNCTION PHASIC: NORMAL
BH CV LOWER VASCULAR LEFT SAPHENOFEMORAL JUNCTION SPONT: NORMAL
BH CV LOWER VASCULAR LEFT SAPHENOPOP JX AUGMENT: NORMAL
BH CV LOWER VASCULAR LEFT SAPHENOPOP JX COMPETENT: NORMAL
BH CV LOWER VASCULAR LEFT SAPHENOPOP JX COMPRESS: NORMAL
BH CV LOWER VASCULAR LEFT SAPHENOPOP JX PHASIC: NORMAL
BH CV LOWER VASCULAR LEFT SAPHENOPOP JX SPONT: NORMAL
BH CV LOWER VASCULAR LEFT SSV MID CALF COMPETENT: NORMAL
BH CV LOWER VASCULAR LEFT SSV MID CALF COMPRESS: NORMAL
BH CV LOWER VASCULAR RIGHT COMMON FEMORAL AUGMENT: NORMAL
BH CV LOWER VASCULAR RIGHT COMMON FEMORAL COMPETENT: NORMAL
BH CV LOWER VASCULAR RIGHT COMMON FEMORAL COMPRESS: NORMAL
BH CV LOWER VASCULAR RIGHT COMMON FEMORAL PHASIC: NORMAL
BH CV LOWER VASCULAR RIGHT COMMON FEMORAL SPONT: NORMAL
BH CV LOWER VASCULAR RIGHT DISTAL FEMORAL COMPRESS: NORMAL
BH CV LOWER VASCULAR RIGHT GREATER SAPH AK COMPETENT: NORMAL
BH CV LOWER VASCULAR RIGHT GREATER SAPH BK COMPETENT: NORMAL
BH CV LOWER VASCULAR RIGHT GREATER SAPH BK COMPRESS: NORMAL
BH CV LOWER VASCULAR RIGHT GSV DIST THIGH COMPETENT: NORMAL
BH CV LOWER VASCULAR RIGHT GSV MID CALF COMPETENT: NORMAL
BH CV LOWER VASCULAR RIGHT GSV MID THIGH COMPETENT: NORMAL
BH CV LOWER VASCULAR RIGHT LESSER SAPH COMPETENT: NORMAL
BH CV LOWER VASCULAR RIGHT LESSER SAPH COMPRESS: NORMAL
BH CV LOWER VASCULAR RIGHT MID FEMORAL AUGMENT: NORMAL
BH CV LOWER VASCULAR RIGHT MID FEMORAL COMPETENT: NORMAL
BH CV LOWER VASCULAR RIGHT MID FEMORAL COMPRESS: NORMAL
BH CV LOWER VASCULAR RIGHT MID FEMORAL PHASIC: NORMAL
BH CV LOWER VASCULAR RIGHT MID FEMORAL SPONT: NORMAL
BH CV LOWER VASCULAR RIGHT POPLITEAL AUGMENT: NORMAL
BH CV LOWER VASCULAR RIGHT POPLITEAL COMPETENT: NORMAL
BH CV LOWER VASCULAR RIGHT POPLITEAL COMPRESS: NORMAL
BH CV LOWER VASCULAR RIGHT POPLITEAL PHASIC: NORMAL
BH CV LOWER VASCULAR RIGHT POPLITEAL SPONT: NORMAL
BH CV LOWER VASCULAR RIGHT PROXIMAL FEMORAL COMPRESS: NORMAL
BH CV LOWER VASCULAR RIGHT SAPHENOFEMORAL JUNCTION AUGMENT: NORMAL
BH CV LOWER VASCULAR RIGHT SAPHENOFEMORAL JUNCTION COMPETENT: NORMAL
BH CV LOWER VASCULAR RIGHT SAPHENOFEMORAL JUNCTION COMPRESS: NORMAL
BH CV LOWER VASCULAR RIGHT SAPHENOFEMORAL JUNCTION PHASIC: NORMAL
BH CV LOWER VASCULAR RIGHT SAPHENOFEMORAL JUNCTION SPONT: NORMAL
BH CV LOWER VASCULAR RIGHT SAPHENOPOP JX AUGMENT: NORMAL
BH CV LOWER VASCULAR RIGHT SAPHENOPOP JX COMPETENT: NORMAL
BH CV LOWER VASCULAR RIGHT SAPHENOPOP JX COMPRESS: NORMAL
BH CV LOWER VASCULAR RIGHT SAPHENOPOP JX PHASIC: NORMAL
BH CV LOWER VASCULAR RIGHT SAPHENOPOP JX SPONT: NORMAL
BH CV LOWER VASCULAR RIGHT SSV MID CALF COMPETENT: NORMAL
BH CV LOWER VASCULAR RIGHT SSV MID CALF COMPRESS: NORMAL
BH CV RIGHT LOWER VAS GSV KNEE REFLUX TIME: 0.31 SEC
BH CV RIGHT LOWER VAS GSV KNEE TRANS DIAMETER: 0.4 CM
BH CV RIGHT LOWER VAS GSV MID THIGH TRANS DIAMETER: 0.4 CM
BH CV RIGHT LOWER VAS GSV PROX CALF REFLUX TIME: 0.43 SEC
BH CV RIGHT LOWER VAS GSV PROX CALF TRANS DIAMETER: 0.4 CM
BH CV RIGHT LOWER VAS GSV PROX THIGH TRANS DIAMETER: 0.6 CM
BH CV RIGHT LOWER VAS SAPHENOFEM JUNCTION TRANSVERSE DIAMETER: 0.6 CM
BH CV RIGHT LOWER VAS SSV MID CALF TRANS DIAMETER: 0.1 CM
BH CV RIGHT LOWER VAS SSV PROX CALF TRANS DIAMETER: 0.2 CM
BH CV VAS RIGHT GSV PROXIMAL HIDDEN LRR COMPRESSIBILTY: NORMAL

## 2025-03-04 PROCEDURE — 93970 EXTREMITY STUDY: CPT

## 2025-03-04 PROCEDURE — 99213 OFFICE O/P EST LOW 20 MIN: CPT | Performed by: STUDENT IN AN ORGANIZED HEALTH CARE EDUCATION/TRAINING PROGRAM

## 2025-03-04 NOTE — PROGRESS NOTES
"Chief Complaint  Peripheral polyneuropathy    Follow up for peripheral arterial disease/neuropathy    Subjective        Timo Hector presents to [facility]  History of Present Illness    Patient is a 49-year-old gentleman referred to be evaluated for peripheral arterial disease by Brenda ZIMMERMAN.     Patient has peripheral neuropathy for which she follows with neurosurgery.  He had been referred to us for discoloration of his feet.  Previous ABIs were normal with some toe pressures that were reduced.  Palpable distal pulses bilaterally.  He is here today for routine follow-up with lower extremity venous reflux study    Patient has had no change in his neuropathy symptoms since last seen here. He saw neurology recently who referred him to [facility] for possible carpal tunnel syndrome and a neuromuscular specialists.   The patient's wife is worried there could be an autoimmune component to his neuropathy bc he has recently developed a rash on his face and head.       Objective   Vital Signs:  /92 (BP Location: Left arm)   Ht 175.3 cm (69.02\")   Wt 101 kg (222 lb)   BMI 32.77 kg/m²   Estimated body mass index is 32.77 kg/m² as calculated from the following:    Height as of this encounter: 175.3 cm (69.02\").    Weight as of this encounter: 101 kg (222 lb).                   Physical Exam   NAD  Respirations unlabored  RRR  Respirations unlabored  Palpable DP pulses bilaterally  Has some mild cyanosis of distal lower legs and feet with normal cap refill.  No large varicosities or telangiectasias noted.    Result Review :                                     Assessment and Plan     49-year-old gentleman referred due to concern for peripheral arterial disease.  He has strongly palpable DP pulses bilaterally and normal ABIs bilaterally.  Mild digital ischemia on ABIs noted.  I do not think the patient has any significant peripheral arterial disease to account for his " lower extremity symptoms as he has normal ABIs and palpable pulses in his feet.   He is here today for follow up with venous duplex study.   This shows some mild reflux in the left GSV at the thigh, vein is 3mm in size at this level. I don't think this alone is enough to account for the patients numerous symptoms.   I encouraged him to continue wearing compression socks and follow up with neurology.   I did place a referral to Rheumatology to see if there could be an autoimmune component to the patients otherwise idiopathic peripheral neuropathy, per patient and wives request.   Will see patient again as needed.       Diagnoses and all orders for this visit:    1. Peripheral polyneuropathy (Primary)  -     Ambulatory Referral to Rheumatology    2. Paresthesia  -     Ambulatory Referral to Rheumatology    3. Rash  -     Ambulatory Referral to Rheumatology            Patient BMI noted. Educational material for patient for health risks of being overweight added to patient's after visit summary.           Follow Up     No follow-ups on file.  Patient was given instructions and counseling regarding his condition or for health maintenance advice. Please see specific information pulled into the AVS if appropriate.

## 2025-03-12 RX ORDER — LISINOPRIL AND HYDROCHLOROTHIAZIDE 20; 25 MG/1; MG/1
1 TABLET ORAL DAILY
Qty: 30 TABLET | Refills: 1 | Status: SHIPPED | OUTPATIENT
Start: 2025-03-12

## 2025-03-19 ENCOUNTER — APPOINTMENT (OUTPATIENT)
Dept: GENERAL RADIOLOGY | Facility: HOSPITAL | Age: 50
End: 2025-03-19
Payer: COMMERCIAL

## 2025-03-19 ENCOUNTER — APPOINTMENT (OUTPATIENT)
Dept: CT IMAGING | Facility: HOSPITAL | Age: 50
End: 2025-03-19
Payer: COMMERCIAL

## 2025-03-19 ENCOUNTER — HOSPITAL ENCOUNTER (OUTPATIENT)
Facility: HOSPITAL | Age: 50
Setting detail: OBSERVATION
Discharge: HOME | End: 2025-03-20
Attending: EMERGENCY MEDICINE | Admitting: EMERGENCY MEDICINE
Payer: COMMERCIAL

## 2025-03-19 DIAGNOSIS — R55 SYNCOPE AND COLLAPSE: ICD-10-CM

## 2025-03-19 DIAGNOSIS — R00.2 PALPITATIONS: Primary | ICD-10-CM

## 2025-03-19 DIAGNOSIS — R55 SYNCOPE, UNSPECIFIED SYNCOPE TYPE: ICD-10-CM

## 2025-03-19 DIAGNOSIS — R07.89 CHEST TIGHTNESS: ICD-10-CM

## 2025-03-19 LAB
ALBUMIN SERPL-MCNC: 4.6 G/DL (ref 3.5–5.2)
ALBUMIN/GLOB SERPL: 1.7 G/DL
ALP SERPL-CCNC: 55 U/L (ref 39–117)
ALT SERPL W P-5'-P-CCNC: 34 U/L (ref 1–41)
ANION GAP SERPL CALCULATED.3IONS-SCNC: 16.9 MMOL/L (ref 5–15)
AST SERPL-CCNC: 28 U/L (ref 1–40)
BASOPHILS # BLD AUTO: 0.02 10*3/MM3 (ref 0–0.2)
BASOPHILS NFR BLD AUTO: 0.1 % (ref 0–1.5)
BILIRUB SERPL-MCNC: 0.4 MG/DL (ref 0–1.2)
BUN SERPL-MCNC: 17 MG/DL (ref 6–20)
BUN/CREAT SERPL: 16.7 (ref 7–25)
CALCIUM SPEC-SCNC: 9.8 MG/DL (ref 8.6–10.5)
CHLORIDE SERPL-SCNC: 100 MMOL/L (ref 98–107)
CO2 SERPL-SCNC: 23.1 MMOL/L (ref 22–29)
CREAT SERPL-MCNC: 1.02 MG/DL (ref 0.76–1.27)
D DIMER PPP FEU-MCNC: 1 MCGFEU/ML (ref 0–0.5)
DEPRECATED RDW RBC AUTO: 40.4 FL (ref 37–54)
EGFRCR SERPLBLD CKD-EPI 2021: 90.1 ML/MIN/1.73
EOSINOPHIL # BLD AUTO: 0.04 10*3/MM3 (ref 0–0.4)
EOSINOPHIL NFR BLD AUTO: 0.2 % (ref 0.3–6.2)
ERYTHROCYTE [DISTWIDTH] IN BLOOD BY AUTOMATED COUNT: 12.5 % (ref 12.3–15.4)
ERYTHROCYTE [SEDIMENTATION RATE] IN BLOOD: 11 MM/HR (ref 0–15)
GEN 5 1HR TROPONIN T REFLEX: 26 NG/L
GLOBULIN UR ELPH-MCNC: 2.7 GM/DL
GLUCOSE BLDC GLUCOMTR-MCNC: 136 MG/DL (ref 70–105)
GLUCOSE SERPL-MCNC: 202 MG/DL (ref 65–99)
HCT VFR BLD AUTO: 43.4 % (ref 37.5–51)
HGB BLD-MCNC: 14.8 G/DL (ref 13–17.7)
HOLD SPECIMEN: NORMAL
IMM GRANULOCYTES # BLD AUTO: 0.19 10*3/MM3 (ref 0–0.05)
IMM GRANULOCYTES NFR BLD AUTO: 1 % (ref 0–0.5)
LYMPHOCYTES # BLD AUTO: 1.58 10*3/MM3 (ref 0.7–3.1)
LYMPHOCYTES NFR BLD AUTO: 8.4 % (ref 19.6–45.3)
MAGNESIUM SERPL-MCNC: 1.9 MG/DL (ref 1.6–2.6)
MCH RBC QN AUTO: 30.1 PG (ref 26.6–33)
MCHC RBC AUTO-ENTMCNC: 34.1 G/DL (ref 31.5–35.7)
MCV RBC AUTO: 88.2 FL (ref 79–97)
MONOCYTES # BLD AUTO: 1.14 10*3/MM3 (ref 0.1–0.9)
MONOCYTES NFR BLD AUTO: 6.1 % (ref 5–12)
NEUTROPHILS NFR BLD AUTO: 15.79 10*3/MM3 (ref 1.7–7)
NEUTROPHILS NFR BLD AUTO: 84.2 % (ref 42.7–76)
NRBC BLD AUTO-RTO: 0 /100 WBC (ref 0–0.2)
PLATELET # BLD AUTO: 342 10*3/MM3 (ref 140–450)
PMV BLD AUTO: 9.1 FL (ref 6–12)
POTASSIUM SERPL-SCNC: 3.9 MMOL/L (ref 3.5–5.2)
PROT SERPL-MCNC: 7.3 G/DL (ref 6–8.5)
RBC # BLD AUTO: 4.92 10*6/MM3 (ref 4.14–5.8)
SODIUM SERPL-SCNC: 140 MMOL/L (ref 136–145)
TROPONIN T NUMERIC DELTA: 7 NG/L
TROPONIN T SERPL HS-MCNC: 19 NG/L
TSH SERPL DL<=0.05 MIU/L-ACNC: 3.35 UIU/ML (ref 0.27–4.2)
WBC NRBC COR # BLD AUTO: 18.76 10*3/MM3 (ref 3.4–10.8)

## 2025-03-19 PROCEDURE — 25510000001 IOPAMIDOL PER 1 ML: Performed by: EMERGENCY MEDICINE

## 2025-03-19 PROCEDURE — 99285 EMERGENCY DEPT VISIT HI MDM: CPT

## 2025-03-19 PROCEDURE — 25010000002 ONDANSETRON PER 1 MG: Performed by: EMERGENCY MEDICINE

## 2025-03-19 PROCEDURE — G0378 HOSPITAL OBSERVATION PER HR: HCPCS

## 2025-03-19 PROCEDURE — 25010000002 MORPHINE PER 10 MG: Performed by: EMERGENCY MEDICINE

## 2025-03-19 PROCEDURE — 85652 RBC SED RATE AUTOMATED: CPT | Performed by: EMERGENCY MEDICINE

## 2025-03-19 PROCEDURE — 82948 REAGENT STRIP/BLOOD GLUCOSE: CPT | Performed by: EMERGENCY MEDICINE

## 2025-03-19 PROCEDURE — 72220 X-RAY EXAM SACRUM TAILBONE: CPT

## 2025-03-19 PROCEDURE — 36415 COLL VENOUS BLD VENIPUNCTURE: CPT

## 2025-03-19 PROCEDURE — 71275 CT ANGIOGRAPHY CHEST: CPT

## 2025-03-19 PROCEDURE — 96361 HYDRATE IV INFUSION ADD-ON: CPT

## 2025-03-19 PROCEDURE — 85379 FIBRIN DEGRADATION QUANT: CPT | Performed by: EMERGENCY MEDICINE

## 2025-03-19 PROCEDURE — 84484 ASSAY OF TROPONIN QUANT: CPT | Performed by: EMERGENCY MEDICINE

## 2025-03-19 PROCEDURE — 25010000002 ENOXAPARIN PER 10 MG: Performed by: EMERGENCY MEDICINE

## 2025-03-19 PROCEDURE — 83735 ASSAY OF MAGNESIUM: CPT | Performed by: EMERGENCY MEDICINE

## 2025-03-19 PROCEDURE — 25810000003 SODIUM CHLORIDE 0.9 % SOLUTION: Performed by: EMERGENCY MEDICINE

## 2025-03-19 PROCEDURE — 80050 GENERAL HEALTH PANEL: CPT | Performed by: EMERGENCY MEDICINE

## 2025-03-19 PROCEDURE — 96372 THER/PROPH/DIAG INJ SC/IM: CPT

## 2025-03-19 PROCEDURE — 96374 THER/PROPH/DIAG INJ IV PUSH: CPT

## 2025-03-19 PROCEDURE — 93005 ELECTROCARDIOGRAM TRACING: CPT | Performed by: EMERGENCY MEDICINE

## 2025-03-19 PROCEDURE — 71045 X-RAY EXAM CHEST 1 VIEW: CPT

## 2025-03-19 PROCEDURE — 96375 TX/PRO/DX INJ NEW DRUG ADDON: CPT

## 2025-03-19 PROCEDURE — 25810000003 LACTATED RINGERS SOLUTION: Performed by: EMERGENCY MEDICINE

## 2025-03-19 RX ORDER — SODIUM CHLORIDE 0.9 % (FLUSH) 0.9 %
10 SYRINGE (ML) INJECTION EVERY 12 HOURS SCHEDULED
Status: DISCONTINUED | OUTPATIENT
Start: 2025-03-19 | End: 2025-03-20 | Stop reason: HOSPADM

## 2025-03-19 RX ORDER — POLYETHYLENE GLYCOL 3350 17 G/17G
17 POWDER, FOR SOLUTION ORAL DAILY PRN
Status: DISCONTINUED | OUTPATIENT
Start: 2025-03-19 | End: 2025-03-20 | Stop reason: HOSPADM

## 2025-03-19 RX ORDER — ENOXAPARIN SODIUM 100 MG/ML
1 INJECTION SUBCUTANEOUS ONCE
Status: COMPLETED | OUTPATIENT
Start: 2025-03-19 | End: 2025-03-19

## 2025-03-19 RX ORDER — BISACODYL 10 MG
10 SUPPOSITORY, RECTAL RECTAL DAILY PRN
Status: DISCONTINUED | OUTPATIENT
Start: 2025-03-19 | End: 2025-03-20 | Stop reason: HOSPADM

## 2025-03-19 RX ORDER — NITROGLYCERIN 0.4 MG/1
0.4 TABLET SUBLINGUAL
Status: DISCONTINUED | OUTPATIENT
Start: 2025-03-19 | End: 2025-03-19 | Stop reason: SDUPTHER

## 2025-03-19 RX ORDER — SODIUM CHLORIDE 0.9 % (FLUSH) 0.9 %
10 SYRINGE (ML) INJECTION AS NEEDED
Status: DISCONTINUED | OUTPATIENT
Start: 2025-03-19 | End: 2025-03-20 | Stop reason: HOSPADM

## 2025-03-19 RX ORDER — ASPIRIN 325 MG
325 TABLET ORAL ONCE
Status: COMPLETED | OUTPATIENT
Start: 2025-03-19 | End: 2025-03-19

## 2025-03-19 RX ORDER — SODIUM CHLORIDE 9 MG/ML
40 INJECTION, SOLUTION INTRAVENOUS AS NEEDED
Status: DISCONTINUED | OUTPATIENT
Start: 2025-03-19 | End: 2025-03-20 | Stop reason: HOSPADM

## 2025-03-19 RX ORDER — BISACODYL 5 MG/1
5 TABLET, DELAYED RELEASE ORAL DAILY PRN
Status: DISCONTINUED | OUTPATIENT
Start: 2025-03-19 | End: 2025-03-20 | Stop reason: HOSPADM

## 2025-03-19 RX ORDER — ONDANSETRON 2 MG/ML
4 INJECTION INTRAMUSCULAR; INTRAVENOUS ONCE
Status: COMPLETED | OUTPATIENT
Start: 2025-03-19 | End: 2025-03-19

## 2025-03-19 RX ORDER — NITROGLYCERIN 0.4 MG/1
0.4 TABLET SUBLINGUAL
Status: DISCONTINUED | OUTPATIENT
Start: 2025-03-19 | End: 2025-03-20 | Stop reason: HOSPADM

## 2025-03-19 RX ORDER — AMOXICILLIN 250 MG
2 CAPSULE ORAL 2 TIMES DAILY
Status: DISCONTINUED | OUTPATIENT
Start: 2025-03-19 | End: 2025-03-20 | Stop reason: HOSPADM

## 2025-03-19 RX ORDER — NALOXONE HCL 0.4 MG/ML
0.4 VIAL (ML) INJECTION
Status: DISCONTINUED | OUTPATIENT
Start: 2025-03-19 | End: 2025-03-20 | Stop reason: HOSPADM

## 2025-03-19 RX ORDER — SODIUM CHLORIDE 9 MG/ML
100 INJECTION, SOLUTION INTRAVENOUS CONTINUOUS
Status: DISCONTINUED | OUTPATIENT
Start: 2025-03-19 | End: 2025-03-20 | Stop reason: HOSPADM

## 2025-03-19 RX ORDER — ONDANSETRON 2 MG/ML
4 INJECTION INTRAMUSCULAR; INTRAVENOUS EVERY 6 HOURS PRN
Status: DISCONTINUED | OUTPATIENT
Start: 2025-03-19 | End: 2025-03-20

## 2025-03-19 RX ORDER — MORPHINE SULFATE 2 MG/ML
1 INJECTION, SOLUTION INTRAMUSCULAR; INTRAVENOUS EVERY 4 HOURS PRN
Status: DISCONTINUED | OUTPATIENT
Start: 2025-03-19 | End: 2025-03-20 | Stop reason: HOSPADM

## 2025-03-19 RX ORDER — IOPAMIDOL 755 MG/ML
100 INJECTION, SOLUTION INTRAVASCULAR
Status: COMPLETED | OUTPATIENT
Start: 2025-03-19 | End: 2025-03-19

## 2025-03-19 RX ADMIN — SODIUM CHLORIDE 100 ML/HR: 9 INJECTION, SOLUTION INTRAVENOUS at 22:05

## 2025-03-19 RX ADMIN — Medication 10 ML: at 22:16

## 2025-03-19 RX ADMIN — IOPAMIDOL 100 ML: 755 INJECTION, SOLUTION INTRAVENOUS at 20:34

## 2025-03-19 RX ADMIN — ENOXAPARIN SODIUM 100 MG: 100 INJECTION SUBCUTANEOUS at 22:05

## 2025-03-19 RX ADMIN — ASPIRIN 325 MG ORAL TABLET 325 MG: 325 PILL ORAL at 19:16

## 2025-03-19 RX ADMIN — MORPHINE SULFATE 4 MG: 4 INJECTION, SOLUTION INTRAMUSCULAR; INTRAVENOUS at 19:17

## 2025-03-19 RX ADMIN — ONDANSETRON 4 MG: 2 INJECTION, SOLUTION INTRAMUSCULAR; INTRAVENOUS at 19:16

## 2025-03-19 RX ADMIN — SODIUM CHLORIDE, SODIUM LACTATE, POTASSIUM CHLORIDE, CALCIUM CHLORIDE 500 ML: 20; 30; 600; 310 INJECTION, SOLUTION INTRAVENOUS at 19:24

## 2025-03-20 ENCOUNTER — APPOINTMENT (OUTPATIENT)
Dept: CARDIOLOGY | Facility: HOSPITAL | Age: 50
End: 2025-03-20
Payer: COMMERCIAL

## 2025-03-20 ENCOUNTER — APPOINTMENT (OUTPATIENT)
Dept: NUCLEAR MEDICINE | Facility: HOSPITAL | Age: 50
End: 2025-03-20
Payer: COMMERCIAL

## 2025-03-20 ENCOUNTER — APPOINTMENT (OUTPATIENT)
Dept: RESPIRATORY THERAPY | Facility: HOSPITAL | Age: 50
End: 2025-03-20
Payer: COMMERCIAL

## 2025-03-20 VITALS
SYSTOLIC BLOOD PRESSURE: 124 MMHG | OXYGEN SATURATION: 97 % | HEART RATE: 86 BPM | TEMPERATURE: 98.2 F | WEIGHT: 220 LBS | BODY MASS INDEX: 33.34 KG/M2 | HEIGHT: 68 IN | RESPIRATION RATE: 17 BRPM | DIASTOLIC BLOOD PRESSURE: 79 MMHG

## 2025-03-20 LAB
ANION GAP SERPL CALCULATED.3IONS-SCNC: 11.9 MMOL/L (ref 5–15)
AORTIC DIMENSIONLESS INDEX: 0.85 (DI)
AV MEAN PRESS GRAD SYS DOP V1V2: 5 MMHG
AV VMAX SYS DOP: 146 CM/SEC
BASOPHILS # BLD AUTO: 0.02 10*3/MM3 (ref 0–0.2)
BASOPHILS NFR BLD AUTO: 0.2 % (ref 0–1.5)
BH CV ECHO MEAS - ACS: 1.9 CM
BH CV ECHO MEAS - AO MAX PG: 8.5 MMHG
BH CV ECHO MEAS - AO V2 VTI: 26.5 CM
BH CV ECHO MEAS - AVA(I,D): 2.17 CM2
BH CV ECHO MEAS - EDV(CUBED): 59.3 ML
BH CV ECHO MEAS - EDV(MOD-SP4): 85.5 ML
BH CV ECHO MEAS - EF(MOD-SP4): 56.7 %
BH CV ECHO MEAS - ESV(CUBED): 27 ML
BH CV ECHO MEAS - ESV(MOD-SP4): 37 ML
BH CV ECHO MEAS - FS: 23.1 %
BH CV ECHO MEAS - IVS/LVPW: 1.1 CM
BH CV ECHO MEAS - IVSD: 1.1 CM
BH CV ECHO MEAS - LA DIMENSION: 4 CM
BH CV ECHO MEAS - LAT PEAK E' VEL: 12.7 CM/SEC
BH CV ECHO MEAS - LV MASS(C)D: 131 GRAMS
BH CV ECHO MEAS - LV MAX PG: 6.7 MMHG
BH CV ECHO MEAS - LV MEAN PG: 3 MMHG
BH CV ECHO MEAS - LV V1 MAX: 129 CM/SEC
BH CV ECHO MEAS - LV V1 VTI: 22.6 CM
BH CV ECHO MEAS - LVIDD: 3.9 CM
BH CV ECHO MEAS - LVIDS: 3 CM
BH CV ECHO MEAS - LVOT AREA: 2.5 CM2
BH CV ECHO MEAS - LVOT DIAM: 1.8 CM
BH CV ECHO MEAS - LVPWD: 1 CM
BH CV ECHO MEAS - MED PEAK E' VEL: 14.4 CM/SEC
BH CV ECHO MEAS - MV A MAX VEL: 110 CM/SEC
BH CV ECHO MEAS - MV DEC SLOPE: 539.5 CM/SEC2
BH CV ECHO MEAS - MV DEC TIME: 0.24 SEC
BH CV ECHO MEAS - MV E MAX VEL: 106 CM/SEC
BH CV ECHO MEAS - MV E/A: 0.96
BH CV ECHO MEAS - MV MAX PG: 6.9 MMHG
BH CV ECHO MEAS - MV MEAN PG: 4 MMHG
BH CV ECHO MEAS - MV P1/2T: 81.4 MSEC
BH CV ECHO MEAS - MV V2 VTI: 30.5 CM
BH CV ECHO MEAS - MVA(P1/2T): 2.7 CM2
BH CV ECHO MEAS - MVA(VTI): 1.89 CM2
BH CV ECHO MEAS - PA V2 MAX: 124 CM/SEC
BH CV ECHO MEAS - PULM A REVS DUR: 0.09 SEC
BH CV ECHO MEAS - PULM A REVS VEL: 31.6 CM/SEC
BH CV ECHO MEAS - PULM DIAS VEL: 43.7 CM/SEC
BH CV ECHO MEAS - PULM S/D: 1.57
BH CV ECHO MEAS - PULM SYS VEL: 68.8 CM/SEC
BH CV ECHO MEAS - QP/QS: 0.69
BH CV ECHO MEAS - RV MAX PG: 3 MMHG
BH CV ECHO MEAS - RV V1 MAX: 87 CM/SEC
BH CV ECHO MEAS - RV V1 VTI: 17.5 CM
BH CV ECHO MEAS - RVDD: 2.8 CM
BH CV ECHO MEAS - RVOT DIAM: 1.7 CM
BH CV ECHO MEAS - SV(LVOT): 57.5 ML
BH CV ECHO MEAS - SV(MOD-SP4): 48.5 ML
BH CV ECHO MEAS - SV(RVOT): 39.7 ML
BH CV ECHO MEAS - TAPSE (>1.6): 2.5 CM
BH CV ECHO MEAS - TR MAX PG: 31.8 MMHG
BH CV ECHO MEAS - TR MAX VEL: 282 CM/SEC
BH CV ECHO MEASUREMENTS AVERAGE E/E' RATIO: 7.82
BH CV REST NUCLEAR ISOTOPE DOSE: 11 MCI
BH CV STRESS BP STAGE 1: NORMAL
BH CV STRESS BP STAGE 2: NORMAL
BH CV STRESS COMMENTS STAGE 1: NORMAL
BH CV STRESS COMMENTS STAGE 2: NORMAL
BH CV STRESS DOSE REGADENOSON STAGE 1: 0.4
BH CV STRESS DURATION MIN STAGE 1: 0
BH CV STRESS DURATION MIN STAGE 2: 4
BH CV STRESS DURATION SEC STAGE 1: 10
BH CV STRESS DURATION SEC STAGE 2: 0
BH CV STRESS HR STAGE 1: 87
BH CV STRESS HR STAGE 2: 109
BH CV STRESS NUCLEAR ISOTOPE DOSE: 29 MCI
BH CV STRESS PROTOCOL 1: NORMAL
BH CV STRESS RECOVERY BP: NORMAL MMHG
BH CV STRESS RECOVERY HR: 101 BPM
BH CV STRESS STAGE 1: 1
BH CV STRESS STAGE 2: 2
BH CV XLRA - TDI S': 15.9 CM/SEC
BUN SERPL-MCNC: 14 MG/DL (ref 6–20)
BUN/CREAT SERPL: 16.3 (ref 7–25)
CALCIUM SPEC-SCNC: 9.3 MG/DL (ref 8.6–10.5)
CHLORIDE SERPL-SCNC: 101 MMOL/L (ref 98–107)
CK SERPL-CCNC: 84 U/L (ref 20–200)
CO2 SERPL-SCNC: 25.1 MMOL/L (ref 22–29)
CREAT SERPL-MCNC: 0.86 MG/DL (ref 0.76–1.27)
DEPRECATED RDW RBC AUTO: 41.7 FL (ref 37–54)
EGFRCR SERPLBLD CKD-EPI 2021: 106.1 ML/MIN/1.73
EOSINOPHIL # BLD AUTO: 0.12 10*3/MM3 (ref 0–0.4)
EOSINOPHIL NFR BLD AUTO: 1 % (ref 0.3–6.2)
ERYTHROCYTE [DISTWIDTH] IN BLOOD BY AUTOMATED COUNT: 12.7 % (ref 12.3–15.4)
GLUCOSE BLDC GLUCOMTR-MCNC: 110 MG/DL (ref 70–105)
GLUCOSE BLDC GLUCOMTR-MCNC: 119 MG/DL (ref 70–105)
GLUCOSE SERPL-MCNC: 125 MG/DL (ref 65–99)
HCT VFR BLD AUTO: 36.9 % (ref 37.5–51)
HGB BLD-MCNC: 12.4 G/DL (ref 13–17.7)
IMM GRANULOCYTES # BLD AUTO: 0.08 10*3/MM3 (ref 0–0.05)
IMM GRANULOCYTES NFR BLD AUTO: 0.7 % (ref 0–0.5)
LYMPHOCYTES # BLD AUTO: 2.54 10*3/MM3 (ref 0.7–3.1)
LYMPHOCYTES NFR BLD AUTO: 22.2 % (ref 19.6–45.3)
MAXIMAL PREDICTED HEART RATE: 171 BPM
MCH RBC QN AUTO: 30 PG (ref 26.6–33)
MCHC RBC AUTO-ENTMCNC: 33.6 G/DL (ref 31.5–35.7)
MCV RBC AUTO: 89.1 FL (ref 79–97)
MONOCYTES # BLD AUTO: 0.71 10*3/MM3 (ref 0.1–0.9)
MONOCYTES NFR BLD AUTO: 6.2 % (ref 5–12)
NEUTROPHILS NFR BLD AUTO: 69.7 % (ref 42.7–76)
NEUTROPHILS NFR BLD AUTO: 7.99 10*3/MM3 (ref 1.7–7)
NRBC BLD AUTO-RTO: 0 /100 WBC (ref 0–0.2)
PERCENT MAX PREDICTED HR: 63.74 %
PLATELET # BLD AUTO: 348 10*3/MM3 (ref 140–450)
PMV BLD AUTO: 9.4 FL (ref 6–12)
POTASSIUM SERPL-SCNC: 3.6 MMOL/L (ref 3.5–5.2)
QT INTERVAL: 344 MS
QT INTERVAL: 346 MS
QT INTERVAL: 394 MS
QTC INTERVAL: 461 MS
QTC INTERVAL: 470 MS
QTC INTERVAL: 471 MS
RBC # BLD AUTO: 4.14 10*6/MM3 (ref 4.14–5.8)
SINUS: 2.9 CM
SODIUM SERPL-SCNC: 138 MMOL/L (ref 136–145)
SPECT HRT GATED+EF W RNC IV: 62 %
STRESS BASELINE BP: NORMAL MMHG
STRESS BASELINE HR: 87 BPM
STRESS PERCENT HR: 75 %
STRESS POST PEAK BP: NORMAL MMHG
STRESS POST PEAK HR: 109 BPM
STRESS TARGET HR: 145 BPM
TROPONIN T SERPL HS-MCNC: 12 NG/L
WBC NRBC COR # BLD AUTO: 11.46 10*3/MM3 (ref 3.4–10.8)

## 2025-03-20 PROCEDURE — 93017 CV STRESS TEST TRACING ONLY: CPT

## 2025-03-20 PROCEDURE — 84484 ASSAY OF TROPONIN QUANT: CPT | Performed by: EMERGENCY MEDICINE

## 2025-03-20 PROCEDURE — 93306 TTE W/DOPPLER COMPLETE: CPT | Performed by: INTERNAL MEDICINE

## 2025-03-20 PROCEDURE — 78452 HT MUSCLE IMAGE SPECT MULT: CPT

## 2025-03-20 PROCEDURE — 93018 CV STRESS TEST I&R ONLY: CPT | Performed by: INTERNAL MEDICINE

## 2025-03-20 PROCEDURE — 93306 TTE W/DOPPLER COMPLETE: CPT

## 2025-03-20 PROCEDURE — G0378 HOSPITAL OBSERVATION PER HR: HCPCS

## 2025-03-20 PROCEDURE — 85025 COMPLETE CBC W/AUTO DIFF WBC: CPT | Performed by: EMERGENCY MEDICINE

## 2025-03-20 PROCEDURE — 25010000002 REGADENOSON 0.4 MG/5ML SOLUTION: Performed by: EMERGENCY MEDICINE

## 2025-03-20 PROCEDURE — 25010000002 SULFUR HEXAFLUORIDE MICROSPH 60.7-25 MG RECONSTITUTED SUSPENSION: Performed by: EMERGENCY MEDICINE

## 2025-03-20 PROCEDURE — 82948 REAGENT STRIP/BLOOD GLUCOSE: CPT

## 2025-03-20 PROCEDURE — 78452 HT MUSCLE IMAGE SPECT MULT: CPT | Performed by: INTERNAL MEDICINE

## 2025-03-20 PROCEDURE — 80048 BASIC METABOLIC PNL TOTAL CA: CPT | Performed by: EMERGENCY MEDICINE

## 2025-03-20 PROCEDURE — 93005 ELECTROCARDIOGRAM TRACING: CPT | Performed by: EMERGENCY MEDICINE

## 2025-03-20 PROCEDURE — 34310000005 TECHNETIUM TETROFOSMIN KIT: Performed by: EMERGENCY MEDICINE

## 2025-03-20 PROCEDURE — 96361 HYDRATE IV INFUSION ADD-ON: CPT

## 2025-03-20 PROCEDURE — 82550 ASSAY OF CK (CPK): CPT | Performed by: PHYSICIAN ASSISTANT

## 2025-03-20 PROCEDURE — A9502 TC99M TETROFOSMIN: HCPCS | Performed by: EMERGENCY MEDICINE

## 2025-03-20 RX ORDER — SODIUM CHLORIDE 0.9 % (FLUSH) 0.9 %
10 SYRINGE (ML) INJECTION EVERY 12 HOURS SCHEDULED
Status: DISCONTINUED | OUTPATIENT
Start: 2025-03-20 | End: 2025-03-20 | Stop reason: HOSPADM

## 2025-03-20 RX ORDER — LEVOTHYROXINE SODIUM 25 UG/1
25 TABLET ORAL DAILY
Status: DISCONTINUED | OUTPATIENT
Start: 2025-03-20 | End: 2025-03-20 | Stop reason: HOSPADM

## 2025-03-20 RX ORDER — ROSUVASTATIN CALCIUM 10 MG/1
10 TABLET, COATED ORAL DAILY
Status: DISCONTINUED | OUTPATIENT
Start: 2025-03-20 | End: 2025-03-20 | Stop reason: HOSPADM

## 2025-03-20 RX ORDER — DEXTROSE MONOHYDRATE 25 G/50ML
25 INJECTION, SOLUTION INTRAVENOUS
Status: DISCONTINUED | OUTPATIENT
Start: 2025-03-20 | End: 2025-03-20 | Stop reason: HOSPADM

## 2025-03-20 RX ORDER — LISINOPRIL 20 MG/1
20 TABLET ORAL
Status: DISCONTINUED | OUTPATIENT
Start: 2025-03-20 | End: 2025-03-20 | Stop reason: HOSPADM

## 2025-03-20 RX ORDER — IBUPROFEN 600 MG/1
1 TABLET ORAL
Status: DISCONTINUED | OUTPATIENT
Start: 2025-03-20 | End: 2025-03-20 | Stop reason: HOSPADM

## 2025-03-20 RX ORDER — DULOXETIN HYDROCHLORIDE 30 MG/1
60 CAPSULE, DELAYED RELEASE ORAL EVERY 12 HOURS SCHEDULED
Status: DISCONTINUED | OUTPATIENT
Start: 2025-03-20 | End: 2025-03-20 | Stop reason: HOSPADM

## 2025-03-20 RX ORDER — REGADENOSON 0.08 MG/ML
0.4 INJECTION, SOLUTION INTRAVENOUS
Status: COMPLETED | OUTPATIENT
Start: 2025-03-20 | End: 2025-03-20

## 2025-03-20 RX ORDER — HYDROCHLOROTHIAZIDE 25 MG/1
25 TABLET ORAL
Status: DISCONTINUED | OUTPATIENT
Start: 2025-03-20 | End: 2025-03-20 | Stop reason: HOSPADM

## 2025-03-20 RX ORDER — ONDANSETRON 4 MG/1
4 TABLET, ORALLY DISINTEGRATING ORAL EVERY 6 HOURS PRN
Status: DISCONTINUED | OUTPATIENT
Start: 2025-03-20 | End: 2025-03-20 | Stop reason: HOSPADM

## 2025-03-20 RX ORDER — SODIUM CHLORIDE 9 MG/ML
40 INJECTION, SOLUTION INTRAVENOUS AS NEEDED
Status: DISCONTINUED | OUTPATIENT
Start: 2025-03-20 | End: 2025-03-20

## 2025-03-20 RX ORDER — ALUMINA, MAGNESIA, AND SIMETHICONE 2400; 2400; 240 MG/30ML; MG/30ML; MG/30ML
15 SUSPENSION ORAL EVERY 6 HOURS PRN
Status: DISCONTINUED | OUTPATIENT
Start: 2025-03-20 | End: 2025-03-20 | Stop reason: HOSPADM

## 2025-03-20 RX ORDER — ONDANSETRON 2 MG/ML
4 INJECTION INTRAMUSCULAR; INTRAVENOUS EVERY 6 HOURS PRN
Status: DISCONTINUED | OUTPATIENT
Start: 2025-03-20 | End: 2025-03-20 | Stop reason: HOSPADM

## 2025-03-20 RX ORDER — METFORMIN HYDROCHLORIDE 500 MG/1
500 TABLET, EXTENDED RELEASE ORAL
Status: DISCONTINUED | OUTPATIENT
Start: 2025-03-20 | End: 2025-03-20 | Stop reason: HOSPADM

## 2025-03-20 RX ORDER — NICOTINE POLACRILEX 4 MG
15 LOZENGE BUCCAL
Status: DISCONTINUED | OUTPATIENT
Start: 2025-03-20 | End: 2025-03-20 | Stop reason: HOSPADM

## 2025-03-20 RX ORDER — INSULIN LISPRO 100 [IU]/ML
2-9 INJECTION, SOLUTION INTRAVENOUS; SUBCUTANEOUS EVERY 6 HOURS SCHEDULED
Status: DISCONTINUED | OUTPATIENT
Start: 2025-03-20 | End: 2025-03-20 | Stop reason: HOSPADM

## 2025-03-20 RX ORDER — DULOXETIN HYDROCHLORIDE 30 MG/1
30 CAPSULE, DELAYED RELEASE ORAL EVERY 12 HOURS SCHEDULED
Status: DISCONTINUED | OUTPATIENT
Start: 2025-03-20 | End: 2025-03-20

## 2025-03-20 RX ORDER — SODIUM CHLORIDE 0.9 % (FLUSH) 0.9 %
10 SYRINGE (ML) INJECTION AS NEEDED
Status: DISCONTINUED | OUTPATIENT
Start: 2025-03-20 | End: 2025-03-20

## 2025-03-20 RX ADMIN — LISINOPRIL 20 MG: 20 TABLET ORAL at 11:35

## 2025-03-20 RX ADMIN — TETROFOSMIN 1 DOSE: 1.38 INJECTION, POWDER, LYOPHILIZED, FOR SOLUTION INTRAVENOUS at 08:42

## 2025-03-20 RX ADMIN — HYDROCHLOROTHIAZIDE 25 MG: 25 TABLET ORAL at 11:35

## 2025-03-20 RX ADMIN — LEVOTHYROXINE SODIUM 25 MCG: 0.03 TABLET ORAL at 11:35

## 2025-03-20 RX ADMIN — REGADENOSON 0.4 MG: 0.08 INJECTION, SOLUTION INTRAVENOUS at 08:42

## 2025-03-20 RX ADMIN — Medication 10 ML: at 11:35

## 2025-03-20 RX ADMIN — DULOXETINE 60 MG: 30 CAPSULE, DELAYED RELEASE ORAL at 11:35

## 2025-03-20 RX ADMIN — ROSUVASTATIN CALCIUM 10 MG: 10 TABLET, FILM COATED ORAL at 11:35

## 2025-03-20 RX ADMIN — TETROFOSMIN 1 DOSE: 1.38 INJECTION, POWDER, LYOPHILIZED, FOR SOLUTION INTRAVENOUS at 07:14

## 2025-03-20 RX ADMIN — SULFUR HEXAFLUORIDE 2 ML: KIT at 09:37

## 2025-03-20 NOTE — PLAN OF CARE
Goal Outcome Evaluation:  Plan of Care Reviewed With: patient        Progress: improving  Outcome Evaluation: Patient had a normal stress myoview and will be discharged. continue to monitor

## 2025-03-20 NOTE — PLAN OF CARE
Problem: Adult Inpatient Plan of Care  Goal: Plan of Care Review  Outcome: Progressing  Goal: Patient-Specific Goal (Individualized)  Outcome: Progressing  Goal: Absence of Hospital-Acquired Illness or Injury  Outcome: Progressing  Intervention: Identify and Manage Fall Risk  Recent Flowsheet Documentation  Taken 3/19/2025 2305 by Avani Kraus RN  Safety Promotion/Fall Prevention:   safety round/check completed   room organization consistent   clutter free environment maintained   assistive device/personal items within reach  Intervention: Prevent Skin Injury  Recent Flowsheet Documentation  Taken 3/19/2025 2305 by Avani Kraus RN  Body Position: position changed independently  Skin Protection: transparent dressing maintained  Intervention: Prevent and Manage VTE (Venous Thromboembolism) Risk  Recent Flowsheet Documentation  Taken 3/19/2025 2305 by Avani Kraus RN  VTE Prevention/Management: SCDs (sequential compression devices) off  Intervention: Prevent Infection  Recent Flowsheet Documentation  Taken 3/19/2025 2305 by Avani Kraus RN  Infection Prevention:   single patient room provided   rest/sleep promoted   environmental surveillance performed   hand hygiene promoted  Goal: Optimal Comfort and Wellbeing  Outcome: Progressing  Intervention: Provide Person-Centered Care  Recent Flowsheet Documentation  Taken 3/19/2025 2305 by Avani Kraus RN  Trust Relationship/Rapport:   care explained   choices provided   emotional support provided   empathic listening provided   questions answered   questions encouraged   reassurance provided   thoughts/feelings acknowledged  Goal: Readiness for Transition of Care  Outcome: Progressing  Intervention: Mutually Develop Transition Plan  Recent Flowsheet Documentation  Taken 3/19/2025 2309 by Avani Kraus RN  Equipment Currently Used at Home: none  Taken 3/19/2025 2307 by Avani Kraus RN  Transportation Anticipated: family or friend will provide  Patient/Family  Anticipated Services at Transition: none  Patient/Family Anticipates Transition to: home with family   Goal Outcome Evaluation:

## 2025-03-20 NOTE — DISCHARGE SUMMARY
[facility]    AngelaStacie marques BALAJI, ELSIE    CHIEF COMPLAINT:     Chest pain    HISTORY OF PRESENT ILLNESS:    Chest Pain   Associated symptoms include palpitations and syncope. Pertinent negatives include no cough, fever or shortness of breath.       ED  49-year-old gentleman who was sitting he had sudden onset of his heart racing he started to build some pressure and then he got lightheaded when he got up he passed out briefly landing on his bottom. He denies any head injury. Patient denies any recent injury illness flus viruses vaccinations foreign travels long car ride plane or immobilization no ill exposures. No leg pain or swelling. Nothing seem to trigger this or make it better or worse. No shortness of breath there is no neck arm or jaw pain no recent illness flus viruses vaccinations. No one at home with similar illness     Past Medical History:   Diagnosis Date    Abscess of leg, right 08/02/2017    Acute pharyngitis 03/15/2015    B12 deficiency 06/22/2018    Cellulitis 11/14/2014    Edema     Elbow pain 03/02/2015    Foot pain, left 09/20/2018    Gout 11/11/2014    Headache 02/21/2014    Hernia of abdominal cavity 03/02/2015    Hypertension 11/11/2014    Hypothyroidism 10/26/2017    Knee pain, right 04/15/2017    Laceration without foreign body of unspecified toe without damage to nail, initial encounter 06/22/2018    Leg pain, left     Lipoma of skin     Metatarsalgia, left foot 02/21/2019    Myalgia 10/26/2017    Obesity 02/21/2014    Obstructive sleep apnea 02/21/2014    Osteoarthritis 02/21/2014    Paresthesia 01/28/2016    Peripheral neuropathy 10/26/2017    Plantar fasciitis of right foot 01/28/2016    Pre-diabetes 06/22/2018    Renal insufficiency 06/22/2018    RUQ pain 06/01/2017    Sinusitis, acute 12/04/2015    Skin lesion 03/02/2015    Snoring     Sprain of tarsometatarsal ligament of left foot 01/11/2019    INITIAL ENCOUNTER    Stomatitis and mucositis 03/15/2015     (ULCERATIVE)    Unspecified fracture of left foot, initial encounter for closed fracture 09/20/2018    URI (upper respiratory infection) 01/15/2015    Vitamin B deficiency, unspecified 02/12/2016     Past Surgical History:   Procedure Laterality Date    GALLBLADDER SURGERY      HERNIA REPAIR      KNEE SURGERY Right     PILONIDAL CYSTECTOMY       Family History   Problem Relation Age of Onset    Cancer Mother     Hypertension Father     Osteoarthritis Father     No Known Problems Sister     Diabetes Brother      Social History     Tobacco Use    Smoking status: Never    Smokeless tobacco: Current     Types: Chew   Vaping Use    Vaping status: Never Used   Substance Use Topics    Alcohol use: Not Currently     Comment: sober since 2010    Drug use: Never     Facility-Administered Medications Prior to Admission   Medication Dose Route Frequency Provider Last Rate Last Admin    cyanocobalamin injection 1,000 mcg  1,000 mcg Intramuscular Q28 Days Stacie Miller APRN         Medications Prior to Admission   Medication Sig Dispense Refill Last Dose/Taking    DULoxetine HCl (DRIZALMA) 30 MG capsule Take 2 capsules by mouth 2 (Two) Times a Day.   3/19/2025 Morning    levothyroxine (SYNTHROID, LEVOTHROID) 25 MCG tablet TAKE 1 TABLET BY MOUTH DAILY 90 tablet 1 3/18/2025 Evening    lisinopril-hydrochlorothiazide (PRINZIDE,ZESTORETIC) 20-25 MG per tablet TAKE 1 TABLET BY MOUTH DAILY 30 tablet 1 3/18/2025 Evening    metFORMIN ER (GLUCOPHAGE-XR) 500 MG 24 hr tablet Take 1 tablet by mouth Daily With Dinner for 180 days. 90 tablet 1 3/18/2025 Evening    rosuvastatin (Crestor) 10 MG tablet Take 1 tablet by mouth Daily. 90 tablet 1 3/18/2025 Evening     Allergies:  Lyrica [pregabalin]    Immunization History   Administered Date(s) Administered    Tdap 01/10/2024           REVIEW OF SYSTEMS:    Review of Systems   Constitutional: Negative for fever.   Cardiovascular:  Positive for chest pain, palpitations and syncope.   Respiratory:   Negative for cough and shortness of breath.            Vital Signs  Temp:  [97.5 °F (36.4 °C)-98.2 °F (36.8 °C)] 98.2 °F (36.8 °C)  Heart Rate:  [] 86  Resp:  [16-19] 17  BP: (107-143)/(60-96) 124/79          Physical Exam:  Physical Exam  Constitutional:       Appearance: Normal appearance.   Cardiovascular:      Rate and Rhythm: Normal rate and regular rhythm.   Pulmonary:      Effort: Pulmonary effort is normal.      Breath sounds: Normal breath sounds.   Skin:     General: Skin is warm and dry.   Neurological:      General: No focal deficit present.      Mental Status: He is alert and oriented to person, place, and time. Mental status is at baseline.   Psychiatric:         Mood and Affect: Mood normal.         Behavior: Behavior normal.       Emotional Behavior:    wnl   Debilities:   None      Results Review:    I reviewed the patient's new clinical results.  Lab Results (most recent)       Procedure Component Value Units Date/Time    POC Glucose Once [[id number]]  (Abnormal) Collected: 03/20/25 1238    Specimen: Blood Updated: 03/20/25 1243     Glucose 110 mg/dL      Comment: Serial Number: [id number]:  [id number]       POC Glucose Once [[id number]]  (Abnormal) Collected: 03/20/25 0743    Specimen: Blood Updated: 03/20/25 0745     Glucose 119 mg/dL      Comment: Serial Number: [id number]:  [id number]       Basic Metabolic Panel [[id number]]  (Abnormal) Collected: 03/20/25 0443    Specimen: Blood from Arm, Right Updated: 03/20/25 0624     Glucose 125 mg/dL      BUN 14 mg/dL      Creatinine 0.86 mg/dL      Sodium 138 mmol/L      Potassium 3.6 mmol/L      Chloride 101 mmol/L      CO2 25.1 mmol/L      Calcium 9.3 mg/dL      BUN/Creatinine Ratio 16.3     Anion Gap 11.9 mmol/L      eGFR 106.1 mL/min/1.73     Narrative:      GFR Categories in Chronic Kidney Disease (CKD)      GFR Category          GFR (mL/min/1.73)    Interpretation  G1                     90 or greater         Normal or high  (1)  G2                      60-89                Mild decrease (1)  G3a                   45-59                Mild to moderate decrease  G3b                   30-44                Moderate to severe decrease  G4                    15-29                Severe decrease  G5                    14 or less           Kidney failure          (1)In the absence of evidence of kidney disease, neither GFR category G1 or G2 fulfill the criteria for CKD.    eGFR calculation 2021 CKD-EPI creatinine equation, which does not include race as a factor    High Sensitivity Troponin T [[id number]]  (Normal) Collected: 03/20/25 0443    Specimen: Blood from Arm, Right Updated: 03/20/25 0624     HS Troponin T 12 ng/L     Narrative:      High Sensitive Troponin T Reference Range:  <14.0 ng/L- Negative Female for AMI  <22.0 ng/L- Negative Male for AMI  >=14 - Abnormal Female indicating possible myocardial injury.  >=22 - Abnormal Male indicating possible myocardial injury.   Clinicians would have to utilize clinical acumen, EKG, Troponin, and serial changes to determine if it is an Acute Myocardial Infarction or myocardial injury due to an underlying chronic condition.         CBC & Differential [[id number]]  (Abnormal) Collected: 03/20/25 0443    Specimen: Blood from Arm, Right Updated: 03/20/25 0604    Narrative:      The following orders were created for panel order CBC & Differential.  Procedure                               Abnormality         Status                     ---------                               -----------         ------                     CBC Auto Differential[[id number]]        Abnormal            Final result                 Please view results for these tests on the individual orders.    CBC Auto Differential [[id number]]  (Abnormal) Collected: 03/20/25 0443    Specimen: Blood from Arm, Right Updated: 03/20/25 0604     WBC 11.46 10*3/mm3      RBC 4.14 10*6/mm3      Hemoglobin 12.4 g/dL      Hematocrit 36.9 %      MCV  89.1 fL      MCH 30.0 pg      MCHC 33.6 g/dL      RDW 12.7 %      RDW-SD 41.7 fl      MPV 9.4 fL      Platelets 348 10*3/mm3      Neutrophil % 69.7 %      Lymphocyte % 22.2 %      Monocyte % 6.2 %      Eosinophil % 1.0 %      Basophil % 0.2 %      Immature Grans % 0.7 %      Neutrophils, Absolute 7.99 10*3/mm3      Lymphocytes, Absolute 2.54 10*3/mm3      Monocytes, Absolute 0.71 10*3/mm3      Eosinophils, Absolute 0.12 10*3/mm3      Basophils, Absolute 0.02 10*3/mm3      Immature Grans, Absolute 0.08 10*3/mm3      nRBC 0.0 /100 WBC     High Sensitivity Troponin T 1Hr [[id number]]  (Abnormal) Collected: 03/19/25 2001    Specimen: Blood Updated: 03/19/25 2027     HS Troponin T 26 ng/L      Troponin T Numeric Delta 7 ng/L     Narrative:      High Sensitive Troponin T Reference Range:  <14.0 ng/L- Negative Female for AMI  <22.0 ng/L- Negative Male for AMI  >=14 - Abnormal Female indicating possible myocardial injury.  >=22 - Abnormal Male indicating possible myocardial injury.   Clinicians would have to utilize clinical acumen, EKG, Troponin, and serial changes to determine if it is an Acute Myocardial Infarction or myocardial injury due to an underlying chronic condition.         High Sensitivity Troponin T [[id number]]  (Normal) Collected: 03/19/25 1849    Specimen: Blood Updated: 03/19/25 1928     HS Troponin T 19 ng/L     Narrative:      High Sensitive Troponin T Reference Range:  <14.0 ng/L- Negative Female for AMI  <22.0 ng/L- Negative Male for AMI  >=14 - Abnormal Female indicating possible myocardial injury.  >=22 - Abnormal Male indicating possible myocardial injury.   Clinicians would have to utilize clinical acumen, EKG, Troponin, and serial changes to determine if it is an Acute Myocardial Infarction or myocardial injury due to an underlying chronic condition.         TSH Rfx On Abnormal To Free T4 [[id number]]  (Normal) Collected: 03/19/25 1849    Specimen: Blood Updated: 03/19/25 1928     TSH 3.350 uIU/mL      Magnesium [[id number]]  (Normal) Collected: 03/19/25 1849    Specimen: Blood Updated: 03/19/25 1928     Magnesium 1.9 mg/dL     Comprehensive Metabolic Panel [[id number]]  (Abnormal) Collected: 03/19/25 1849    Specimen: Blood Updated: 03/19/25 1928     Glucose 202 mg/dL      BUN 17 mg/dL      Creatinine 1.02 mg/dL      Sodium 140 mmol/L      Potassium 3.9 mmol/L      Chloride 100 mmol/L      CO2 23.1 mmol/L      Calcium 9.8 mg/dL      Total Protein 7.3 g/dL      Albumin 4.6 g/dL      ALT (SGPT) 34 U/L      AST (SGOT) 28 U/L      Alkaline Phosphatase 55 U/L      Total Bilirubin 0.4 mg/dL      Globulin 2.7 gm/dL      A/G Ratio 1.7 g/dL      BUN/Creatinine Ratio 16.7     Anion Gap 16.9 mmol/L      eGFR 90.1 mL/min/1.73     Narrative:      GFR Categories in Chronic Kidney Disease (CKD)      GFR Category          GFR (mL/min/1.73)    Interpretation  G1                     90 or greater         Normal or high (1)  G2                      60-89                Mild decrease (1)  G3a                   45-59                Mild to moderate decrease  G3b                   30-44                Moderate to severe decrease  G4                    15-29                Severe decrease  G5                    14 or less           Kidney failure          (1)In the absence of evidence of kidney disease, neither GFR category G1 or G2 fulfill the criteria for CKD.    eGFR calculation 2021 CKD-EPI creatinine equation, which does not include race as a factor    Sedimentation Rate [[id number]]  (Normal) Collected: 03/19/25 1849    Specimen: Blood Updated: 03/19/25 1915     Sed Rate 11 mm/hr     D-dimer, Quantitative [[id number]]  (Abnormal) Collected: 03/19/25 1849    Specimen: Blood Updated: 03/19/25 1906     D-Dimer, Quantitative 1.00 MCGFEU/mL     Narrative:      According to the assay 's published package insert, a normal (<0.50 MCGFEU/mL) D-dimer result in conjunction with a non-high clinical probability assessment, excludes  "deep vein thrombosis (DVT) and pulmonary embolism (PE) with high sensitivity.    D-dimer values increase with age and this can make VTE exclusion of an older population difficult. To address this, the [employer], based on best available evidence and recent guidelines, recommends that clinicians use age-adjusted D-dimer thresholds in patients greater than 50 years of age with: a) a low probability of PE who do not meet all Pulmonary Embolism Rule Out Criteria, or b) in those with intermediate probability of PE.   The formula for an age-adjusted D-dimer cut-off is \"age/100\".  For example, a 60 year old patient would have an age-adjusted cut-off of 0.60 MCGFEU/mL and an 80 year old 0.80 MCGFEU/mL.    Extra Tubes [[id number]] Collected: 03/19/25 1849    Specimen: Blood, Venous Line Updated: 03/19/25 1901    Narrative:      The following orders were created for panel order Extra Tubes.  Procedure                               Abnormality         Status                     ---------                               -----------         ------                     Gold Top - SST[[id number]]                                   Final result                 Please view results for these tests on the individual orders.    Gold Top - SST [[id number]] Collected: 03/19/25 1849    Specimen: Blood Updated: 03/19/25 1901     Extra Tube Hold for add-ons.     Comment: Auto resulted.       CBC & Differential [[id number]]  (Abnormal) Collected: 03/19/25 1849    Specimen: Blood Updated: 03/19/25 1854    Narrative:      The following orders were created for panel order CBC & Differential.  Procedure                               Abnormality         Status                     ---------                               -----------         ------                     CBC Auto Differential[[id number]]        Abnormal            Final result                 Please view results for these tests on the individual orders.    CBC Auto " Differential [[id number]]  (Abnormal) Collected: 03/19/25 1849    Specimen: Blood Updated: 03/19/25 1854     WBC 18.76 10*3/mm3      RBC 4.92 10*6/mm3      Hemoglobin 14.8 g/dL      Hematocrit 43.4 %      MCV 88.2 fL      MCH 30.1 pg      MCHC 34.1 g/dL      RDW 12.5 %      RDW-SD 40.4 fl      MPV 9.1 fL      Platelets 342 10*3/mm3      Neutrophil % 84.2 %      Lymphocyte % 8.4 %      Monocyte % 6.1 %      Eosinophil % 0.2 %      Basophil % 0.1 %      Immature Grans % 1.0 %      Neutrophils, Absolute 15.79 10*3/mm3      Lymphocytes, Absolute 1.58 10*3/mm3      Monocytes, Absolute 1.14 10*3/mm3      Eosinophils, Absolute 0.04 10*3/mm3      Basophils, Absolute 0.02 10*3/mm3      Immature Grans, Absolute 0.19 10*3/mm3      nRBC 0.0 /100 WBC             Imaging Results (Most Recent)       Procedure Component Value Units Date/Time    CT Angiogram Chest Pulmonary Embolism [[id number]] Collected: 03/19/25 2047     Updated: 03/19/25 2052    Narrative:      CT ANGIOGRAM CHEST PULMONARY EMBOLISM    Date of Exam: 3/19/2025 8:10 PM EDT    Indication: Chest pain short of breath palpitations near syncope elevated D-dimer.    Comparison: None available.    Technique: Axial CT images were obtained of the chest after the uneventful intravenous administration of iodinated contrast utilizing pulmonary embolism protocol.  In addition, a 3-D volume rendered image was created for interpretation.  Sagittal and   coronal reconstructions were performed.  Automated exposure control and iterative reconstruction methods were used.      Findings:    Pulmonary arteries:Adequate opacification of the pulmonary arteries. No evidence of acute pulmonary embolism.    Aorta: Unremarkable.    Lungs and Pleura: The lungs are clear. No pleural effusion or pneumothorax. The airways are patent.    Mediastinum/Emma: No lymphadenopathy. No suspicious mass.    Heart: Normal in size. No pericardial effusion. Normal RV/LV ratio.    Soft Tissue:  Unremarkable.      Upper Abdomen: Mild to moderate hepatic steatosis. Status post cholecystectomy. No definite acute abnormality.    Bones: No acute osseous abnormality.        Impression:      Impression:  1.No evidence of pulmonary embolism.  2.No acute intrathoracic abnormality.  3.Hepatic steatosis.        Electronically Signed: Baljit SocratesDO    3/19/2025 8:50 PM EDT    Workstation ID: [id number]    XR Chest 1 View [[id number]] Collected: 03/19/25 1928     Updated: 03/19/25 1931    Narrative:      XR CHEST 1 VW    Date of Exam: 3/19/2025 7:00 PM EDT    Indication: Chest pain palpitation    Comparison: Two-view chest x-ray 1/17/2013    Findings:  Lungs are adequately expanded and appear clear. No pneumothorax or large pleural effusion is seen. Cardiomediastinal contours appear stable.      Impression:      Impression:  No acute cardiopulmonary abnormality is identified.        Electronically Signed: Ysabel Sethi    3/19/2025 7:29 PM EDT    Workstation ID: [id number]    XR Sacrum & Coccyx [[id number]] Collected: 03/19/25 1927     Updated: 03/19/25 1930    Narrative:      XR SACRUM AND COCCYX    Date of Exam: 3/19/2025 7:00 PM EDT    Indication: Passed out fall injury    Comparison: None available.    Findings:  No displaced sacral or coccygeal fractures are identified. Sacroiliac joints appear symmetric.      Impression:      Impression:  No displaced sacral or coccygeal fractures identified on x-ray.        Electronically Signed: Ysabel Sethi    3/19/2025 7:28 PM EDT    Workstation ID: [id number]          reviewed    ECG/EMG Results (most recent)       Procedure Component Value Units Date/Time    ECG 12 Lead Chest Pain [[id number]] Collected: 03/20/25 0511     Updated: 03/20/25 0512     QT Interval 394 ms      QTC Interval 461 ms     Narrative:      HEART RATE=82  bpm  RR Reuhklul=276  ms  CT Pyuujhvi=600  ms  P Horizontal Axis=21  deg  P Front Axis=65  deg  QRSD Interval=89  ms  QT Zmuhxzap=350  ms  FXoI=846   ms  QRS Axis=30  deg  T Wave Axis=20  deg  - NORMAL ECG -  Sinus rhythm  Date and Time of Study:2025-03-20 05:11:12    ECG 12 Lead Chest Pain [[id number]] Collected: 03/19/25 2050     Updated: 03/20/25 0616     QT Interval 344 ms      QTC Interval 470 ms     Narrative:      HEART TSCA=717  bpm  RR Sqscxawv=451  ms  UT Gckkxvpo=573  ms  P Horizontal Axis=-8  deg  P Front Axis=64  deg  QRSD Interval=94  ms  QT Gpjcppii=088  ms  RXfY=155  ms  QRS Axis=35  deg  T Wave Axis=Invalid  deg  - BORDERLINE ECG -  Sinus tachycardia  Probable  left atrial enlargement  When compared with ECG of 19-Mar-2025 18:13:38,  No significant change  Electronically Signed By: Ulysses Ramey (RANCHO) 2025-03-20 06:15:54  Date and Time of Study:2025-03-19 20:50:05    ECG 12 Lead Chest Pain [[id number]] Collected: 03/19/25 1813     Updated: 03/20/25 0616     QT Interval 346 ms      QTC Interval 471 ms     Narrative:      HEART OIGK=467  bpm  RR Swykvzmm=157  ms  UT Renfrvgm=904  ms  P Horizontal Axis=9  deg  P Front Axis=58  deg  QRSD Interval=93  ms  QT Hindptnl=608  ms  XQtZ=458  ms  QRS Axis=23  deg  T Wave Axis=-29  deg  - OTHERWISE NORMAL ECG -  Sinus tachycardia  When compared with ECG of 28-Jul-2017 12:52:43,  Significant rate increase  Electronically Signed By: Ulysses Ramey (RANCHO) 2025-03-20 06:16:15  Date and Time of Study:2025-03-19 18:13:38    Telemetry Scan [[id number]] Resulted: 03/19/25     Updated: 03/20/25 0941    Telemetry Scan [[id number]] Resulted: 03/19/25     Updated: 03/20/25 1037    Telemetry Scan [[id number]] Resulted: 03/19/25     Updated: 03/20/25 1126    Adult Transthoracic Echo Complete W/ Cont if Necessary Per Protocol [[id number]] Resulted: 03/20/25 1232     Updated: 03/20/25 1232     LVIDd 3.9 cm      LVIDs 3.0 cm      IVSd 1.10 cm      LVPWd 1.00 cm      FS 23.1 %      IVS/LVPW 1.10 cm      ESV(cubed) 27.0 ml      EDV(cubed) 59.3 ml      LV mass(C)d 131.0 grams      LVOT area 2.5 cm2      LVOT diam 1.80 cm      EDV(MOD-sp4)  85.5 ml      ESV(MOD-sp4) 37.0 ml      SV(MOD-sp4) 48.5 ml      EF(MOD-sp4) 56.7 %      MV E max conner 106.0 cm/sec      MV A max conner 110.0 cm/sec      MV dec time 0.24 sec      MV E/A 0.96     Pulm A Revs Dur 0.09 sec      Med Peak E' Conner 14.4 cm/sec      Lat Peak E' Conner 12.7 cm/sec      TR max conner 282.0 cm/sec      Avg E/e' ratio 7.82     SV(LVOT) 57.5 ml      SV(RVOT) 39.7 ml      Qp/Qs 0.69     RVIDd 2.8 cm      TAPSE (>1.6) 2.5 cm      RV S' 15.9 cm/sec      LA dimension (2D)  4.0 cm      Pulm Sys Conner 68.8 cm/sec      Pulm Murray Conner 43.7 cm/sec      Pulm S/D 1.57     Pulm A Revs Conner 31.6 cm/sec      LV V1 max 129.0 cm/sec      LV V1 max PG 6.7 mmHg      LV V1 mean PG 3.0 mmHg      LV V1 VTI 22.6 cm      Ao pk conner 146.0 cm/sec      Ao max PG 8.5 mmHg      Ao mean PG 5.0 mmHg      Ao V2 VTI 26.5 cm      GERSON(I,D) 2.17 cm2      Dimensionless Index 0.85 (DI)      MV max PG 6.9 mmHg      MV mean PG 4.0 mmHg      MV V2 VTI 30.5 cm      MV P1/2t 81.4 msec      MVA(P1/2t) 2.7 cm2      MVA(VTI) 1.89 cm2      MV dec slope 539.5 cm/sec2      TR max PG 31.8 mmHg      RVOT diam 1.70 cm      RV V1 max PG 3.0 mmHg      RV V1 max 87.0 cm/sec      RV V1 VTI 17.5 cm      PA V2 max 124.0 cm/sec      ACS 1.90 cm      Sinus 2.9 cm     Narrative:        Left ventricular ejection fraction appears to be 61 - 65%.    Left ventricular diastolic function was not assessed and function was   normal.    Estimated right ventricular systolic pressure from tricuspid   regurgitation is normal (<35 mmHg).    Electronically signed by Jessica Pichardo MD, 03/20/25, 12:32 PM EDT.             reviewed    Results for orders placed during the hospital encounter of 03/04/25    Venous w Reflux Lower Extremity - Bilateral CAR    Interpretation Summary    There is superficial venous insufficiency of the left lower extremity in the following vein(s): great saphenous at thigh.    All other veins appear normal, bilaterally.      Results for orders placed during  the hospital encounter of 03/19/25    Adult Transthoracic Echo Complete W/ Cont if Necessary Per Protocol    Interpretation Summary    Left ventricular ejection fraction appears to be 61 - 65%.    Left ventricular diastolic function was not assessed and function was normal.    Estimated right ventricular systolic pressure from tricuspid regurgitation is normal (<35 mmHg).    Electronically signed by Jessica Pichardo MD, 03/20/25, 12:32 PM EDT.      Microbiology Results (last 10 days)       ** No results found for the last 240 hours. **            Assessment & Plan     Chest tightness     Chest pain  Lab Results   Component Value Date    TROPONINT 12 03/20/2025    TROPONINT 26 (H) 03/19/2025    TROPONINT 19 03/19/2025     -wbc 11  - cmp, sed rate, tsh, mag unremarkable  - ddimer 1.0  -Chest X-ray:reviewed and showing no acute cardiopulmonary process  - cta chest reviewed and showing no pe. No acute intrathoracic abnormality  -EKG:rate 111 sinus tach  -Stress Test performed and is low risk for ischemia  - echo performed and no valvular disease. EF 61-65% no pericardial effusion  -Telemetry  - mcot at discharge and follow up with cardiology      Arm tingling  - pt current with neurology  - upcoming appt with rheumatology      Hypothyroidism  - cont home synthroid  - tsh 3.3    Diabetes mellitus  -well controlled   Lab Results   Component Value Date    GLUCOSE 125 (H) 03/20/2025    GLUCOSE 202 (H) 03/19/2025    GLUCOSE 137 (H) 02/21/2025    GLUCOSE 101 (H) 01/10/2024     - last A1c 6.5  -Hold metformin  -ssi  -Diabetic diet  -Monitor before meals and at bedtime       I discussed the patients findings and my recommendations with patient and nursing staff.     Discharge Diagnosis:      Chest tightness      Hospital Course  Patient is a 49 y.o. male presented with chest pain. Er evaluated and admitted to observation. Labs including cmp, sed rate, tsh. Mag are normal. Ddimer 1.0  wbc 11. No infectious symptoms. Chest xray  is normal. Cta chest no pe EKG rate 11 sinus. Stress test low risk. Echo no valvular disease, ef 61-65%. Mcot at discharge. Follow up with cardiology. Discharge discussed with pt and he is agreeable to plan. Instructed pt to return to er if symptoms reoccur or worsen.       Past Medical History:     Past Medical History:   Diagnosis Date    Abscess of leg, right 08/02/2017    Acute pharyngitis 03/15/2015    B12 deficiency 06/22/2018    Cellulitis 11/14/2014    Edema     Elbow pain 03/02/2015    Foot pain, left 09/20/2018    Gout 11/11/2014    Headache 02/21/2014    Hernia of abdominal cavity 03/02/2015    Hypertension 11/11/2014    Hypothyroidism 10/26/2017    Knee pain, right 04/15/2017    Laceration without foreign body of unspecified toe without damage to nail, initial encounter 06/22/2018    Leg pain, left     Lipoma of skin     Metatarsalgia, left foot 02/21/2019    Myalgia 10/26/2017    Obesity 02/21/2014    Obstructive sleep apnea 02/21/2014    Osteoarthritis 02/21/2014    Paresthesia 01/28/2016    Peripheral neuropathy 10/26/2017    Plantar fasciitis of right foot 01/28/2016    Pre-diabetes 06/22/2018    Renal insufficiency 06/22/2018    RUQ pain 06/01/2017    Sinusitis, acute 12/04/2015    Skin lesion 03/02/2015    Snoring     Sprain of tarsometatarsal ligament of left foot 01/11/2019    INITIAL ENCOUNTER    Stomatitis and mucositis 03/15/2015    (ULCERATIVE)    Unspecified fracture of left foot, initial encounter for closed fracture 09/20/2018    URI (upper respiratory infection) 01/15/2015    Vitamin B deficiency, unspecified 02/12/2016       Past Surgical History:     Past Surgical History:   Procedure Laterality Date    GALLBLADDER SURGERY      HERNIA REPAIR      KNEE SURGERY Right     PILONIDAL CYSTECTOMY         Social History:   Social History     Socioeconomic History    Marital status:     Number of children: 1   Tobacco Use    Smoking status: Never    Smokeless tobacco: Current     Types:  Chew   Vaping Use    Vaping status: Never Used   Substance and Sexual Activity    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            Condition on Discharge:     Stable    Discharge Disposition      Discharge Medications     Discharge Medications        ASK your doctor about these medications        Instructions Start Date   DULoxetine HCl 30 MG capsule  Commonly known as: DRIZALMA   60 mg, 2 Times Daily      levothyroxine 25 MCG tablet  Commonly known as: SYNTHROID, LEVOTHROID   25 mcg, Oral, Daily      lisinopril-hydrochlorothiazide 20-25 MG per tablet  Commonly known as: PRINZIDE,ZESTORETIC   1 tablet, Oral, Daily      metFORMIN  MG 24 hr tablet  Commonly known as: GLUCOPHAGE-XR   500 mg, Oral, Daily With Dinner      rosuvastatin 10 MG tablet  Commonly known as: Crestor   10 mg, Oral, Daily               Discharge Diet:     Activity at Discharge:     Follow-up Appointments  Future Appointments   Date Time Provider Department Center   8/6/2025  2:15 PM Stacie Miller APRN [facility]   12/30/2025 11:00 AM Brenda Song APRN [facility]         Test Results Pending at Discharge  Pending Results       Procedure [Order ID] Specimen - Date/Time    Cardiac Event Monitor (ISIA[facility]) or Mobile Cardiac Outpatient Telemetry (M[facility]) [[id number]]              Risk for Readmission (LACE) Score: 5 (3/20/2025  6:00 AM)      Less Than 30 minutes spent in discharge activities for this patient    Signature:Electronically signed by Stephania Tolbert PA-C, 03/20/25, 1:57 PM EDT.

## 2025-03-20 NOTE — CASE MANAGEMENT/SOCIAL WORK
Case Management Discharge Note      Final Note: Routine home         Selected Continued Care - Discharged on 3/20/2025 Admission date: 3/19/2025 - Discharge disposition: Home or Self Care       Transportation Services  Private: Car    Final Discharge Disposition Code: 01 - home or self-care

## 2025-03-20 NOTE — CASE MANAGEMENT/SOCIAL WORK
Discharge Planning Assessment   Wally     Patient Name: Timo Hector  MRN: [id number]  Today's Date: 3/20/2025    Admit Date: 3/19/2025    Plan: Routine home with spouse   Discharge Needs Assessment       Row Name 03/20/25 1440       Living Environment    People in Home spouse    Name(s) of People in Home Jesusita    Current Living Arrangements home    Potentially Unsafe Housing Conditions none    In the past 12 months has the electric, gas, oil, or water company threatened to shut off services in your home? No    Primary Care Provided by self    Provides Primary Care For no one    Family Caregiver if Needed spouse    Family Caregiver Names Jesusita    Quality of Family Relationships helpful;involved;supportive    Able to Return to Prior Arrangements yes       Resource/Environmental Concerns    Resource/Environmental Concerns none    Transportation Concerns none       Transportation Needs    In the past 12 months, has lack of transportation kept you from medical appointments or from getting medications? no    In the past 12 months, has lack of transportation kept you from meetings, work, or from getting things needed for daily living? No       Food Insecurity    Within the past 12 months, you worried that your food would run out before you got the money to buy more. Never true    Within the past 12 months, the food you bought just didn't last and you didn't have money to get more. Never true       Transition Planning    Patient/Family Anticipates Transition to home with family    Patient/Family Anticipated Services at Transition none    Transportation Anticipated family or friend will provide       Discharge Needs Assessment    Readmission Within the Last 30 Days no previous admission in last 30 days    Equipment Currently Used at Home cpap  [facility]    Concerns to be Addressed denies needs/concerns at this time    Do you want help finding or keeping work or a job? Patient declined    Do you want help with school  or training? For example, starting or completing job training or getting a high school diploma, GED or equivalent Patient declined    Anticipated Changes Related to Illness none    Equipment Needed After Discharge none                   Discharge Plan       Row Name 03/20/25 1448       Plan    Plan Routine home with spouse    Patient/Family in Agreement with Plan yes    Plan Comments CM met with patient and wife at bedside. Confirmed PCP is leonard Worthy, insurance, and pharmacy.  Meds to beds denied. Patient denies any difficulty affording medications. Patient not current with any therapies. CPAP through [facility]. Confirmed transportation at discharge will be wife. D/C Barriers: pending stress test results                Demographic Summary       Row Name 03/20/25 1440       General Information    Admission Type observation    Arrived From emergency department    Referral Source admission list    Reason for Consult discharge planning    Preferred Language English       Contact Information    Permission Granted to Share Info With                    Functional Status       Row Name 03/20/25 1440       Functional Status    Usual Activity Tolerance good    Current Activity Tolerance good       Functional Status, IADL    Medications independent    Meal Preparation independent    Housekeeping independent    Laundry independent    Shopping independent    If for any reason you need help with day-to-day activities such as bathing, preparing meals, shopping, managing finances, etc., do you get the help you need? I get all the help I need               Clarita Delgado RN     Office: [phone]

## 2025-03-20 NOTE — ED PROVIDER NOTES
Subjective   History of Present Illness  Chief complaint pressure in chest heart racing lightheaded passed out    History of present illness this is a 49-year-old gentleman who was sitting he had sudden onset of his heart racing he started to build some pressure and then he got lightheaded when he got up he passed out briefly landing on his bottom.  He denies any head injury.  Patient denies any recent injury illness flus viruses vaccinations foreign travels long car ride plane or immobilization no ill exposures.  No leg pain or swelling.  Nothing seem to trigger this or make it better or worse.  No shortness of breath there is no neck arm or jaw pain no recent illness flus viruses vaccinations.  No one at home with similar illness      Review of Systems   Constitutional:  Negative for chills and fever.   HENT:  Negative for congestion.    Respiratory:  Positive for chest tightness and shortness of breath.    Cardiovascular:  Positive for chest pain and palpitations. Negative for leg swelling.   Gastrointestinal:  Negative for abdominal pain and vomiting.   Genitourinary:  Negative for difficulty urinating and dysuria.   Musculoskeletal:  Negative for back pain and neck pain.   Skin:  Negative for rash.   Neurological:  Positive for light-headedness. Negative for dizziness and speech difficulty.   Psychiatric/Behavioral:  Negative for confusion.        Past Medical History:   Diagnosis Date    Abscess of leg, right 08/02/2017    Acute pharyngitis 03/15/2015    B12 deficiency 06/22/2018    Cellulitis 11/14/2014    Edema     Elbow pain 03/02/2015    Foot pain, left 09/20/2018    Gout 11/11/2014    Headache 02/21/2014    Hernia of abdominal cavity 03/02/2015    Hypertension 11/11/2014    Hypothyroidism 10/26/2017    Knee pain, right 04/15/2017    Laceration without foreign body of unspecified toe without damage to nail, initial encounter 06/22/2018    Leg pain, left     Lipoma of skin     Metatarsalgia, left foot  02/21/2019    Myalgia 10/26/2017    Obesity 02/21/2014    Obstructive sleep apnea 02/21/2014    Osteoarthritis 02/21/2014    Paresthesia 01/28/2016    Peripheral neuropathy 10/26/2017    Plantar fasciitis of right foot 01/28/2016    Pre-diabetes 06/22/2018    Renal insufficiency 06/22/2018    RUQ pain 06/01/2017    Sinusitis, acute 12/04/2015    Skin lesion 03/02/2015    Snoring     Sprain of tarsometatarsal ligament of left foot 01/11/2019    INITIAL ENCOUNTER    Stomatitis and mucositis 03/15/2015    (ULCERATIVE)    Unspecified fracture of left foot, initial encounter for closed fracture 09/20/2018    URI (upper respiratory infection) 01/15/2015    Vitamin B deficiency, unspecified 02/12/2016       Allergies   Allergen Reactions    Lyrica [Pregabalin] Other (See Comments)     Chest pain       Past Surgical History:   Procedure Laterality Date    GALLBLADDER SURGERY      HERNIA REPAIR      KNEE SURGERY Right     PILONIDAL CYSTECTOMY         Family History   Problem Relation Age of Onset    Cancer Mother     Hypertension Father     Osteoarthritis Father     No Known Problems Sister     Diabetes Brother        Social History     Socioeconomic History    Marital status:     Number of children: 1   Tobacco Use    Smoking status: Never    Smokeless tobacco: Current     Types: Chew   Vaping Use    Vaping status: Never Used   Substance and Sexual Activity    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 Authorizing Provider   baclofen (LIORESAL) 10 MG tablet Take 1 tablet by mouth 3 (Three) Times a Day.    Provider, MD Christiane   DULoxetine (CYMBALTA) 60 MG capsule Take 1 capsule by mouth Daily.    Provider, MD Christiane   levothyroxine (SYNTHROID, LEVOTHROID) 25 MCG tablet TAKE 1 TABLET BY MOUTH DAILY 12/29/24 12/29/25  Stacie Miller APRN   lisinopril-hydrochlorothiazide  (PRINZIDE,ZESTORETIC) 20-25 MG per tablet TAKE 1 TABLET BY MOUTH DAILY 3/12/25   Stacie Miller APRN   metFORMIN ER (GLUCOPHAGE-XR) 500 MG 24 hr tablet Take 1 tablet by mouth Daily With Dinner for 180 days. 2/24/25 8/23/25  Stacie Miller APRN   rosuvastatin (Crestor) 10 MG tablet Take 1 tablet by mouth Daily. 2/24/25   Stacie Miller APRN          Objective   Physical Exam  Constitutional is a 49-year-old male awake alert no acute distress slightly tachycardic about 115.  Triage vital signs reviewed.  HEENT extraocular muscles intact pupils equal and reactive sclera clear the mouth is clear neck is supple no adenopathy no JV no bruits no meningeal signs lungs clear no retraction no use of accessory heart regular slight tachycardic without murmur rub abdomen soft nontender good bowel sounds no peritoneal findings or pulsatile masses extremities pulses equal upper and lower extremities no edema no cords no Homans' sign no evidence of DVT skin is warm dry without rashes or cellulitic changes neurologic awake alert follows commands motor strength normal no face asymmetry speech normal without focal weakness.  Patient has some pain to the buttock area but no fluctuance no crepitus obtains there is got some mild blood there there is nothing in the rectum itself..  Procedures           ED Course      Results for orders placed or performed during the hospital encounter of 03/19/25   ECG 12 Lead Chest Pain    Collection Time: 03/19/25  6:13 PM   Result Value Ref Range    QT Interval 346 ms    QTC Interval 471 ms   Comprehensive Metabolic Panel    Collection Time: 03/19/25  6:49 PM    Specimen: Blood   Result Value Ref Range    Glucose 202 (H) 65 - 99 mg/dL    BUN 17 6 - 20 mg/dL    Creatinine 1.02 0.76 - 1.27 mg/dL    Sodium 140 136 - 145 mmol/L    Potassium 3.9 3.5 - 5.2 mmol/L    Chloride 100 98 - 107 mmol/L    CO2 23.1 22.0 - 29.0 mmol/L    Calcium 9.8 8.6 - 10.5 mg/dL    Total Protein 7.3 6.0 - 8.5 g/dL     Albumin 4.6 3.5 - 5.2 g/dL    ALT (SGPT) 34 1 - 41 U/L    AST (SGOT) 28 1 - 40 U/L    Alkaline Phosphatase 55 39 - 117 U/L    Total Bilirubin 0.4 0.0 - 1.2 mg/dL    Globulin 2.7 gm/dL    A/G Ratio 1.7 g/dL    BUN/Creatinine Ratio 16.7 7.0 - 25.0    Anion Gap 16.9 (H) 5.0 - 15.0 mmol/L    eGFR 90.1 >60.0 mL/min/1.73   High Sensitivity Troponin T    Collection Time: 03/19/25  6:49 PM    Specimen: Blood   Result Value Ref Range    HS Troponin T 19 <22 ng/L   D-dimer, Quantitative    Collection Time: 03/19/25  6:49 PM    Specimen: Blood   Result Value Ref Range    D-Dimer, Quantitative 1.00 (H) 0.00 - 0.50 MCGFEU/mL   TSH Rfx On Abnormal To Free T4    Collection Time: 03/19/25  6:49 PM    Specimen: Blood   Result Value Ref Range    TSH 3.350 0.270 - 4.200 uIU/mL   Magnesium    Collection Time: 03/19/25  6:49 PM    Specimen: Blood   Result Value Ref Range    Magnesium 1.9 1.6 - 2.6 mg/dL   CBC Auto Differential    Collection Time: 03/19/25  6:49 PM    Specimen: Blood   Result Value Ref Range    WBC 18.76 (H) 3.40 - 10.80 10*3/mm3    RBC 4.92 4.14 - 5.80 10*6/mm3    Hemoglobin 14.8 13.0 - 17.7 g/dL    Hematocrit 43.4 37.5 - 51.0 %    MCV 88.2 79.0 - 97.0 fL    MCH 30.1 26.6 - 33.0 pg    MCHC 34.1 31.5 - 35.7 g/dL    RDW 12.5 12.3 - 15.4 %    RDW-SD 40.4 37.0 - 54.0 fl    MPV 9.1 6.0 - 12.0 fL    Platelets 342 140 - 450 10*3/mm3    Neutrophil % 84.2 (H) 42.7 - 76.0 %    Lymphocyte % 8.4 (L) 19.6 - 45.3 %    Monocyte % 6.1 5.0 - 12.0 %    Eosinophil % 0.2 (L) 0.3 - 6.2 %    Basophil % 0.1 0.0 - 1.5 %    Immature Grans % 1.0 (H) 0.0 - 0.5 %    Neutrophils, Absolute 15.79 (H) 1.70 - 7.00 10*3/mm3    Lymphocytes, Absolute 1.58 0.70 - 3.10 10*3/mm3    Monocytes, Absolute 1.14 (H) 0.10 - 0.90 10*3/mm3    Eosinophils, Absolute 0.04 0.00 - 0.40 10*3/mm3    Basophils, Absolute 0.02 0.00 - 0.20 10*3/mm3    Immature Grans, Absolute 0.19 (H) 0.00 - 0.05 10*3/mm3    nRBC 0.0 0.0 - 0.2 /100 WBC   Sedimentation Rate    Collection Time:  03/19/25  6:49 PM    Specimen: Blood   Result Value Ref Range    Sed Rate 11 0 - 15 mm/hr   Gold Top - SST    Collection Time: 03/19/25  6:49 PM   Result Value Ref Range    Extra Tube Hold for add-ons.    High Sensitivity Troponin T 1Hr    Collection Time: 03/19/25  8:01 PM    Specimen: Blood   Result Value Ref Range    HS Troponin T 26 (H) <22 ng/L    Troponin T Numeric Delta 7 (C) Abnormal if >/=3 ng/L   ECG 12 Lead Chest Pain    Collection Time: 03/19/25  8:50 PM   Result Value Ref Range    QT Interval 344 ms    QTC Interval 470 ms     CT Angiogram Chest Pulmonary Embolism  Result Date: 3/19/2025  Impression: 1.No evidence of pulmonary embolism. 2.No acute intrathoracic abnormality. 3.Hepatic steatosis. Electronically Signed: Baljit Crowell DO  3/19/2025 8:50 PM EDT  Workstation ID: [id number]    XR Chest 1 View  Result Date: 3/19/2025  Impression: No acute cardiopulmonary abnormality is identified. Electronically Signed: Ysabel Sethi  3/19/2025 7:29 PM EDT  Workstation ID: [id number]    XR Sacrum & Coccyx  Result Date: 3/19/2025  Impression: No displaced sacral or coccygeal fractures identified on x-ray. Electronically Signed: Ysabel Sethi  3/19/2025 7:28 PM EDT  Workstation ID: [id number]    Medications   sodium chloride 0.9 % flush 10 mL (has no administration in time range)   enoxaparin sodium (LOVENOX) syringe 100 mg (has no administration in time range)   lactated ringers bolus 500 mL (0 mL Intravenous Stopped 3/19/25 2041)   morphine injection 4 mg (4 mg Intravenous Given 3/19/25 1917)   ondansetron (ZOFRAN) injection 4 mg (4 mg Intravenous Given 3/19/25 1916)   aspirin tablet 325 mg (325 mg Oral Given 3/19/25 1916)   iopamidol (ISOVUE-370) 76 % injection 100 mL (100 mL Intravenous Given 3/19/25 2034)                                         EKG my interpretation normal sinus rhythm rate 110 normal axis hypertrophy QTc of 471 no acute ST elevations otherwise normal EKG.  Patient is unchanged from  7/20/2017    Repeat EKG my interpretation sinus tachycardia rate of 110 normal axis no hypertrophy some possible left atrial enlargement QTc of 470 unchanged from the previous EKG earlier today is otherwise unremarkable EKG          Medical Decision Making  Medical Decision Making.  IV established monitor placed my review of sinus tachycardia EKG obtained my interpretation patient had a normal sinus rhythm rate of 110 normal axis no Rajendra.  No acute escalations otherwise normal and unchanged in 7/20/2017.  Patient had 5 cc lactated Ringer's fluid bolus chest x-ray my independent review no pneumonia pneumothorax or failure acute findings radiology unremarkable x-ray of the coccyx and sacrum my independent no fracture or dislocation radiology unremarkable.  Labs obtained by pit review comprehensive metabolic profile unremarkable blood sugar 202 TSH magnesium unremarkable patient had a CBC white count was 18,000 otherwise unremarkable for troponin was 19 with repeat in 1 hour of 26.  Sed rate was normal.  The patient repeat exam resting comfortably no pain and discomfort.  Heart rate about 110 on the monitor and repeat EKG on my interpretation normal sinus rhythm rate of 110 normal axis hypertrophy QTc 470 normal unchanged from 1 earlier today unremarkable.  Patient is here given morphine for IV and Zofran 4 mg IV and had been given aspirin p.o.  The patient was given Lovenox 1 mg kilogram subcutaneous he underwent a CT scan PE protocol by independent review I do not see any evidence of any pulmonary embolism or dissection or pneumonia or other acute findings radiology review showed a fatty liver but otherwise negative.  Patient was resting comfortably at 9:05 PM.  No distress no pain pulses equal upper lower extremities lungs clear heart is regular without murmur abdomen soft no tenderness or pulsatile masses.  Patient states she has had a pilonidal cyst has been taken off the back he states that every once a little  draining bleed he has no pain recently from that he does have a little bit of blood from here but there is no fluctuance crepitus appears air-filled or purulent drainage.  White count was elevated 18,000 unsure of the ETIOLOGY.  May just be stress-induced.  But will repeat in the morning I do not see any obvious source of infection or reason to give him antibiotics or evidence of sepsis at this point.  He will be placed in the observation unit he will have a repeat troponin morning stress testing and echo and further workup stable otherwise unremarkable ER course.  I do not see notes acute ST was myocardial infarction pneumonia pericarditis myocarditis pericardial fusion or dissection or sepsis bacteremia based on my history and physical clinical findings although not a complete list of all possibilities    Problems Addressed:  Chest tightness: complicated acute illness or injury  Palpitations: complicated acute illness or injury  Syncope, unspecified syncope type: complicated acute illness or injury    Amount and/or Complexity of Data Reviewed  Labs: ordered. Decision-making details documented in ED Course.  Radiology: ordered and independent interpretation performed. Decision-making details documented in ED Course.  ECG/medicine tests: ordered and independent interpretation performed. Decision-making details documented in ED Course.    Risk  Parenteral controlled substances.  Decision regarding hospitalization.        Final diagnoses:   Palpitations   Chest tightness   Syncope, unspecified syncope type       ED Disposition  ED Disposition       ED Disposition   Decision to Admit    Condition   --    Comment   --               No follow-up provider specified.       Medication List      No changes were made to your prescriptions during this visit.            Ulysses Ramey MD  03/19/25 [id number]

## 2025-03-27 LAB
QT INTERVAL: 394 MS
QTC INTERVAL: 461 MS

## 2025-04-04 LAB
CV ZIO BASELINE AVG BPM: 95
CV ZIO BASELINE BPM HIGH: 135
CV ZIO BASELINE BPM LOW: 60
CV ZIO PRESCRIPTION STATUS: NORMAL

## 2025-04-22 ENCOUNTER — OFFICE VISIT (OUTPATIENT)
Dept: CARDIOLOGY | Facility: CLINIC | Age: 50
End: 2025-04-22
Payer: COMMERCIAL

## 2025-04-22 VITALS
BODY MASS INDEX: 34.25 KG/M2 | DIASTOLIC BLOOD PRESSURE: 99 MMHG | SYSTOLIC BLOOD PRESSURE: 157 MMHG | HEIGHT: 68 IN | OXYGEN SATURATION: 98 % | HEART RATE: 110 BPM | WEIGHT: 226 LBS

## 2025-04-22 DIAGNOSIS — E78.5 DYSLIPIDEMIA: ICD-10-CM

## 2025-04-22 DIAGNOSIS — R00.2 PALPITATIONS: Primary | ICD-10-CM

## 2025-04-22 DIAGNOSIS — I10 PRIMARY HYPERTENSION: ICD-10-CM

## 2025-04-22 PROCEDURE — 99203 OFFICE O/P NEW LOW 30 MIN: CPT | Performed by: INTERNAL MEDICINE

## 2025-04-22 NOTE — PROGRESS NOTES
HP      Name: Timo Hector ADMIT: (Not on file)   : 1975  PCP: Paras Worthy MD    MRN: [id number] LOS: 0 days   AGE/SEX: 49 y.o. male  ROOM: Room/bed info not found     Chief Complaint   Patient presents with    Consult     NP Ref Stephania for palp       Subjective        History of present illness  Timo Hector is a 49-year-old male patient who is here today for evaluation of palpitations.  Patient has dyslipidemia, hypertension, hypothyroidism and has unexplained neuropathy.  Patient had an episode of palpitations in 2025 and he went to the ER for it.  No diagnosis was found and he was discharged home.    Past Medical History:   Diagnosis Date    Abscess of leg, right 2017    Acute pharyngitis 03/15/2015    B12 deficiency 2018    Cellulitis 2014    Edema     Elbow pain 2015    Foot pain, left 2018    Gout 2014    Headache 2014    Hernia of abdominal cavity 2015    Hypertension 2014    Hypothyroidism 10/26/2017    Knee pain, right 04/15/2017    Laceration without foreign body of unspecified toe without damage to nail, initial encounter 2018    Leg pain, left     Lipoma of skin     Metatarsalgia, left foot 2019    Myalgia 10/26/2017    Obesity 2014    Obstructive sleep apnea 2014    Osteoarthritis 2014    Paresthesia 2016    Peripheral neuropathy 10/26/2017    Plantar fasciitis of right foot 2016    Pre-diabetes 2018    Renal insufficiency 2018    RUQ pain 2017    Sinusitis, acute 2015    Skin lesion 2015    Snoring     Sprain of tarsometatarsal ligament of left foot 2019    INITIAL ENCOUNTER    Stomatitis and mucositis 03/15/2015    (ULCERATIVE)    Unspecified fracture of left foot, initial encounter for closed fracture 2018    URI (upper respiratory infection) 01/15/2015    Vitamin B deficiency, unspecified 2016     Past Surgical History:    Procedure Laterality Date    GALLBLADDER SURGERY      HERNIA REPAIR      KNEE SURGERY Right     PILONIDAL CYSTECTOMY       Family History   Problem Relation Age of Onset    Cancer Mother     Hypertension Father     Osteoarthritis Father     No Known Problems Sister     Diabetes Brother      Social History     Tobacco Use    Smoking status: Never     Passive exposure: Never    Smokeless tobacco: Current     Types: Chew   Vaping Use    Vaping status: Never Used   Substance Use Topics    Alcohol use: Not Currently     Comment: sober since 2010    Drug use: Never     (Not in a hospital admission)    Allergies:  Lyrica [pregabalin]    Review of systems    Constitutional: Negative.    Respiratory and cardiovascular: As detailed in HPI section.  Gastrointestinal: Negative for constipation, nausea and vomiting negative for abdominal distention, abdominal pain and diarrhea.   Genitourinary: Negative for difficulty urinating and flank pain.   Musculoskeletal: Negative for arthralgias, joint swelling and myalgias.   Skin: Negative for color change, rash and wound.   Neurological: Negative for dizziness, syncope, weakness and headaches.   Hematological: Negative for adenopathy.   Psychiatric/Behavioral: Negative for confusion.   All other systems reviewed and are negative.    Physical Exam  VITALS REVIEWED    General:      well developed, in no acute distress.    Head:      normocephalic and atraumatic.    Eyes:      PERRL/EOM intact, conjunctiva and sclera clear with out nystagmus.    Neck:      no masses, thyromegaly,  trachea central with normal respiratory effort and PMI displaced laterally  Lungs:      Clear to auscultation bilaterally  Heart:       Regular rate and rhythm, no murmur  Msk:      no deformity or scoliosis noted of thoracic or lumbar spine.    Pulses:      pulses normal in all 4 extremities.    Extremities:       No lower extremity edema, redness  Neurologic:      no focal deficits.   alert oriented  x3  Skin:      intact without lesions or rashes.    Psych:      alert and cooperative; normal mood and affect; normal attention span and concentration.      Result Review :               Pertinent cardiac workup    Holter monitor 1 day and 18 hours starting on 3/20/2025, sinus rhythm throughout.  Echo 3/20/2025 EF 60 to 65%.  Stress test 3/20/2025 low risk, EF 62%.      Procedures        Assessment and Plan      Timo Hector is a 49-year-old male patient with no prior history of any cardiac issues, patient has hypertension, dyslipidemia, hypothyroidism.  In February 2025 lab work showed very high cholesterol and LDL levels.  He was started on Crestor 10 mg once a day but has not picked it up yet.  I advised him to do so and start taking it.  He did have 1 episode of palpitations and went to the ER for it.  However workup was negative, sinus rhythm, normal EF and normal perfusion on stress test.  No further recommendations at this time, we will see him in follow-up in 1 year.  If he has more palpitations then we can perform another monitor study.    Diagnoses and all orders for this visit:    1. Palpitations (Primary)    2. Primary hypertension    3. Dyslipidemia           Return in about 1 year (around 4/22/2026).  Patient was given instructions and counseling regarding his condition or for health maintenance advice. Please see specific information pulled into the AVS if appropriate.       Electronically signed by Jessica Pichardo MD, 04/22/25, 10:46 AM EDT.

## 2025-04-23 ENCOUNTER — PATIENT ROUNDING (BHMG ONLY) (OUTPATIENT)
Dept: CARDIOLOGY | Facility: CLINIC | Age: 50
End: 2025-04-23
Payer: COMMERCIAL

## 2025-04-24 LAB
CV ZIO BASELINE AVG BPM: 95 BPM
CV ZIO BASELINE BPM HIGH: 135 BPM
CV ZIO BASELINE BPM LOW: 60 BPM
CV ZIO DEVICE ANALYSIS TIME: NORMAL
CV ZIO ECT SVE COUNT: 2518 EPISODES
CV ZIO ECT SVE CPLT COUNT: 1 EPISODES
CV ZIO ECT SVE CPLT FREQ: NORMAL
CV ZIO ECT SVE FREQ: NORMAL
CV ZIO ECT SVE TPLT COUNT: 0 EPISODES
CV ZIO ECT SVE TPLT FREQ: 0
CV ZIO ECT VE COUNT: 182 EPISODES
CV ZIO ECT VE CPLT COUNT: 1 EPISODES
CV ZIO ECT VE CPLT FREQ: NORMAL
CV ZIO ECT VE FREQ: NORMAL
CV ZIO ECT VE TPLT COUNT: 0 EPISODES
CV ZIO ECT VE TPLT FREQ: 0
CV ZIO ECTOPIC SVE COUPLET RAW PERCENT: 0 %
CV ZIO ECTOPIC SVE ISOLATED PERCENT: 1.34 %
CV ZIO ECTOPIC SVE TRIPLET RAW PERCENT: 0 %
CV ZIO ECTOPIC VE COUPLET RAW PERCENT: 0 %
CV ZIO ECTOPIC VE ISOLATED PERCENT: 0.1 %
CV ZIO ECTOPIC VE TRIPLET RAW PERCENT: 0 %
CV ZIO ENROLLMENT END: NORMAL
CV ZIO ENROLLMENT START: NORMAL
CV ZIO PATIENT EVENTS DIARIES: 0
CV ZIO PATIENT EVENTS TRIGGERS: 2
CV ZIO PAUSE COUNT: 0
CV ZIO PRESCRIPTION STATUS: NORMAL
CV ZIO SVT COUNT: 0
CV ZIO TOTAL  ENROLLMENT PERIOD: NORMAL
CV ZIO VT COUNT: 0

## 2025-05-19 RX ORDER — LISINOPRIL AND HYDROCHLOROTHIAZIDE 20; 25 MG/1; MG/1
1 TABLET ORAL DAILY
Qty: 30 TABLET | Refills: 1 | Status: SHIPPED | OUTPATIENT
Start: 2025-05-19

## 2025-07-23 ENCOUNTER — APPOINTMENT (OUTPATIENT)
Dept: GENERAL RADIOLOGY | Facility: HOSPITAL | Age: 50
End: 2025-07-23
Payer: COMMERCIAL

## 2025-07-23 ENCOUNTER — HOSPITAL ENCOUNTER (OUTPATIENT)
Facility: HOSPITAL | Age: 50
Setting detail: OBSERVATION
Discharge: ANOTHER HOSPITAL | End: 2025-07-25
Attending: EMERGENCY MEDICINE | Admitting: FAMILY MEDICINE
Payer: COMMERCIAL

## 2025-07-23 ENCOUNTER — APPOINTMENT (OUTPATIENT)
Dept: CARDIOLOGY | Facility: HOSPITAL | Age: 50
End: 2025-07-23
Payer: COMMERCIAL

## 2025-07-23 DIAGNOSIS — M00.261 STREPTOCOCCAL ARTHRITIS OF RIGHT KNEE: ICD-10-CM

## 2025-07-23 DIAGNOSIS — F10.930 ALCOHOL WITHDRAWAL SYNDROME WITHOUT COMPLICATION: ICD-10-CM

## 2025-07-23 DIAGNOSIS — M00.061 STAPHYLOCOCCAL ARTHRITIS OF RIGHT KNEE: Primary | ICD-10-CM

## 2025-07-23 PROBLEM — R00.0 TACHYCARDIA: Status: ACTIVE | Noted: 2025-07-23

## 2025-07-23 LAB
AMPHET+METHAMPHET UR QL: NEGATIVE
AMPHETAMINES UR QL: NEGATIVE
B PARAPERT DNA SPEC QL NAA+PROBE: NOT DETECTED
B PERT DNA SPEC QL NAA+PROBE: NOT DETECTED
BACTERIA UR QL AUTO: ABNORMAL /HPF
BARBITURATES UR QL SCN: NEGATIVE
BASOPHILS # BLD AUTO: 0.02 10*3/MM3 (ref 0–0.2)
BASOPHILS NFR BLD AUTO: 0.2 % (ref 0–1.5)
BENZODIAZ UR QL SCN: POSITIVE
BILIRUB UR QL STRIP: NEGATIVE
BUPRENORPHINE SERPL-MCNC: NEGATIVE NG/ML
C PNEUM DNA NPH QL NAA+NON-PROBE: NOT DETECTED
CANNABINOIDS SERPL QL: NEGATIVE
CLARITY UR: ABNORMAL
COCAINE UR QL: NEGATIVE
COLOR UR: ABNORMAL
D-LACTATE SERPL-SCNC: 1.7 MMOL/L (ref 0.5–2)
D-LACTATE SERPL-SCNC: 3 MMOL/L (ref 0.5–2)
DEPRECATED RDW RBC AUTO: 43.8 FL (ref 37–54)
EOSINOPHIL # BLD AUTO: 0.01 10*3/MM3 (ref 0–0.4)
EOSINOPHIL NFR BLD AUTO: 0.1 % (ref 0.3–6.2)
ERYTHROCYTE [DISTWIDTH] IN BLOOD BY AUTOMATED COUNT: 13.1 % (ref 12.3–15.4)
FLUAV SUBTYP SPEC NAA+PROBE: NOT DETECTED
FLUAV SUBTYP SPEC NAA+PROBE: NOT DETECTED
FLUBV RNA NPH QL NAA+NON-PROBE: NOT DETECTED
FLUBV RNA NPH QL NAA+NON-PROBE: NOT DETECTED
GLUCOSE BLDC GLUCOMTR-MCNC: 186 MG/DL (ref 70–105)
GLUCOSE UR STRIP-MCNC: NEGATIVE MG/DL
HADV DNA SPEC NAA+PROBE: NOT DETECTED
HCOV 229E RNA SPEC QL NAA+PROBE: NOT DETECTED
HCOV HKU1 RNA SPEC QL NAA+PROBE: NOT DETECTED
HCOV NL63 RNA SPEC QL NAA+PROBE: NOT DETECTED
HCOV OC43 RNA SPEC QL NAA+PROBE: NOT DETECTED
HCT VFR BLD AUTO: 41.6 % (ref 37.5–51)
HGB BLD-MCNC: 14.1 G/DL (ref 13–17.7)
HGB UR QL STRIP.AUTO: ABNORMAL
HMPV RNA NPH QL NAA+NON-PROBE: NOT DETECTED
HPIV1 RNA ISLT QL NAA+PROBE: NOT DETECTED
HPIV2 RNA SPEC QL NAA+PROBE: NOT DETECTED
HPIV3 RNA NPH QL NAA+PROBE: NOT DETECTED
HPIV4 P GENE NPH QL NAA+PROBE: NOT DETECTED
HYALINE CASTS UR QL AUTO: ABNORMAL /LPF
IMM GRANULOCYTES # BLD AUTO: 0.14 10*3/MM3 (ref 0–0.05)
IMM GRANULOCYTES NFR BLD AUTO: 1.7 % (ref 0–0.5)
KETONES UR QL STRIP: ABNORMAL
LEUKOCYTE ESTERASE UR QL STRIP.AUTO: NEGATIVE
LYMPHOCYTES # BLD AUTO: 0.32 10*3/MM3 (ref 0.7–3.1)
LYMPHOCYTES NFR BLD AUTO: 3.9 % (ref 19.6–45.3)
M PNEUMO IGG SER IA-ACNC: NOT DETECTED
MCH RBC QN AUTO: 30.6 PG (ref 26.6–33)
MCHC RBC AUTO-ENTMCNC: 33.9 G/DL (ref 31.5–35.7)
MCV RBC AUTO: 90.2 FL (ref 79–97)
METHADONE UR QL SCN: NEGATIVE
MONOCYTES # BLD AUTO: 0.29 10*3/MM3 (ref 0.1–0.9)
MONOCYTES NFR BLD AUTO: 3.5 % (ref 5–12)
MUCOUS THREADS URNS QL MICRO: ABNORMAL /HPF
NEUTROPHILS NFR BLD AUTO: 7.46 10*3/MM3 (ref 1.7–7)
NEUTROPHILS NFR BLD AUTO: 90.6 % (ref 42.7–76)
NITRITE UR QL STRIP: NEGATIVE
OPIATES UR QL: NEGATIVE
OXYCODONE UR QL SCN: NEGATIVE
PCP UR QL SCN: NEGATIVE
PH UR STRIP.AUTO: 5.5 [PH] (ref 5–8)
PLATELET # BLD AUTO: 194 10*3/MM3 (ref 140–450)
PMV BLD AUTO: 9 FL (ref 6–12)
PROT UR QL STRIP: ABNORMAL
RBC # BLD AUTO: 4.61 10*6/MM3 (ref 4.14–5.8)
RBC # UR STRIP: ABNORMAL /HPF
REF LAB TEST METHOD: ABNORMAL
RHINOVIRUS RNA SPEC NAA+PROBE: NOT DETECTED
RSV RNA NPH QL NAA+NON-PROBE: NOT DETECTED
SARS-COV-2 RNA RESP QL NAA+PROBE: NOT DETECTED
SARS-COV-2 RNA RESP QL NAA+PROBE: NOT DETECTED
SP GR UR STRIP: >=1.03 (ref 1–1.03)
SQUAMOUS #/AREA URNS HPF: ABNORMAL /HPF
TRICYCLICS UR QL SCN: NEGATIVE
UROBILINOGEN UR QL STRIP: ABNORMAL
WBC # UR STRIP: ABNORMAL /HPF
WBC NRBC COR # BLD AUTO: 8.24 10*3/MM3 (ref 3.4–10.8)

## 2025-07-23 PROCEDURE — 25010000002 KETOROLAC TROMETHAMINE PER 15 MG

## 2025-07-23 PROCEDURE — 93010 ELECTROCARDIOGRAM REPORT: CPT | Performed by: EMERGENCY MEDICINE

## 2025-07-23 PROCEDURE — 87040 BLOOD CULTURE FOR BACTERIA: CPT

## 2025-07-23 PROCEDURE — 71045 X-RAY EXAM CHEST 1 VIEW: CPT

## 2025-07-23 PROCEDURE — 25010000002 SULFUR HEXAFLUORIDE MICROSPH 60.7-25 MG RECONSTITUTED SUSPENSION: Performed by: INTERNAL MEDICINE

## 2025-07-23 PROCEDURE — 93306 TTE W/DOPPLER COMPLETE: CPT | Performed by: INTERNAL MEDICINE

## 2025-07-23 PROCEDURE — 25010000002 LORAZEPAM PER 2 MG

## 2025-07-23 PROCEDURE — 81001 URINALYSIS AUTO W/SCOPE: CPT

## 2025-07-23 PROCEDURE — 25810000003 SODIUM CHLORIDE 0.9 % SOLUTION

## 2025-07-23 PROCEDURE — 36415 COLL VENOUS BLD VENIPUNCTURE: CPT

## 2025-07-23 PROCEDURE — 99285 EMERGENCY DEPT VISIT HI MDM: CPT

## 2025-07-23 PROCEDURE — G0378 HOSPITAL OBSERVATION PER HR: HCPCS

## 2025-07-23 PROCEDURE — 96365 THER/PROPH/DIAG IV INF INIT: CPT

## 2025-07-23 PROCEDURE — 83605 ASSAY OF LACTIC ACID: CPT

## 2025-07-23 PROCEDURE — 63710000001 INSULIN LISPRO (HUMAN) PER 5 UNITS: Performed by: INTERNAL MEDICINE

## 2025-07-23 PROCEDURE — 87186 SC STD MICRODIL/AGAR DIL: CPT

## 2025-07-23 PROCEDURE — 80306 DRUG TEST PRSMV INSTRMNT: CPT | Performed by: INTERNAL MEDICINE

## 2025-07-23 PROCEDURE — 73562 X-RAY EXAM OF KNEE 3: CPT

## 2025-07-23 PROCEDURE — 80053 COMPREHEN METABOLIC PANEL: CPT

## 2025-07-23 PROCEDURE — 96361 HYDRATE IV INFUSION ADD-ON: CPT

## 2025-07-23 PROCEDURE — 93005 ELECTROCARDIOGRAM TRACING: CPT

## 2025-07-23 PROCEDURE — 93306 TTE W/DOPPLER COMPLETE: CPT

## 2025-07-23 PROCEDURE — 25810000003 LACTATED RINGERS PER 1000 ML: Performed by: INTERNAL MEDICINE

## 2025-07-23 PROCEDURE — 96375 TX/PRO/DX INJ NEW DRUG ADDON: CPT

## 2025-07-23 PROCEDURE — 25010000002 ONDANSETRON PER 1 MG

## 2025-07-23 PROCEDURE — 87077 CULTURE AEROBIC IDENTIFY: CPT

## 2025-07-23 PROCEDURE — 25010000002 MORPHINE PER 10 MG: Performed by: INTERNAL MEDICINE

## 2025-07-23 PROCEDURE — 25010000002 PIPERACILLIN SOD-TAZOBACTAM PER 1 G: Performed by: INTERNAL MEDICINE

## 2025-07-23 PROCEDURE — 85025 COMPLETE CBC W/AUTO DIFF WBC: CPT

## 2025-07-23 PROCEDURE — 82948 REAGENT STRIP/BLOOD GLUCOSE: CPT

## 2025-07-23 PROCEDURE — 96376 TX/PRO/DX INJ SAME DRUG ADON: CPT

## 2025-07-23 PROCEDURE — 87154 CUL TYP ID BLD PTHGN 6+ TRGT: CPT

## 2025-07-23 PROCEDURE — 87636 SARSCOV2 & INF A&B AMP PRB: CPT

## 2025-07-23 PROCEDURE — 0202U NFCT DS 22 TRGT SARS-COV-2: CPT

## 2025-07-23 RX ORDER — ONDANSETRON 4 MG/1
4 TABLET, ORALLY DISINTEGRATING ORAL EVERY 6 HOURS PRN
Status: DISCONTINUED | OUTPATIENT
Start: 2025-07-23 | End: 2025-07-25 | Stop reason: HOSPADM

## 2025-07-23 RX ORDER — BISACODYL 10 MG
10 SUPPOSITORY, RECTAL RECTAL DAILY PRN
Status: DISCONTINUED | OUTPATIENT
Start: 2025-07-23 | End: 2025-07-25 | Stop reason: HOSPADM

## 2025-07-23 RX ORDER — SODIUM CHLORIDE 9 MG/ML
40 INJECTION, SOLUTION INTRAVENOUS AS NEEDED
Status: DISCONTINUED | OUTPATIENT
Start: 2025-07-23 | End: 2025-07-25 | Stop reason: HOSPADM

## 2025-07-23 RX ORDER — LEVOTHYROXINE SODIUM 25 UG/1
25 TABLET ORAL
Status: DISCONTINUED | OUTPATIENT
Start: 2025-07-24 | End: 2025-07-25 | Stop reason: HOSPADM

## 2025-07-23 RX ORDER — ACETAMINOPHEN 325 MG/1
650 TABLET ORAL EVERY 6 HOURS PRN
Status: DISCONTINUED | OUTPATIENT
Start: 2025-07-23 | End: 2025-07-24

## 2025-07-23 RX ORDER — SODIUM CHLORIDE 0.9 % (FLUSH) 0.9 %
10 SYRINGE (ML) INJECTION AS NEEDED
Status: DISCONTINUED | OUTPATIENT
Start: 2025-07-23 | End: 2025-07-25 | Stop reason: HOSPADM

## 2025-07-23 RX ORDER — ACETAMINOPHEN 500 MG
1000 TABLET ORAL ONCE
Status: COMPLETED | OUTPATIENT
Start: 2025-07-23 | End: 2025-07-23

## 2025-07-23 RX ORDER — IBUPROFEN 600 MG/1
1 TABLET ORAL
Status: DISCONTINUED | OUTPATIENT
Start: 2025-07-23 | End: 2025-07-25 | Stop reason: HOSPADM

## 2025-07-23 RX ORDER — BISACODYL 5 MG/1
5 TABLET, DELAYED RELEASE ORAL DAILY PRN
Status: DISCONTINUED | OUTPATIENT
Start: 2025-07-23 | End: 2025-07-25 | Stop reason: HOSPADM

## 2025-07-23 RX ORDER — NICOTINE POLACRILEX 4 MG
15 LOZENGE BUCCAL
Status: DISCONTINUED | OUTPATIENT
Start: 2025-07-23 | End: 2025-07-25 | Stop reason: HOSPADM

## 2025-07-23 RX ORDER — INSULIN LISPRO 100 [IU]/ML
2-7 INJECTION, SOLUTION INTRAVENOUS; SUBCUTANEOUS
Status: DISCONTINUED | OUTPATIENT
Start: 2025-07-23 | End: 2025-07-25 | Stop reason: HOSPADM

## 2025-07-23 RX ORDER — LORAZEPAM 2 MG/ML
1 INJECTION INTRAMUSCULAR ONCE
Status: COMPLETED | OUTPATIENT
Start: 2025-07-23 | End: 2025-07-23

## 2025-07-23 RX ORDER — ROSUVASTATIN CALCIUM 10 MG/1
10 TABLET, COATED ORAL DAILY
Status: DISCONTINUED | OUTPATIENT
Start: 2025-07-24 | End: 2025-07-25 | Stop reason: HOSPADM

## 2025-07-23 RX ORDER — POLYETHYLENE GLYCOL 3350 17 G/17G
17 POWDER, FOR SOLUTION ORAL DAILY PRN
Status: DISCONTINUED | OUTPATIENT
Start: 2025-07-23 | End: 2025-07-25 | Stop reason: HOSPADM

## 2025-07-23 RX ORDER — VANCOMYCIN 2 GRAM/500 ML IN 0.9 % SODIUM CHLORIDE INTRAVENOUS
2000 ONCE
Status: COMPLETED | OUTPATIENT
Start: 2025-07-23 | End: 2025-07-24

## 2025-07-23 RX ORDER — LORAZEPAM 2 MG/ML
1 INJECTION INTRAMUSCULAR EVERY 4 HOURS PRN
Status: DISCONTINUED | OUTPATIENT
Start: 2025-07-23 | End: 2025-07-23 | Stop reason: SDUPTHER

## 2025-07-23 RX ORDER — KETOROLAC TROMETHAMINE 30 MG/ML
15 INJECTION, SOLUTION INTRAMUSCULAR; INTRAVENOUS ONCE
Status: COMPLETED | OUTPATIENT
Start: 2025-07-23 | End: 2025-07-23

## 2025-07-23 RX ORDER — NITROGLYCERIN 0.4 MG/1
0.4 TABLET SUBLINGUAL
Status: DISCONTINUED | OUTPATIENT
Start: 2025-07-23 | End: 2025-07-25 | Stop reason: HOSPADM

## 2025-07-23 RX ORDER — AMOXICILLIN 250 MG
2 CAPSULE ORAL 2 TIMES DAILY PRN
Status: DISCONTINUED | OUTPATIENT
Start: 2025-07-23 | End: 2025-07-25 | Stop reason: HOSPADM

## 2025-07-23 RX ORDER — DULOXETIN HYDROCHLORIDE 30 MG/1
60 CAPSULE, DELAYED RELEASE ORAL EVERY 12 HOURS SCHEDULED
Status: DISCONTINUED | OUTPATIENT
Start: 2025-07-23 | End: 2025-07-25 | Stop reason: HOSPADM

## 2025-07-23 RX ORDER — SODIUM CHLORIDE, SODIUM LACTATE, POTASSIUM CHLORIDE, CALCIUM CHLORIDE 600; 310; 30; 20 MG/100ML; MG/100ML; MG/100ML; MG/100ML
75 INJECTION, SOLUTION INTRAVENOUS CONTINUOUS
Status: DISPENSED | OUTPATIENT
Start: 2025-07-23 | End: 2025-07-24

## 2025-07-23 RX ORDER — SODIUM CHLORIDE 0.9 % (FLUSH) 0.9 %
10 SYRINGE (ML) INJECTION EVERY 12 HOURS SCHEDULED
Status: DISCONTINUED | OUTPATIENT
Start: 2025-07-23 | End: 2025-07-25 | Stop reason: HOSPADM

## 2025-07-23 RX ORDER — ONDANSETRON 2 MG/ML
4 INJECTION INTRAMUSCULAR; INTRAVENOUS EVERY 6 HOURS PRN
Status: DISCONTINUED | OUTPATIENT
Start: 2025-07-23 | End: 2025-07-25 | Stop reason: HOSPADM

## 2025-07-23 RX ORDER — ONDANSETRON 2 MG/ML
4 INJECTION INTRAMUSCULAR; INTRAVENOUS ONCE
Status: COMPLETED | OUTPATIENT
Start: 2025-07-23 | End: 2025-07-23

## 2025-07-23 RX ORDER — ENOXAPARIN SODIUM 100 MG/ML
40 INJECTION SUBCUTANEOUS DAILY
Status: DISCONTINUED | OUTPATIENT
Start: 2025-07-24 | End: 2025-07-25 | Stop reason: HOSPADM

## 2025-07-23 RX ORDER — LORAZEPAM 2 MG/ML
1 INJECTION INTRAMUSCULAR EVERY 4 HOURS PRN
Status: DISCONTINUED | OUTPATIENT
Start: 2025-07-23 | End: 2025-07-23

## 2025-07-23 RX ORDER — OXYCODONE HYDROCHLORIDE 5 MG/1
5 TABLET ORAL EVERY 4 HOURS PRN
Refills: 0 | Status: DISCONTINUED | OUTPATIENT
Start: 2025-07-23 | End: 2025-07-25 | Stop reason: HOSPADM

## 2025-07-23 RX ORDER — MORPHINE SULFATE 2 MG/ML
2 INJECTION, SOLUTION INTRAMUSCULAR; INTRAVENOUS EVERY 4 HOURS PRN
Status: DISCONTINUED | OUTPATIENT
Start: 2025-07-23 | End: 2025-07-25 | Stop reason: HOSPADM

## 2025-07-23 RX ORDER — DEXTROSE MONOHYDRATE 25 G/50ML
25 INJECTION, SOLUTION INTRAVENOUS
Status: DISCONTINUED | OUTPATIENT
Start: 2025-07-23 | End: 2025-07-25 | Stop reason: HOSPADM

## 2025-07-23 RX ADMIN — SODIUM CHLORIDE 1000 ML: 9 INJECTION, SOLUTION INTRAVENOUS at 16:35

## 2025-07-23 RX ADMIN — SULFUR HEXAFLUORIDE 2 ML: KIT at 22:03

## 2025-07-23 RX ADMIN — INSULIN LISPRO 2 UNITS: 100 INJECTION, SOLUTION INTRAVENOUS; SUBCUTANEOUS at 22:40

## 2025-07-23 RX ADMIN — DULOXETINE 60 MG: 30 CAPSULE, DELAYED RELEASE ORAL at 22:40

## 2025-07-23 RX ADMIN — ACETAMINOPHEN 1000 MG: 500 TABLET ORAL at 15:17

## 2025-07-23 RX ADMIN — ONDANSETRON 4 MG: 2 INJECTION, SOLUTION INTRAMUSCULAR; INTRAVENOUS at 19:27

## 2025-07-23 RX ADMIN — SODIUM CHLORIDE 1000 ML: 9 INJECTION, SOLUTION INTRAVENOUS at 15:32

## 2025-07-23 RX ADMIN — Medication 10 ML: at 22:42

## 2025-07-23 RX ADMIN — LORAZEPAM 1 MG: 2 INJECTION INTRAMUSCULAR; INTRAVENOUS at 19:02

## 2025-07-23 RX ADMIN — SODIUM CHLORIDE 1000 ML: 9 INJECTION, SOLUTION INTRAVENOUS at 19:00

## 2025-07-23 RX ADMIN — PIPERACILLIN AND TAZOBACTAM 3.38 G: 3; .375 INJECTION, POWDER, FOR SOLUTION INTRAVENOUS at 22:40

## 2025-07-23 RX ADMIN — LORAZEPAM 1 MG: 2 INJECTION INTRAMUSCULAR; INTRAVENOUS at 16:35

## 2025-07-23 RX ADMIN — KETOROLAC TROMETHAMINE 15 MG: 30 INJECTION INTRAMUSCULAR; INTRAVENOUS at 15:33

## 2025-07-23 RX ADMIN — Medication 2000 MG: at 23:30

## 2025-07-23 RX ADMIN — SODIUM CHLORIDE, POTASSIUM CHLORIDE, SODIUM LACTATE AND CALCIUM CHLORIDE 75 ML/HR: 600; 310; 30; 20 INJECTION, SOLUTION INTRAVENOUS at 22:43

## 2025-07-23 RX ADMIN — MORPHINE SULFATE 2 MG: 2 INJECTION, SOLUTION INTRAMUSCULAR; INTRAVENOUS at 22:41

## 2025-07-23 NOTE — FSED PROVIDER NOTE
Subjective   History of Present Illness  49-year-old male reports having surgery on his right patella 1 month ago reports that he went on vacation last week to [place] reports that over the last 3 to 4 days he has had a headache body aches and cold chills.  He reports that the swelling to his right knee is decreasing he denies any known source of his body aches and chills, he is denying runny nose pain or pressure to his ears cough he is denying abdominal pain.        Review of Systems   All other systems reviewed and are negative.      Past Medical History:   Diagnosis Date    Abscess of leg, right 08/02/2017    Acute pharyngitis 03/15/2015    B12 deficiency 06/22/2018    Cellulitis 11/14/2014    Edema     Elbow pain 03/02/2015    Foot pain, left 09/20/2018    Gout 11/11/2014    Headache 02/21/2014    Hernia of abdominal cavity 03/02/2015    Hypertension 11/11/2014    Hypothyroidism 10/26/2017    Knee pain, right 04/15/2017    Laceration without foreign body of unspecified toe without damage to nail, initial encounter 06/22/2018    Leg pain, left     Lipoma of skin     Metatarsalgia, left foot 02/21/2019    Myalgia 10/26/2017    Obesity 02/21/2014    Obstructive sleep apnea 02/21/2014    Osteoarthritis 02/21/2014    Paresthesia 01/28/2016    Peripheral neuropathy 10/26/2017    Plantar fasciitis of right foot 01/28/2016    Pre-diabetes 06/22/2018    Renal insufficiency 06/22/2018    RUQ pain 06/01/2017    Sinusitis, acute 12/04/2015    Skin lesion 03/02/2015    Snoring     Sprain of tarsometatarsal ligament of left foot 01/11/2019    INITIAL ENCOUNTER    Stomatitis and mucositis 03/15/2015    (ULCERATIVE)    Unspecified fracture of left foot, initial encounter for closed fracture 09/20/2018    URI (upper respiratory infection) 01/15/2015    Vitamin B deficiency, unspecified 02/12/2016       Allergies   Allergen Reactions    Lyrica [Pregabalin] Other (See Comments)     Chest pain       Past Surgical History:   Procedure  Laterality Date    GALLBLADDER SURGERY      HERNIA REPAIR      KNEE SURGERY Right     PILONIDAL CYSTECTOMY         Family History   Problem Relation Age of Onset    Cancer Mother     Hypertension Father     Osteoarthritis Father     No Known Problems Sister     Diabetes Brother        Social History     Socioeconomic History    Marital status:     Number of children: 1   Tobacco Use    Smoking status: Never     Passive exposure: Never    Smokeless tobacco: Current     Types: Chew   Vaping Use    Vaping status: Never Used   Substance and Sexual Activity    Alcohol use: Not Currently     Comment: sober since 2010    Drug use: Never    Sexual activity: Defer           Objective   Physical Exam  Vitals and nursing note reviewed.   Constitutional:       General: He is in acute distress (Shaking).      Appearance: Normal appearance. He is not toxic-appearing.   HENT:      Head: Normocephalic and atraumatic.      Right Ear: Tympanic membrane and ear canal normal.      Left Ear: Tympanic membrane and ear canal normal.      Nose: Nose normal.      Mouth/Throat:      Mouth: Mucous membranes are dry.      Pharynx: Oropharynx is clear.   Eyes:      Extraocular Movements: Extraocular movements intact.      Conjunctiva/sclera: Conjunctivae normal.      Pupils: Pupils are equal, round, and reactive to light.   Cardiovascular:      Rate and Rhythm: Normal rate.      Pulses: Normal pulses.   Pulmonary:      Effort: Pulmonary effort is normal. No respiratory distress.      Breath sounds: Normal breath sounds. No stridor. No wheezing, rhonchi or rales.   Chest:      Chest wall: No tenderness.   Abdominal:      General: Abdomen is flat. Bowel sounds are normal. There is no distension.      Palpations: Abdomen is soft. There is no mass.      Tenderness: There is no abdominal tenderness. There is no right CVA tenderness, left CVA tenderness, guarding or rebound.      Hernia: No hernia is present.      Comments: I have auscultated  the patient's abdomen for bowel sounds they are present in all 4 quadrant    Patient reports his gallbladder has been removed.  I have palpated all 4 quadrants there is no rebound tenderness    The abdomen is soft nontender   Musculoskeletal:         General: Swelling (Right knee swelling) present.      Cervical back: Normal range of motion and neck supple.      Comments: The patient's right knee is swollen the surgical incision shows no evidence of infection    Patient reports that the swelling to the right knee is actually decreasing over the last month    There is no redness to the right knee there is minimal warmth but the knee is not hot    Patient is able to lift his leg and flex the right knee without pain   Skin:     General: Skin is warm.      Capillary Refill: Capillary refill takes less than 2 seconds.   Neurological:      General: No focal deficit present.      Mental Status: He is alert and oriented to person, place, and time. Mental status is at baseline.         Procedures           ED Course  ED Course as of 07/23/25 1942 Wed Jul 23, 2025   1545 CBC is negative for leukocytosis hemoglobin 14.1 patient's neutrophil count is elevated 90.6. [WF]   1546 COVID influenza not detected [WF]   1557 Lactic acid elevated at 3.0 [WF]   1559 Chest x-ray  Impression:  No acute cardiopulmonary findings.    [WF]   1559 Right knee x-ray  Impression:  Status post patellar resurfacing.     Mild to moderate prepatellar soft tissue swelling.     No acute osseous abnormality   [WF]   1620 Urinalysis shows trace blood trace ketones protein greater than 300 [WF]      ED Course User Index  [WF] Westley Laird Jr., APRN                                           Medical Decision Making  Differential diagnosis would include acute viral syndrome, UTI, pneumonia, right knee septic joint    Patient is shivering, he is tachycardic at 112 he is afebrile.  Will move forward with full lab panel including blood cultures lactic  acid CBC CMP.    1 L normal saline, will give 1 g Tylenol and Toradol for body ache.    CBC is negative for leukocytosis, lactic acid is elevated 3.0, chest x-ray is negative for acute findings, right knee x-ray is negative for right knee effusion.    Patient has received nearly 1 L of normal saline of consulted with my attending physician Dr. Hernandez.  Added 1 more liter normal saline patient admits to drinking a significant amount of alcohol over his birthday weekend, will add 1 mg Ativan.    Patient is on metformin he and his wife indicate that metformin has caused problems in the past with his lactic acid level.  Per question further on this matter they are unable to give me specific details.    After second liter of normal saline if patient is still tachycardic we will seek admission to [facility] via hospitalist service for observation    Patient has received an additional liter of normal saline and his heart rate remains in the 120s, he has received 1 mg Ativan and is now resting comfortably.  Given elevated lactate level and continued tachycardia had patient's early presentation of headache along with rigors we will seek admission for observation overnight to [facility] through hospitalist service.    [facility] Montenegro.  Updated the patient and his wife on this finding they are agreeable to admission    Amount and/or Complexity of Data Reviewed  Labs: ordered.  Radiology: ordered.  ECG/medicine tests: ordered.    Risk  OTC drugs.  Prescription drug management.  Decision regarding hospitalization.        Final diagnoses:   None       ED Disposition  ED Disposition       ED Disposition   Decision to Admit    Condition   --    Comment   Level of Care: Telemetry [5]   Diagnosis: Tachycardia [375137]   Admitting Physician: PIERCE MONTENEGRO [[id number]]   Attending Physician: PIERCE MONTENEGRO [[id number]]                 No follow-up provider  specified.       Medication List      No changes were made to your prescriptions during this visit.          injection  Infuse 1 mL into a venous catheter Every 4 (Four) Hours As Needed for Severe Pain for up to 3 days.  Ask about: Should I take this medication?     PHARMACY TO DOSE VANCOMYCIN  Use Continuous As Needed (septic arthritis) for up to 3 days. Indications: Bacteria in the Blood, Infection of the Skin and/or Soft Tissue  Ask about: Should I take this medication?     vancomycin 1250 mg in sodium chloride 0.9% 250 mL (CD)  Infuse 1,250 mg into a venous catheter Every 12 (Twelve) Hours for 8 doses. Indications: Empiric  Ask about: Should I take this medication?               Where to Get Your Medications        These medications were sent to [facility] [id number] - [place], [place] - [address] - [phone]  - [phone] [address] [place] [address]      Phone: [phone]   levothyroxine 25 MCG tablet       Information about where to get these medications is not yet available    Ask your nurse or doctor about these medications  cefTRIAXone 2,000 mg in sodium chloride 0.9 % 100 mL IVPB  diazePAM 10 MG tablet  DULoxetine 60 MG capsule  enoxaparin sodium 40 MG/0.4ML solution prefilled syringe syringe  morphine 2 MG/ML injection  PHARMACY TO DOSE VANCOMYCIN  thiamine 100 MG tablet  vancomycin 1250 mg in sodium chloride 0.9% 250 mL (CD)

## 2025-07-23 NOTE — ED NOTES
Pt comes in today complaining of headache and bodyaches x 4 days. Pt states he feels terrible. Wife at bedside. Pt states he had right knee surgery 4 wks ago. Pt also states he has neuropathy in BLE and at baseline he says he can't feel his lower legs or feet.

## 2025-07-24 ENCOUNTER — APPOINTMENT (OUTPATIENT)
Dept: INTERVENTIONAL RADIOLOGY/VASCULAR | Facility: HOSPITAL | Age: 50
End: 2025-07-24
Payer: COMMERCIAL

## 2025-07-24 ENCOUNTER — APPOINTMENT (OUTPATIENT)
Dept: CT IMAGING | Facility: HOSPITAL | Age: 50
End: 2025-07-24
Payer: COMMERCIAL

## 2025-07-24 DIAGNOSIS — E03.9 ACQUIRED HYPOTHYROIDISM: ICD-10-CM

## 2025-07-24 LAB
ALBUMIN SERPL-MCNC: 4.6 G/DL (ref 3.5–5.2)
ALBUMIN/GLOB SERPL: 1.4 G/DL
ALP SERPL-CCNC: 87 U/L (ref 39–117)
ALT SERPL W P-5'-P-CCNC: 37 U/L (ref 1–41)
ANION GAP SERPL CALCULATED.3IONS-SCNC: 14 MMOL/L (ref 5–15)
ANION GAP SERPL CALCULATED.3IONS-SCNC: 14.4 MMOL/L (ref 5–15)
AORTIC DIMENSIONLESS INDEX: 0.67 (DI)
APPEARANCE CSF: CLEAR
APPEARANCE FLD: ABNORMAL
APTT PPP: 37 SECONDS (ref 22.7–35.4)
AST SERPL-CCNC: 35 U/L (ref 1–40)
AV MEAN PRESS GRAD SYS DOP V1V2: 9 MMHG
AV VMAX SYS DOP: 199 CM/SEC
BACTERIA BLD CULT: ABNORMAL
BH CV ECHO MEAS - ACS: 2.2 CM
BH CV ECHO MEAS - AO MAX PG: 15.8 MMHG
BH CV ECHO MEAS - AO V2 VTI: 25.9 CM
BH CV ECHO MEAS - AVA(I,D): 2.1 CM2
BH CV ECHO MEAS - EDV(CUBED): 97.3 ML
BH CV ECHO MEAS - EDV(MOD-SP4): 118 ML
BH CV ECHO MEAS - EF(MOD-SP4): 61.6 %
BH CV ECHO MEAS - ESV(CUBED): 29.8 ML
BH CV ECHO MEAS - ESV(MOD-SP4): 45.3 ML
BH CV ECHO MEAS - FS: 32.6 %
BH CV ECHO MEAS - IVS/LVPW: 1 CM
BH CV ECHO MEAS - IVSD: 0.9 CM
BH CV ECHO MEAS - LA DIMENSION: 3.7 CM
BH CV ECHO MEAS - LAT PEAK E' VEL: 18.4 CM/SEC
BH CV ECHO MEAS - LV DIASTOLIC VOL/BSA (35-75): 261.6 CM2
BH CV ECHO MEAS - LV MASS(C)D: 137.7 GRAMS
BH CV ECHO MEAS - LV MAX PG: 6.5 MMHG
BH CV ECHO MEAS - LV MEAN PG: 4 MMHG
BH CV ECHO MEAS - LV SYSTOLIC VOL/BSA (12-30): 100.4 CM2
BH CV ECHO MEAS - LV V1 MAX: 127 CM/SEC
BH CV ECHO MEAS - LV V1 VTI: 17.3 CM
BH CV ECHO MEAS - LVIDD: 4.6 CM
BH CV ECHO MEAS - LVIDS: 3.1 CM
BH CV ECHO MEAS - LVOT AREA: 3.1 CM2
BH CV ECHO MEAS - LVOT DIAM: 2 CM
BH CV ECHO MEAS - LVPWD: 0.9 CM
BH CV ECHO MEAS - MED PEAK E' VEL: 14.7 CM/SEC
BH CV ECHO MEAS - MR MAX PG: 53.9 MMHG
BH CV ECHO MEAS - MR MAX VEL: 367 CM/SEC
BH CV ECHO MEAS - MV A DUR: 0.09 SEC
BH CV ECHO MEAS - MV A MAX VEL: 124 CM/SEC
BH CV ECHO MEAS - MV DEC SLOPE: 1139 CM/SEC2
BH CV ECHO MEAS - MV DEC TIME: 0.14 SEC
BH CV ECHO MEAS - MV E MAX VEL: 114 CM/SEC
BH CV ECHO MEAS - MV E/A: 0.92
BH CV ECHO MEAS - MV MAX PG: 7.1 MMHG
BH CV ECHO MEAS - MV MEAN PG: 4 MMHG
BH CV ECHO MEAS - MV P1/2T: 33.2 MSEC
BH CV ECHO MEAS - MV V2 VTI: 27.5 CM
BH CV ECHO MEAS - MVA(P1/2T): 6.6 CM2
BH CV ECHO MEAS - MVA(VTI): 1.98 CM2
BH CV ECHO MEAS - PA ACC TIME: 0.16 SEC
BH CV ECHO MEAS - PA V2 MAX: 154 CM/SEC
BH CV ECHO MEAS - RV MAX PG: 3 MMHG
BH CV ECHO MEAS - RV V1 MAX: 86.2 CM/SEC
BH CV ECHO MEAS - RV V1 VTI: 15.6 CM
BH CV ECHO MEAS - RVDD: 3.4 CM
BH CV ECHO MEAS - SV(LVOT): 54.3 ML
BH CV ECHO MEAS - SV(MOD-SP4): 72.7 ML
BH CV ECHO MEAS - SVI(LVOT): 120.5 ML/M2
BH CV ECHO MEAS - SVI(MOD-SP4): 161.2 ML/M2
BH CV ECHO MEAS - TAPSE (>1.6): 2.5 CM
BH CV ECHO MEAS - TR MAX PG: 13.7 MMHG
BH CV ECHO MEAS - TR MAX VEL: 185 CM/SEC
BH CV ECHO MEASUREMENTS AVERAGE E/E' RATIO: 6.89
BH CV XLRA - RV BASE: 3.8 CM
BH CV XLRA - RV LENGTH: 7.7 CM
BH CV XLRA - RV MID: 3.2 CM
BH CV XLRA - TDI S': 14.4 CM/SEC
BILIRUB SERPL-MCNC: 0.6 MG/DL (ref 0–1.2)
BOTTLE TYPE: ABNORMAL
BUN SERPL-MCNC: 11.9 MG/DL (ref 6–20)
BUN SERPL-MCNC: 9.5 MG/DL (ref 6–20)
BUN/CREAT SERPL: 10.9 (ref 7–25)
BUN/CREAT SERPL: 13.5 (ref 7–25)
C GATTII+NEOFOR DNA CSF QL NAA+NON-PROBE: NOT DETECTED
CALCIUM SPEC-SCNC: 10.1 MG/DL (ref 8.6–10.5)
CALCIUM SPEC-SCNC: 8.5 MG/DL (ref 8.6–10.5)
CHLORIDE SERPL-SCNC: 94 MMOL/L (ref 98–107)
CHLORIDE SERPL-SCNC: 96 MMOL/L (ref 98–107)
CMV DNA CSF QL NAA+PROBE: NOT DETECTED
CO2 SERPL-SCNC: 17.6 MMOL/L (ref 22–29)
CO2 SERPL-SCNC: 24 MMOL/L (ref 22–29)
COLOR CSF: COLORLESS
COLOR FLD: ABNORMAL
CREAT SERPL-MCNC: 0.87 MG/DL (ref 0.76–1.27)
CREAT SERPL-MCNC: 0.88 MG/DL (ref 0.76–1.27)
CRP SERPL-MCNC: 39.2 MG/DL (ref 0–0.5)
CRYSTALS FLD MICRO: NORMAL
D-LACTATE SERPL-SCNC: 2 MMOL/L (ref 0.5–2)
DEPRECATED RDW RBC AUTO: 43.3 FL (ref 37–54)
E COLI K1 DNA CSF QL NAA+NON-PROBE: NOT DETECTED
EGFRCR SERPLBLD CKD-EPI 2021: 105.4 ML/MIN/1.73
EGFRCR SERPLBLD CKD-EPI 2021: 105.8 ML/MIN/1.73
ERYTHROCYTE [DISTWIDTH] IN BLOOD BY AUTOMATED COUNT: 13.1 % (ref 12.3–15.4)
EV RNA CSF QL NAA+PROBE: NOT DETECTED
GLOBULIN UR ELPH-MCNC: 3.3 GM/DL
GLUCOSE BLDC GLUCOMTR-MCNC: 126 MG/DL (ref 70–105)
GLUCOSE BLDC GLUCOMTR-MCNC: 132 MG/DL (ref 70–105)
GLUCOSE BLDC GLUCOMTR-MCNC: 138 MG/DL (ref 70–105)
GLUCOSE BLDC GLUCOMTR-MCNC: 165 MG/DL (ref 70–105)
GLUCOSE CSF-MCNC: 98 MG/DL (ref 40–70)
GLUCOSE FLD-MCNC: <2 MG/DL
GLUCOSE SERPL-MCNC: 175 MG/DL (ref 65–99)
GLUCOSE SERPL-MCNC: 204 MG/DL (ref 65–99)
GP B STREP DNA SPEC QL NAA+PROBE: NOT DETECTED
HAEM INFLU SEROTYP DNA SPEC NAA+PROBE: NOT DETECTED
HBA1C MFR BLD: 6.61 % (ref 4.8–5.6)
HCT VFR BLD AUTO: 33.2 % (ref 37.5–51)
HGB BLD-MCNC: 11.1 G/DL (ref 13–17.7)
HHV6 DNA CSF QL NAA+PROBE: NOT DETECTED
HOLD SPECIMEN: NORMAL
HSV1 DNA CSF QL NAA+PROBE: NOT DETECTED
HSV2 DNA CSF QL NAA+PROBE: NOT DETECTED
INR PPP: 1.38 (ref 0.9–1.1)
L MONOCYTOG RRNA SPEC QL PROBE: NOT DETECTED
LDH FLD-CCNC: >2500 U/L
LEFT ATRIUM VOLUME INDEX: 125.8 ML/M2
LYMPHOCYTES # BLD MANUAL: 0.34 10*3/MM3 (ref 0.7–3.1)
LYMPHOCYTES NFR BLD MANUAL: 1 % (ref 5–12)
LYMPHOCYTES NFR FLD MANUAL: 9 %
MCH RBC QN AUTO: 30.2 PG (ref 26.6–33)
MCHC RBC AUTO-ENTMCNC: 33.4 G/DL (ref 31.5–35.7)
MCV RBC AUTO: 90.5 FL (ref 79–97)
MESOTHL CELL NFR FLD MANUAL: 2 %
METAMYELOCYTES NFR BLD MANUAL: 3 % (ref 0–0)
METHOD: NORMAL
MONOCYTES # BLD: 0.11 10*3/MM3 (ref 0.1–0.9)
MONOCYTES NFR FLD: 36 %
MRSA DNA SPEC QL NAA+PROBE: NORMAL
N MEN DNA SPEC QL NAA+PROBE: NOT DETECTED
NEUTROPHILS # BLD AUTO: 10.43 10*3/MM3 (ref 1.7–7)
NEUTROPHILS NFR BLD MANUAL: 54 % (ref 42.7–76)
NEUTROPHILS NFR FLD MANUAL: 53 %
NEUTS BAND NFR BLD MANUAL: 39 % (ref 0–5)
NEUTS VAC BLD QL SMEAR: ABNORMAL
NUC CELL # CSF MANUAL: 0 /MM3 (ref 0–5)
NUC CELL # FLD: ABNORMAL /MM3
PARECHOVIRUS A RNA CSF QL NAA+NON-PROBE: NOT DETECTED
PH FLD: 6.5 [PH] (ref 6.5–7.5)
PLATELET # BLD AUTO: 161 10*3/MM3 (ref 140–450)
PMV BLD AUTO: 9.4 FL (ref 6–12)
POTASSIUM SERPL-SCNC: 3.6 MMOL/L (ref 3.5–5.2)
POTASSIUM SERPL-SCNC: 3.8 MMOL/L (ref 3.5–5.2)
POTASSIUM SERPL-SCNC: 4 MMOL/L (ref 3.5–5.2)
PROT CSF-MCNC: 41 MG/DL (ref 15–45)
PROT FLD-MCNC: 4.8 G/DL
PROT SERPL-MCNC: 7.9 G/DL (ref 6–8.5)
PROTHROMBIN TIME: 17 SECONDS (ref 11.7–14.2)
QT INTERVAL: 305 MS
QTC INTERVAL: 444 MS
RBC # BLD AUTO: 3.67 10*6/MM3 (ref 4.14–5.8)
RBC # CSF MANUAL: 0 /MM3 (ref 0–5)
RBC MORPH BLD: NORMAL
S PNEUM DNA CSF QL NAA+NON-PROBE: NOT DETECTED
SCAN SLIDE: NORMAL
SINUS: 2.8 CM
SMALL PLATELETS BLD QL SMEAR: ADEQUATE
SODIUM SERPL-SCNC: 128 MMOL/L (ref 136–145)
SODIUM SERPL-SCNC: 132 MMOL/L (ref 136–145)
SPECIMEN VOL CSF: 2 ML
STJ: 2.7 CM
TSH SERPL DL<=0.05 MIU/L-ACNC: 1.42 UIU/ML (ref 0.27–4.2)
TUBE # CSF: 3
VARIANT LYMPHS NFR BLD MANUAL: 1 % (ref 0–5)
VARIANT LYMPHS NFR BLD MANUAL: 2 % (ref 19.6–45.3)
VZV DNA CSF QL NAA+PROBE: NOT DETECTED
WBC NRBC COR # BLD AUTO: 11.22 10*3/MM3 (ref 3.4–10.8)

## 2025-07-24 PROCEDURE — 86255 FLUORESCENT ANTIBODY SCREEN: CPT | Performed by: INTERNAL MEDICINE

## 2025-07-24 PROCEDURE — 25010000002 DIAZEPAM PER 5 MG: Performed by: INTERNAL MEDICINE

## 2025-07-24 PROCEDURE — 86140 C-REACTIVE PROTEIN: CPT | Performed by: INTERNAL MEDICINE

## 2025-07-24 PROCEDURE — 25010000002 CEFTRIAXONE PER 250 MG: Performed by: NURSE PRACTITIONER

## 2025-07-24 PROCEDURE — 89050 BODY FLUID CELL COUNT: CPT | Performed by: INTERNAL MEDICINE

## 2025-07-24 PROCEDURE — 85025 COMPLETE CBC W/AUTO DIFF WBC: CPT | Performed by: INTERNAL MEDICINE

## 2025-07-24 PROCEDURE — 87186 SC STD MICRODIL/AGAR DIL: CPT | Performed by: INTERNAL MEDICINE

## 2025-07-24 PROCEDURE — 84157 ASSAY OF PROTEIN OTHER: CPT | Performed by: NURSE PRACTITIONER

## 2025-07-24 PROCEDURE — 87206 SMEAR FLUORESCENT/ACID STAI: CPT | Performed by: INTERNAL MEDICINE

## 2025-07-24 PROCEDURE — 89051 BODY FLUID CELL COUNT: CPT | Performed by: INTERNAL MEDICINE

## 2025-07-24 PROCEDURE — 82945 GLUCOSE OTHER FLUID: CPT | Performed by: INTERNAL MEDICINE

## 2025-07-24 PROCEDURE — 25010000002 KETOROLAC TROMETHAMINE PER 15 MG: Performed by: INTERNAL MEDICINE

## 2025-07-24 PROCEDURE — 25010000002 ENOXAPARIN PER 10 MG: Performed by: INTERNAL MEDICINE

## 2025-07-24 PROCEDURE — 71260 CT THORAX DX C+: CPT

## 2025-07-24 PROCEDURE — 82945 GLUCOSE OTHER FLUID: CPT | Performed by: NURSE PRACTITIONER

## 2025-07-24 PROCEDURE — 96376 TX/PRO/DX INJ SAME DRUG ADON: CPT

## 2025-07-24 PROCEDURE — 87015 SPECIMEN INFECT AGNT CONCNTJ: CPT | Performed by: INTERNAL MEDICINE

## 2025-07-24 PROCEDURE — 85610 PROTHROMBIN TIME: CPT | Performed by: RADIOLOGY

## 2025-07-24 PROCEDURE — 96366 THER/PROPH/DIAG IV INF ADDON: CPT

## 2025-07-24 PROCEDURE — 87205 SMEAR GRAM STAIN: CPT | Performed by: INTERNAL MEDICINE

## 2025-07-24 PROCEDURE — 25510000001 IOPAMIDOL PER 1 ML: Performed by: INTERNAL MEDICINE

## 2025-07-24 PROCEDURE — 84132 ASSAY OF SERUM POTASSIUM: CPT | Performed by: INTERNAL MEDICINE

## 2025-07-24 PROCEDURE — 63710000001 INSULIN LISPRO (HUMAN) PER 5 UNITS: Performed by: INTERNAL MEDICINE

## 2025-07-24 PROCEDURE — 83615 LACTATE (LD) (LDH) ENZYME: CPT | Performed by: INTERNAL MEDICINE

## 2025-07-24 PROCEDURE — 25010000002 MORPHINE PER 10 MG: Performed by: INTERNAL MEDICINE

## 2025-07-24 PROCEDURE — 74177 CT ABD & PELVIS W/CONTRAST: CPT

## 2025-07-24 PROCEDURE — 87075 CULTR BACTERIA EXCEPT BLOOD: CPT | Performed by: INTERNAL MEDICINE

## 2025-07-24 PROCEDURE — 87116 MYCOBACTERIA CULTURE: CPT | Performed by: INTERNAL MEDICINE

## 2025-07-24 PROCEDURE — G0378 HOSPITAL OBSERVATION PER HR: HCPCS

## 2025-07-24 PROCEDURE — 99214 OFFICE O/P EST MOD 30 MIN: CPT | Performed by: NURSE PRACTITIONER

## 2025-07-24 PROCEDURE — 99221 1ST HOSP IP/OBS SF/LOW 40: CPT | Performed by: ORTHOPAEDIC SURGERY

## 2025-07-24 PROCEDURE — 83986 ASSAY PH BODY FLUID NOS: CPT | Performed by: INTERNAL MEDICINE

## 2025-07-24 PROCEDURE — 96372 THER/PROPH/DIAG INJ SC/IM: CPT

## 2025-07-24 PROCEDURE — 73701 CT LOWER EXTREMITY W/DYE: CPT

## 2025-07-24 PROCEDURE — 96367 TX/PROPH/DG ADDL SEQ IV INF: CPT

## 2025-07-24 PROCEDURE — 83605 ASSAY OF LACTIC ACID: CPT | Performed by: INTERNAL MEDICINE

## 2025-07-24 PROCEDURE — 25010000002 LIDOCAINE 1 % SOLUTION

## 2025-07-24 PROCEDURE — 85007 BL SMEAR W/DIFF WBC COUNT: CPT | Performed by: INTERNAL MEDICINE

## 2025-07-24 PROCEDURE — 80048 BASIC METABOLIC PNL TOTAL CA: CPT | Performed by: INTERNAL MEDICINE

## 2025-07-24 PROCEDURE — 86341 ISLET CELL ANTIBODY: CPT | Performed by: INTERNAL MEDICINE

## 2025-07-24 PROCEDURE — 89060 EXAM SYNOVIAL FLUID CRYSTALS: CPT | Performed by: INTERNAL MEDICINE

## 2025-07-24 PROCEDURE — 87070 CULTURE OTHR SPECIMN AEROBIC: CPT | Performed by: INTERNAL MEDICINE

## 2025-07-24 PROCEDURE — 87077 CULTURE AEROBIC IDENTIFY: CPT | Performed by: INTERNAL MEDICINE

## 2025-07-24 PROCEDURE — 25810000003 SODIUM CHLORIDE 0.9 % SOLUTION 250 ML FLEX CONT: Performed by: INTERNAL MEDICINE

## 2025-07-24 PROCEDURE — 85730 THROMBOPLASTIN TIME PARTIAL: CPT | Performed by: RADIOLOGY

## 2025-07-24 PROCEDURE — 84443 ASSAY THYROID STIM HORMONE: CPT | Performed by: INTERNAL MEDICINE

## 2025-07-24 PROCEDURE — 70470 CT HEAD/BRAIN W/O & W/DYE: CPT

## 2025-07-24 PROCEDURE — 25010000002 FENTANYL CITRATE (PF) 50 MCG/ML SOLUTION

## 2025-07-24 PROCEDURE — 96375 TX/PRO/DX INJ NEW DRUG ADDON: CPT

## 2025-07-24 PROCEDURE — 82948 REAGENT STRIP/BLOOD GLUCOSE: CPT | Performed by: INTERNAL MEDICINE

## 2025-07-24 PROCEDURE — 25010000002 CEFEPIME PER 500 MG: Performed by: INTERNAL MEDICINE

## 2025-07-24 PROCEDURE — 25010000002 VANCOMYCIN HCL 1.25 G RECONSTITUTED SOLUTION 1 EACH VIAL: Performed by: INTERNAL MEDICINE

## 2025-07-24 PROCEDURE — 87483 CNS DNA AMP PROBE TYPE 12-25: CPT | Performed by: INTERNAL MEDICINE

## 2025-07-24 PROCEDURE — 25010000002 PIPERACILLIN SOD-TAZOBACTAM PER 1 G: Performed by: INTERNAL MEDICINE

## 2025-07-24 PROCEDURE — 83036 HEMOGLOBIN GLYCOSYLATED A1C: CPT | Performed by: INTERNAL MEDICINE

## 2025-07-24 PROCEDURE — 76942 ECHO GUIDE FOR BIOPSY: CPT

## 2025-07-24 PROCEDURE — 25010000002 THIAMINE PER 100 MG: Performed by: INTERNAL MEDICINE

## 2025-07-24 PROCEDURE — 84157 ASSAY OF PROTEIN OTHER: CPT | Performed by: INTERNAL MEDICINE

## 2025-07-24 PROCEDURE — 82948 REAGENT STRIP/BLOOD GLUCOSE: CPT

## 2025-07-24 PROCEDURE — 87641 MR-STAPH DNA AMP PROBE: CPT | Performed by: INTERNAL MEDICINE

## 2025-07-24 RX ORDER — DIAZEPAM 10 MG/2ML
10 INJECTION, SOLUTION INTRAMUSCULAR; INTRAVENOUS
Status: DISCONTINUED | OUTPATIENT
Start: 2025-07-24 | End: 2025-07-25 | Stop reason: HOSPADM

## 2025-07-24 RX ORDER — IOPAMIDOL 755 MG/ML
100 INJECTION, SOLUTION INTRAVASCULAR
Status: COMPLETED | OUTPATIENT
Start: 2025-07-24 | End: 2025-07-24

## 2025-07-24 RX ORDER — DIAZEPAM 5 MG/1
20 TABLET ORAL
Status: DISCONTINUED | OUTPATIENT
Start: 2025-07-24 | End: 2025-07-25 | Stop reason: HOSPADM

## 2025-07-24 RX ORDER — LIDOCAINE HYDROCHLORIDE 10 MG/ML
INJECTION, SOLUTION INFILTRATION; PERINEURAL AS NEEDED
Status: COMPLETED | OUTPATIENT
Start: 2025-07-24 | End: 2025-07-24

## 2025-07-24 RX ORDER — DIAZEPAM 5 MG/1
15 TABLET ORAL
Status: DISCONTINUED | OUTPATIENT
Start: 2025-07-24 | End: 2025-07-25 | Stop reason: HOSPADM

## 2025-07-24 RX ORDER — IOPAMIDOL 755 MG/ML
50 INJECTION, SOLUTION INTRAVASCULAR
Status: COMPLETED | OUTPATIENT
Start: 2025-07-24 | End: 2025-07-24

## 2025-07-24 RX ORDER — THIAMINE HYDROCHLORIDE 100 MG/ML
200 INJECTION, SOLUTION INTRAMUSCULAR; INTRAVENOUS EVERY 8 HOURS SCHEDULED
Status: DISCONTINUED | OUTPATIENT
Start: 2025-07-24 | End: 2025-07-25 | Stop reason: HOSPADM

## 2025-07-24 RX ORDER — METRONIDAZOLE 500 MG/100ML
500 INJECTION, SOLUTION INTRAVENOUS EVERY 8 HOURS
Status: DISCONTINUED | OUTPATIENT
Start: 2025-07-24 | End: 2025-07-24

## 2025-07-24 RX ORDER — DIAZEPAM 10 MG/2ML
15 INJECTION, SOLUTION INTRAMUSCULAR; INTRAVENOUS
Status: DISCONTINUED | OUTPATIENT
Start: 2025-07-24 | End: 2025-07-25 | Stop reason: HOSPADM

## 2025-07-24 RX ORDER — FOLIC ACID 1 MG/1
1 TABLET ORAL DAILY
Status: DISCONTINUED | OUTPATIENT
Start: 2025-07-24 | End: 2025-07-25 | Stop reason: HOSPADM

## 2025-07-24 RX ORDER — DIAZEPAM 10 MG/2ML
20 INJECTION, SOLUTION INTRAMUSCULAR; INTRAVENOUS
Status: DISCONTINUED | OUTPATIENT
Start: 2025-07-24 | End: 2025-07-25 | Stop reason: HOSPADM

## 2025-07-24 RX ORDER — DIAZEPAM 5 MG/1
10 TABLET ORAL
Status: DISCONTINUED | OUTPATIENT
Start: 2025-07-24 | End: 2025-07-25 | Stop reason: HOSPADM

## 2025-07-24 RX ORDER — POTASSIUM CHLORIDE 1500 MG/1
40 TABLET, EXTENDED RELEASE ORAL EVERY 4 HOURS
Status: COMPLETED | OUTPATIENT
Start: 2025-07-24 | End: 2025-07-24

## 2025-07-24 RX ORDER — ACETAMINOPHEN 500 MG
1000 TABLET ORAL EVERY 6 HOURS PRN
Status: DISCONTINUED | OUTPATIENT
Start: 2025-07-24 | End: 2025-07-25 | Stop reason: HOSPADM

## 2025-07-24 RX ORDER — LEVOTHYROXINE SODIUM 25 UG/1
25 TABLET ORAL DAILY
Qty: 90 TABLET | Refills: 1 | Status: SHIPPED | OUTPATIENT
Start: 2025-07-24

## 2025-07-24 RX ORDER — FENTANYL CITRATE 50 UG/ML
INJECTION, SOLUTION INTRAMUSCULAR; INTRAVENOUS AS NEEDED
Status: DISCONTINUED | OUTPATIENT
Start: 2025-07-24 | End: 2025-07-25 | Stop reason: HOSPADM

## 2025-07-24 RX ORDER — KETOROLAC TROMETHAMINE 30 MG/ML
30 INJECTION, SOLUTION INTRAMUSCULAR; INTRAVENOUS ONCE
Status: COMPLETED | OUTPATIENT
Start: 2025-07-24 | End: 2025-07-24

## 2025-07-24 RX ORDER — LIDOCAINE HYDROCHLORIDE 10 MG/ML
INJECTION, SOLUTION INFILTRATION; PERINEURAL AS NEEDED
Status: DISCONTINUED | OUTPATIENT
Start: 2025-07-24 | End: 2025-07-25 | Stop reason: HOSPADM

## 2025-07-24 RX ADMIN — MUPIROCIN 1 APPLICATION: 20 OINTMENT TOPICAL at 06:12

## 2025-07-24 RX ADMIN — CEFTRIAXONE SODIUM 2000 MG: 2 INJECTION, POWDER, FOR SOLUTION INTRAMUSCULAR; INTRAVENOUS at 16:02

## 2025-07-24 RX ADMIN — THIAMINE HYDROCHLORIDE 200 MG: 100 INJECTION, SOLUTION INTRAMUSCULAR; INTRAVENOUS at 13:57

## 2025-07-24 RX ADMIN — Medication 10 ML: at 10:38

## 2025-07-24 RX ADMIN — DIAZEPAM 15 MG: 10 INJECTION, SOLUTION INTRAMUSCULAR; INTRAVENOUS at 23:36

## 2025-07-24 RX ADMIN — POTASSIUM CHLORIDE 40 MEQ: 1500 TABLET, EXTENDED RELEASE ORAL at 06:12

## 2025-07-24 RX ADMIN — MORPHINE SULFATE 2 MG: 2 INJECTION, SOLUTION INTRAMUSCULAR; INTRAVENOUS at 23:36

## 2025-07-24 RX ADMIN — DULOXETINE 60 MG: 30 CAPSULE, DELAYED RELEASE ORAL at 20:38

## 2025-07-24 RX ADMIN — IOPAMIDOL 100 ML: 755 INJECTION, SOLUTION INTRAVENOUS at 01:33

## 2025-07-24 RX ADMIN — VANCOMYCIN HYDROCHLORIDE 1250 MG: 1.25 INJECTION, POWDER, LYOPHILIZED, FOR SOLUTION INTRAVENOUS at 10:39

## 2025-07-24 RX ADMIN — THIAMINE HYDROCHLORIDE 200 MG: 100 INJECTION, SOLUTION INTRAMUSCULAR; INTRAVENOUS at 21:02

## 2025-07-24 RX ADMIN — PIPERACILLIN AND TAZOBACTAM 3.38 G: 3; .375 INJECTION, POWDER, FOR SOLUTION INTRAVENOUS at 05:26

## 2025-07-24 RX ADMIN — LEVOTHYROXINE SODIUM 25 MCG: 0.03 TABLET ORAL at 05:26

## 2025-07-24 RX ADMIN — CEFEPIME 2000 MG: 2 INJECTION, POWDER, FOR SOLUTION INTRAVENOUS at 12:44

## 2025-07-24 RX ADMIN — IOPAMIDOL 50 ML: 755 INJECTION, SOLUTION INTRAVENOUS at 14:09

## 2025-07-24 RX ADMIN — IOPAMIDOL 100 ML: 755 INJECTION, SOLUTION INTRAVENOUS at 10:19

## 2025-07-24 RX ADMIN — POTASSIUM CHLORIDE 40 MEQ: 1500 TABLET, EXTENDED RELEASE ORAL at 09:34

## 2025-07-24 RX ADMIN — DULOXETINE 60 MG: 30 CAPSULE, DELAYED RELEASE ORAL at 09:34

## 2025-07-24 RX ADMIN — MORPHINE SULFATE 2 MG: 2 INJECTION, SOLUTION INTRAMUSCULAR; INTRAVENOUS at 17:13

## 2025-07-24 RX ADMIN — INSULIN LISPRO 2 UNITS: 100 INJECTION, SOLUTION INTRAVENOUS; SUBCUTANEOUS at 12:45

## 2025-07-24 RX ADMIN — FENTANYL CITRATE 50 MCG: 50 INJECTION, SOLUTION INTRAMUSCULAR; INTRAVENOUS at 14:58

## 2025-07-24 RX ADMIN — ACETAMINOPHEN 1000 MG: 500 TABLET, FILM COATED ORAL at 23:36

## 2025-07-24 RX ADMIN — OXYCODONE 5 MG: 5 TABLET ORAL at 20:26

## 2025-07-24 RX ADMIN — OXYCODONE 5 MG: 5 TABLET ORAL at 02:08

## 2025-07-24 RX ADMIN — KETOROLAC TROMETHAMINE 30 MG: 30 INJECTION, SOLUTION INTRAMUSCULAR; INTRAVENOUS at 04:37

## 2025-07-24 RX ADMIN — ROSUVASTATIN CALCIUM 10 MG: 10 TABLET, FILM COATED ORAL at 09:34

## 2025-07-24 RX ADMIN — Medication 10 ML: at 20:27

## 2025-07-24 RX ADMIN — MUPIROCIN 1 APPLICATION: 20 OINTMENT TOPICAL at 20:26

## 2025-07-24 RX ADMIN — LIDOCAINE HYDROCHLORIDE 10 ML: 10 INJECTION, SOLUTION INFILTRATION; PERINEURAL at 14:26

## 2025-07-24 RX ADMIN — RIMEGEPANT SULFATE 75 MG: 75 TABLET, ORALLY DISINTEGRATING ORAL at 15:56

## 2025-07-24 RX ADMIN — VANCOMYCIN HYDROCHLORIDE 1250 MG: 1.25 INJECTION, POWDER, LYOPHILIZED, FOR SOLUTION INTRAVENOUS at 20:26

## 2025-07-24 RX ADMIN — FOLIC ACID 1 MG: 1 TABLET ORAL at 09:34

## 2025-07-24 RX ADMIN — LIDOCAINE HYDROCHLORIDE 10 ML: 10 INJECTION, SOLUTION INFILTRATION; PERINEURAL at 14:59

## 2025-07-24 RX ADMIN — ENOXAPARIN SODIUM 40 MG: 100 INJECTION SUBCUTANEOUS at 15:53

## 2025-07-24 RX ADMIN — ACETAMINOPHEN 1000 MG: 500 TABLET, FILM COATED ORAL at 09:34

## 2025-07-24 NOTE — H&P
[facility]  History & Physical    Patient Name: Timo Hector  : 1975  MRN: [id number]  Primary Care Physician:  Paras Worthy MD  Date of admission: 2025  Date and Time of Service: 2025 at 22:04 EDT    Subjective      Chief Complaint:   Chief Complaint   Patient presents with    Headache        History of Present Illness: Timo Hector is a 49 y.o. male with a CMH of alcoholism DM2, peripheral neuropathy, HTN, HLD who presented to [facility] on 2025 with fatigue and lightheadedness    -Patient states they have been feeling unwell since returning home a vacation about 2 to 3 days ago.  Admits to some chills and tremors.  Admits that he has had significant drinks while out there and normally drinks at least 30 pack/day beer.  Patient states that he did not count while he was drinking on vacation.  Admits to having some sunburn.    Vitals are significant for fever 102.7, tachycardia, tachypnea  Labs significant for mild hyperglycemia, hyponatremia, lactic acidosis.  UA negative for obvious signs of infection at this time.Chest x-ray did not show any obvious signs of pneumonia.  While in the ER patient was treated with ketorolac, Ativan, Zofran, 3 L bolus.  Review of Systems   Constitutional:  Positive for chills and fatigue. Negative for diaphoresis and fever.   HENT:  Negative for congestion, sneezing and sore throat.    Eyes:  Negative for visual disturbance.   Respiratory:  Negative for cough, chest tightness, shortness of breath and wheezing.    Cardiovascular:  Negative for chest pain, palpitations and leg swelling.   Gastrointestinal:  Negative for abdominal distention, abdominal pain, constipation, diarrhea, nausea and vomiting.   Genitourinary:  Negative for decreased urine volume, difficulty urinating, dysuria, frequency and urgency.   Musculoskeletal:  Negative for arthralgias, gait problem and myalgias.   Skin:  Negative for color  change, pallor and wound.   Neurological:  Positive for tremors, weakness and headaches. Negative for dizziness, syncope and light-headedness.   Psychiatric/Behavioral:  Negative for agitation, behavioral problems, confusion and decreased concentration.        Personal History     Past Medical History:   Diagnosis Date    Abscess of leg, right 08/02/2017    Acute pharyngitis 03/15/2015    B12 deficiency 06/22/2018    Cellulitis 11/14/2014    Edema     Elbow pain 03/02/2015    Foot pain, left 09/20/2018    Gout 11/11/2014    Headache 02/21/2014    Hernia of abdominal cavity 03/02/2015    Hypertension 11/11/2014    Hypothyroidism 10/26/2017    Knee pain, right 04/15/2017    Laceration without foreign body of unspecified toe without damage to nail, initial encounter 06/22/2018    Leg pain, left     Lipoma of skin     Metatarsalgia, left foot 02/21/2019    Myalgia 10/26/2017    Obesity 02/21/2014    Obstructive sleep apnea 02/21/2014    Osteoarthritis 02/21/2014    Paresthesia 01/28/2016    Peripheral neuropathy 10/26/2017    Plantar fasciitis of right foot 01/28/2016    Pre-diabetes 06/22/2018    Renal insufficiency 06/22/2018    RUQ pain 06/01/2017    Sinusitis, acute 12/04/2015    Skin lesion 03/02/2015    Snoring     Sprain of tarsometatarsal ligament of left foot 01/11/2019    INITIAL ENCOUNTER    Stomatitis and mucositis 03/15/2015    (ULCERATIVE)    Unspecified fracture of left foot, initial encounter for closed fracture 09/20/2018    URI (upper respiratory infection) 01/15/2015    Vitamin B deficiency, unspecified 02/12/2016       Past Surgical History:   Procedure Laterality Date    GALLBLADDER SURGERY      HERNIA REPAIR      KNEE SURGERY Right     PILONIDAL CYSTECTOMY         Family History: family history includes Cancer in his mother; Diabetes in his brother; Hypertension in his father; No Known Problems in his sister; Osteoarthritis in his father. Otherwise pertinent FHx was reviewed and not pertinent to  current issue.    Social History:  reports that he has never smoked. He has never been exposed to tobacco smoke. His smokeless tobacco use includes chew. He reports that he does not currently use alcohol. He reports that he does not use drugs.    Home Medications:  Prior to Admission Medications       Prescriptions Last Dose Informant Patient Reported? Taking?    cyanocobalamin injection 1,000 mcg   No No    DULoxetine HCl (DRIZALMA) 30 MG capsule   Yes No    Take 2 capsules by mouth 2 (Two) Times a Day.    levothyroxine (SYNTHROID, LEVOTHROID) 25 MCG tablet   No No    TAKE 1 TABLET BY MOUTH DAILY    lisinopril-hydrochlorothiazide (PRINZIDE,ZESTORETIC) 20-25 MG per tablet   No No    TAKE 1 TABLET BY MOUTH DAILY    metFORMIN ER (GLUCOPHAGE-XR) 500 MG 24 hr tablet   No No    Take 1 tablet by mouth Daily With Dinner for 180 days.    rosuvastatin (Crestor) 10 MG tablet   No No    Take 1 tablet by mouth Daily.              Allergies:  Allergies   Allergen Reactions    Lyrica [Pregabalin] Other (See Comments)     Chest pain       Objective      Vitals:   Temp:  [98.3 °F (36.8 °C)-102.7 °F (39.3 °C)] 102.7 °F (39.3 °C)  Heart Rate:  [112-134] 128  Resp:  [18-22] 20  BP: (111-149)/(55-88) 148/88  Body mass index is 32.99 kg/m².  Physical Exam  Vitals and nursing note reviewed.   Constitutional:       General: He is not in acute distress.     Appearance: Normal appearance. He is normal weight. He is not ill-appearing or toxic-appearing.   HENT:      Head: Normocephalic and atraumatic.      Nose: Nose normal.      Mouth/Throat:      Mouth: Mucous membranes are moist.      Pharynx: Oropharynx is clear.   Eyes:      General: No scleral icterus.     Extraocular Movements: Extraocular movements intact.      Conjunctiva/sclera: Conjunctivae normal.   Cardiovascular:      Rate and Rhythm: Normal rate and regular rhythm.      Pulses: Normal pulses.      Heart sounds: Normal heart sounds. No murmur heard.     No friction rub. No  gallop.   Pulmonary:      Effort: Pulmonary effort is normal. No respiratory distress.      Breath sounds: Normal breath sounds. No wheezing, rhonchi or rales.   Abdominal:      General: Abdomen is flat. Bowel sounds are normal. There is no distension.      Palpations: Abdomen is soft.      Tenderness: There is no abdominal tenderness. There is no guarding.   Musculoskeletal:         General: Normal range of motion.      Cervical back: Normal range of motion. No rigidity or tenderness.      Right lower leg: No edema.      Left lower leg: No edema.      Comments: Intention tremors noted   Skin:     General: Skin is warm.      Coloration: Skin is not jaundiced or pale.   Neurological:      General: No focal deficit present.      Mental Status: He is alert and oriented to person, place, and time. Mental status is at baseline.   Psychiatric:         Mood and Affect: Mood normal.         Behavior: Behavior normal.         Thought Content: Thought content normal.         Judgment: Judgment normal.         Diagnostic Data:  Lab Results (last 24 hours)       Procedure Component Value Units Date/Time    Urine Drug Screen - Urine, Clean Catch [[id number]]  (Abnormal) Collected: 07/23/25 2111    Specimen: Urine, Clean Catch Updated: 07/23/25 2141     THC, Screen, Urine Negative     Phencyclidine (PCP), Urine Negative     Cocaine Screen, Urine Negative     Methamphetamine, Ur Negative     Opiate Screen Negative     Amphetamine Screen, Urine Negative     Benzodiazepine Screen, Urine Positive     Tricyclic Antidepressants Screen Negative     Methadone Screen, Urine Negative     Barbiturates Screen, Urine Negative     Oxycodone Screen, Urine Negative     Buprenorphine, Screen, Urine Negative    Narrative:      Cutoff For Drugs Screened:    Amphetamines               500 ng/ml  Barbiturates               200 ng/ml  Benzodiazepines            150 ng/ml  Cocaine                    150 ng/ml  Methadone                  200  ng/ml  Opiates                    100 ng/ml  Phencyclidine               25 ng/ml  THC                         50 ng/ml  Methamphetamine            500 ng/ml  Tricyclic Antidepressants  300 ng/ml  Oxycodone                  100 ng/ml  Buprenorphine               10 ng/ml    The normal value for all drugs tested is negative. This report includes unconfirmed screening results, with the cutoff values listed, to be used for medical treatment purposes only.  Unconfirmed results must not be used for non-medical purposes such as employment or legal testing.  Clinical consideration should be applied to any drug of abuse test, particularly when unconfirmed results are used.    All urine drugs of abuse requests without chain of custody are for medical screening purposes only.  False positives are possible.      POC Glucose Once [[id number]]  (Abnormal) Collected: 07/23/25 2054    Specimen: Blood Updated: 07/23/25 2056     Glucose 186 mg/dL      Comment: Serial Number: [id number]:  [id number]       STAT Lactic Acid, Reflex [[id number]]  (Normal) Collected: 07/23/25 1730    Specimen: Blood Updated: 07/23/25 1755     Lactate 1.7 mmol/L     Comprehensive Metabolic Panel [[id number]]  (Abnormal) Collected: 07/23/25 1528    Specimen: Blood Updated: 07/23/25 1623     Glucose 204 mg/dL      BUN 11.9 mg/dL      Creatinine 0.88 mg/dL      Sodium 132 mmol/L      Potassium 3.8 mmol/L      Chloride 94 mmol/L      CO2 24.0 mmol/L      Calcium 10.1 mg/dL      Total Protein 7.9 g/dL      Albumin 4.6 g/dL      ALT (SGPT) 37 U/L      AST (SGOT) 35 U/L      Alkaline Phosphatase 87 U/L      Total Bilirubin 0.6 mg/dL      Globulin 3.3 gm/dL      A/G Ratio 1.4 g/dL      BUN/Creatinine Ratio 13.5     Anion Gap 14.0 mmol/L      eGFR 105.4 mL/min/1.73     Narrative:      GFR Categories in Chronic Kidney Disease (CKD)              GFR Category          GFR (mL/min/1.73)    Interpretation  G1                    90 or greater        Normal or high  (1)  G2                    60-89                Mild decrease (1)  G3a                   45-59                Mild to moderate decrease  G3b                   30-44                Moderate to severe decrease  G4                    15-29                Severe decrease  G5                    14 or less           Kidney failure    (1)In the absence of evidence of kidney disease, neither GFR category G1 or G2 fulfill the criteria for CKD.    eGFR calculation 2021 CKD-EPI creatinine equation, which does not include race as a factor    Blood Culture - Blood, Arm, Right [[id number]] Collected: 07/23/25 1605    Specimen: Blood from Arm, Right Updated: 07/23/25 1616    Respiratory Panel PCR w/COVID-19(SARS-CoV-2) MENDY/BERTHA/RANCHO/PAD/COR/JERROD In-House, NP Swab in UTM/VTM, 2 HR TAT - Swab, Nasopharynx [[id number]] Collected: 07/23/25 1529    Specimen: Swab from Nasopharynx Updated: 07/23/25 1615    Urinalysis With Microscopic If Indicated (No Culture) - Urine, Clean Catch [[id number]]  (Abnormal) Collected: 07/23/25 1605    Specimen: Urine, Clean Catch Updated: 07/23/25 1611     Color, UA [place]     Appearance, UA Cloudy     pH, UA 5.5     Specific Gravity, UA >=1.030     Glucose, UA Negative     Ketones, UA Trace     Bilirubin, UA Negative     Blood, UA Trace     Protein, UA >=300 mg/dL (3+)     Leuk Esterase, UA Negative     Nitrite, UA Negative     Urobilinogen, UA 1.0 E.U./dL    [place] Urine Culture Tube - Urine, Clean Catch [[id number]] Collected: 07/23/25 1605    Specimen: Urine, Clean Catch Updated: 07/23/25 1611    Urinalysis, Microscopic Only - Urine, Clean Catch [[id number]] Collected: 07/23/25 1605    Specimen: Urine, Clean Catch Updated: 07/23/25 1610    Lactic Acid, Plasma [[id number]]  (Abnormal) Collected: 07/23/25 1528    Specimen: Blood Updated: 07/23/25 1556     Lactate 3.0 mmol/L     CBC & Differential [[id number]]  (Abnormal) Collected: 07/23/25 1528    Specimen: Blood Updated: 07/23/25 1539    Narrative:      The  following orders were created for panel order CBC & Differential.  Procedure                               Abnormality         Status                     ---------                               -----------         ------                     CBC Auto Differential[[id number]]        Abnormal            Final result                 Please view results for these tests on the individual orders.    CBC Auto Differential [[id number]]  (Abnormal) Collected: 07/23/25 1528    Specimen: Blood Updated: 07/23/25 1539     WBC 8.24 10*3/mm3      RBC 4.61 10*6/mm3      Hemoglobin 14.1 g/dL      Hematocrit 41.6 %      MCV 90.2 fL      MCH 30.6 pg      MCHC 33.9 g/dL      RDW 13.1 %      RDW-SD 43.8 fl      MPV 9.0 fL      Platelets 194 10*3/mm3      Neutrophil % 90.6 %      Lymphocyte % 3.9 %      Monocyte % 3.5 %      Eosinophil % 0.1 %      Basophil % 0.2 %      Immature Grans % 1.7 %      Neutrophils, Absolute 7.46 10*3/mm3      Lymphocytes, Absolute 0.32 10*3/mm3      Monocytes, Absolute 0.29 10*3/mm3      Eosinophils, Absolute 0.01 10*3/mm3      Basophils, Absolute 0.02 10*3/mm3      Immature Grans, Absolute 0.14 10*3/mm3     Blood Culture - Blood, Arm, Left [[id number]] Collected: 07/23/25 1528    Specimen: Blood from Arm, Left Updated: 07/23/25 1537    COVID-19 and FLU A/B PCR, 1 HR TAT - Swab, Nasopharynx [[id number]]  (Normal) Collected: 07/23/25 1406    Specimen: Swab from Nasopharynx Updated: 07/23/25 1428     COVID19 Not Detected     Influenza A PCR Not Detected     Influenza B PCR Not Detected             Imaging Results (Last 24 Hours)       Procedure Component Value Units Date/Time    XR Chest 1 View [[id number]] Collected: 07/23/25 1553     Updated: 07/23/25 1556    Narrative:      XR CHEST 1 VW    Date of Exam: 7/23/2025 3:37 PM EDT    Indication: Fever, chills    Comparison: Chest CT and chest radiograph 3/19/2025.    Findings:  Cardiomediastinal silhouette is within normal limits. No focal consolidation. No pleural  effusion or pneumothorax. Osseous structures are unremarkable.      Impression:      Impression:  No acute cardiopulmonary findings.       Electronically Signed: Jasper Dodson MD    7/23/2025 3:54 PM EDT    Workstation ID: [id number]    XR Knee 3 View Right [[id number]] Collected: 07/23/25 1550     Updated: 07/23/25 1556    Narrative:      XR KNEE 3 VW RIGHT    Date of Exam: 7/23/2025 3:37 PM EDT    Indication: Surgery 1 month ago, pain today    Comparison: Radiograph dated April 2017    Findings:  3 views of the knee demonstrate patellar resurfacing and patellofemoral prosthesis. There is mild to moderate soft tissue swelling overlying the patella and patellar tendon. There is suggestion of a small joint effusion.      Impression:      Impression:  Status post patellar resurfacing.    Mild to moderate prepatellar soft tissue swelling.    No acute osseous abnormality      Electronically Signed: Pelon Bear MD    7/23/2025 3:54 PM EDT    Workstation ID: [id number]              Assessment & Plan        This is a 49 y.o. male with:    Active and Resolved Problems  Active Hospital Problems    Diagnosis  POA    **Tachycardia [R00.0]  Yes      Resolved Hospital Problems   No resolved problems to display.     Sepsis with unknown etiology of infection  -Patient started on broad-spectrum antibiotics, blood culture pending, CT chest abdomen pelvis pending  - Titrate antibiotics based on culture.  IV fluid ordered.  - Echocardiogram ordered due to significant tachycardia  - TSH pending  - bladder scan qshift    Hyponatremia likely secondary to dehydration-encourage IV fluid, continue IV fluid.    Lactic acidosis-improved with IV fluid.    Peripheral neuropathy-continue duloxetine  DM2-continue insulin sliding scale  Hypothyroid-Synthroid  HLD-statin      VTE Prophylaxis:  Pharmacologic VTE prophylaxis orders are present.        The patient desires to be as follows:    CODE STATUS:    Code Status (Patient has no pulse and is  not breathing): CPR (Attempt to Resuscitate)  Medical Interventions (Patient has pulse or is breathing): Full Support  Level Of Support Discussed With: Patient        Jesusita Hector, who can be contacted at [phone], is the designated person to make medical decisions on the patient's behalf if He is incapable of doing so. This was clarified with patient and/or next of kin on 7/23/2025 during the course of this H&P.    Admission Status:  I believe this patient meets inpatient status.    Expected Length of Stay: 2-3 days     PDMP and Medication Dispenses via Sidebar reviewed and consistent with patient reported medications.    I discussed the patient's findings and my recommendations with patient and family.      Signature:     This document has been electronically signed by Myles Mondragon MD on July 23, 2025 22:04 EDT   [facility] Hospitalist Team

## 2025-07-24 NOTE — PLAN OF CARE
Goal Outcome Evaluation:                        Problem: Adult Inpatient Plan of Care  Goal: Plan of Care Review  Outcome: Progressing  Goal: Patient-Specific Goal (Individualized)  Outcome: Progressing  Goal: Absence of Hospital-Acquired Illness or Injury  Outcome: Progressing  Intervention: Identify and Manage Fall Risk  Recent Flowsheet Documentation  Taken 7/24/2025 1600 by Kalee Finn RN  Safety Promotion/Fall Prevention: safety round/check completed  Taken 7/24/2025 1300 by Kalee Finn RN  Safety Promotion/Fall Prevention: safety round/check completed  Taken 7/24/2025 1200 by Kalee Finn RN  Safety Promotion/Fall Prevention: safety round/check completed  Taken 7/24/2025 1100 by Kalee Finn RN  Safety Promotion/Fall Prevention: safety round/check completed  Taken 7/24/2025 1000 by Kalee Finn RN  Safety Promotion/Fall Prevention: safety round/check completed  Taken 7/24/2025 0900 by Kalee Finn RN  Safety Promotion/Fall Prevention: safety round/check completed  Taken 7/24/2025 0800 by Kalee Finn RN  Safety Promotion/Fall Prevention:   assistive device/personal items within reach   clutter free environment maintained   gait belt   lighting adjusted   nonskid shoes/slippers when out of bed   room organization consistent   safety round/check completed  Intervention: Prevent Skin Injury  Recent Flowsheet Documentation  Taken 7/24/2025 1600 by Kalee Finn RN  Body Position: position changed independently  Taken 7/24/2025 1300 by Kalee Finn RN  Body Position: position changed independently  Taken 7/24/2025 1200 by Kalee Finn RN  Body Position: position changed independently  Taken 7/24/2025 1100 by Kalee Finn RN  Body Position: position changed independently  Taken 7/24/2025 1000 by Kalee Finn RN  Body Position: position changed independently  Taken 7/24/2025 0900 by Kalee Finn RN  Body Position: position changed independently  Taken 7/24/2025 0809 by  Kalee Finn RN  Body Position: position changed independently  Taken 7/24/2025 0800 by Kalee Finn RN  Body Position: position changed independently  Skin Protection: incontinence pads utilized  Intervention: Prevent and Manage VTE (Venous Thromboembolism) Risk  Recent Flowsheet Documentation  Taken 7/24/2025 0800 by Kalee Finn RN  VTE Prevention/Management:   SCDs (sequential compression devices) off   patient refused intervention  Intervention: Prevent Infection  Recent Flowsheet Documentation  Taken 7/24/2025 0800 by Kalee Finn RN  Infection Prevention: hand hygiene promoted  Goal: Optimal Comfort and Wellbeing  Outcome: Progressing  Intervention: Provide Person-Centered Care  Recent Flowsheet Documentation  Taken 7/24/2025 0800 by Kalee Finn RN  Trust Relationship/Rapport:   care explained   choices provided   emotional support provided   empathic listening provided   questions encouraged   reassurance provided   thoughts/feelings acknowledged   questions answered  Goal: Readiness for Transition of Care  Outcome: Progressing     Problem: Comorbidity Management  Goal: Maintenance of Asthma Control  Outcome: Progressing  Intervention: Maintain Asthma Symptom Control  Recent Flowsheet Documentation  Taken 7/24/2025 0800 by Kalee Finn RN  Medication Review/Management: medications reviewed  Goal: Maintenance of Behavioral Health Symptom Control  Outcome: Progressing  Intervention: Maintain Behavioral Health Symptom Control  Recent Flowsheet Documentation  Taken 7/24/2025 0800 by Kalee Finn RN  Medication Review/Management: medications reviewed  Goal: Maintenance of COPD Symptom Control  Outcome: Progressing  Intervention: Maintain COPD (Chronic Obstructive Pulmonary Disease) Symptom Control  Recent Flowsheet Documentation  Taken 7/24/2025 0800 by Kalee Finn RN  Medication Review/Management: medications reviewed  Goal: Blood Glucose Level Within Target Range  Outcome:  Progressing  Intervention: Monitor and Manage Glycemia  Recent Flowsheet Documentation  Taken 7/24/2025 0800 by Kalee Finn RN  Medication Review/Management: medications reviewed  Goal: Maintenance of Heart Failure Symptom Control  Outcome: Progressing  Intervention: Maintain Heart Failure Management  Recent Flowsheet Documentation  Taken 7/24/2025 0800 by Kalee Finn RN  Medication Review/Management: medications reviewed  Goal: Blood Pressure in Desired Range  Outcome: Progressing  Intervention: Maintain Blood Pressure Management  Recent Flowsheet Documentation  Taken 7/24/2025 0800 by Kalee Finn RN  Medication Review/Management: medications reviewed  Goal: Maintenance of Osteoarthritis Symptom Control  Outcome: Progressing  Intervention: Maintain Osteoarthritis Symptom Control  Recent Flowsheet Documentation  Taken 7/24/2025 0800 by Kalee Finn RN  Activity Management:   ambulated in room   ambulated to bathroom   back to bed   up in chair   activity encouraged  Adaptive Equipment Use: used independently  Assistive Device Utilized: walker  Medication Review/Management: medications reviewed  Goal: Bariatric Home Regimen Maintained  Outcome: Progressing  Intervention: Maintain and Manage Postbariatric Surgery Care  Recent Flowsheet Documentation  Taken 7/24/2025 0800 by Kalee Finn RN  Medication Review/Management: medications reviewed  Goal: Maintenance of Seizure Control  Outcome: Progressing  Intervention: Maintain Seizure Symptom Control  Recent Flowsheet Documentation  Taken 7/24/2025 0800 by Kalee Finn RN  Medication Review/Management: medications reviewed

## 2025-07-24 NOTE — CONSULTS
Patient ID: Timo Hector is a 49 y.o. male.    Chief Complaint:    Chief Complaint   Patient presents with    Headache       HPI:  This is a 49-year-old gentleman who had a knee arthroplasty about 4 weeks ago with Dr. Montoya at [facility].  He was admitted after not feeling well he is related right knee pain as well  Past Medical History:   Diagnosis Date    Abscess of leg, right 08/02/2017    Acute pharyngitis 03/15/2015    B12 deficiency 06/22/2018    Cellulitis 11/14/2014    Edema     Elbow pain 03/02/2015    Foot pain, left 09/20/2018    Gout 11/11/2014    Headache 02/21/2014    Hernia of abdominal cavity 03/02/2015    Hypertension 11/11/2014    Hypothyroidism 10/26/2017    Knee pain, right 04/15/2017    Laceration without foreign body of unspecified toe without damage to nail, initial encounter 06/22/2018    Leg pain, left     Lipoma of skin     Metatarsalgia, left foot 02/21/2019    Myalgia 10/26/2017    Obesity 02/21/2014    Obstructive sleep apnea 02/21/2014    Osteoarthritis 02/21/2014    Paresthesia 01/28/2016    Peripheral neuropathy 10/26/2017    Plantar fasciitis of right foot 01/28/2016    Pre-diabetes 06/22/2018    Renal insufficiency 06/22/2018    RUQ pain 06/01/2017    Sinusitis, acute 12/04/2015    Skin lesion 03/02/2015    Snoring     Sprain of tarsometatarsal ligament of left foot 01/11/2019    INITIAL ENCOUNTER    Stomatitis and mucositis 03/15/2015    (ULCERATIVE)    Unspecified fracture of left foot, initial encounter for closed fracture 09/20/2018    URI (upper respiratory infection) 01/15/2015    Vitamin B deficiency, unspecified 02/12/2016       Past Surgical History:   Procedure Laterality Date    GALLBLADDER SURGERY      HERNIA REPAIR      KNEE SURGERY Right     PILONIDAL CYSTECTOMY         Family History   Problem Relation Age of Onset    Cancer Mother     Hypertension Father     Osteoarthritis Father     No Known Problems Sister     Diabetes Brother           Social History  "    Occupational History     Employer: [employer]   Tobacco Use    Smoking status: Never     Passive exposure: Never    Smokeless tobacco: Current     Types: Chew   Vaping Use    Vaping status: Never Used   Substance and Sexual Activity    Alcohol use: Not Currently     Comment: sober since 2010    Drug use: Never    Sexual activity: Defer      Review of Systems   Cardiovascular:  Negative for chest pain.   Musculoskeletal:  Positive for arthralgias.       Objective:    /73 (BP Location: Right arm, Patient Position: Lying)   Pulse (!) 127   Temp 99.1 °F (37.3 °C) (Oral)   Resp 28   Ht 174 cm (68.5\")   Wt 99.9 kg (220 lb 3.2 oz)   SpO2 93%   BMI 32.99 kg/m²     Physical Examination:  Right knee demonstrates a healed midline incision there is however some surrounding fluid which is slightly red mild to moderate knee effusion knee is tender to range of motion mild warmth calf is soft gross sensation is intact but overall he states he has baseline neuropathy gross motor function intact    Imaging:  X-ray demonstrates a patellofemoral arthroplasty in position bony effusion    Assessment:  Pain after right knee arthroplasty    Plan: Discussed the concern for superficial versus deep infection I discussed his further care with the hospitalist arrangements should be made to contact his orthopedic surgeon in [place] for definitive management this will likely involve a transfer to their inpatient center.  In the meantime he is on antibiotics for possible sepsis can be weightbearing as tolerated no surgery planned under my care.      Disclaimer: Part of this note may be an electronic transcription/translation of spoken language to printed text using the Dragon Dictation System   "

## 2025-07-24 NOTE — CONSULTS
Infectious Diseases Consult Note    Referring Provider: Lee Lpoes MD    Reason for Consultation: Right knee septic arthritis    Patient Care Team:  Paras Worthy MD as PCP - General (Internal Medicine)  Jessica Pichardo MD as Consulting Physician (Cardiology)    Chief complaint headache knee pain and swelling, fever, chills    Subjective     History of present illness:      This is a 49-year-old male who presents to the hospital on 7/23/2025 with complaints of feeling well after vacationing several days ago.  Admits to fever, chills and tremors.  Also admits to drinking significantly and normally drinks 30 beers a day.  Patient recently had a knee surgery and right knee has been painful and swelling.  He also complained of some headaches and his wife states that he has had a bit of confusion.    Review of Systems   Review of Systems   Constitutional:  Positive for chills, fatigue and fever.   Eyes: Negative.    Respiratory: Negative.     Cardiovascular: Negative.    Gastrointestinal: Negative.    Endocrine: Negative.    Genitourinary: Negative.    Musculoskeletal:  Positive for joint swelling.   Skin: Negative.    Neurological:  Positive for tremors and headaches.   Psychiatric/Behavioral: Negative.     All other systems reviewed and are negative.      Medications  Facility-Administered Medications Prior to Admission   Medication Dose Route Frequency Provider Last Rate Last Admin    cyanocobalamin injection 1,000 mcg  1,000 mcg Intramuscular Q28 Days Stacie Miller APRN         Medications Prior to Admission   Medication Sig Dispense Refill Last Dose/Taking    DULoxetine HCl (DRIZALMA) 30 MG capsule Take 2 capsules by mouth 2 (Two) Times a Day.       levothyroxine (SYNTHROID, LEVOTHROID) 25 MCG tablet TAKE 1 TABLET BY MOUTH DAILY 90 tablet 1     lisinopril-hydrochlorothiazide (PRINZIDE,ZESTORETIC) 20-25 MG per tablet TAKE 1 TABLET BY MOUTH DAILY 30 tablet 1     metFORMIN ER (GLUCOPHAGE-XR) 500 MG 24 hr  tablet Take 1 tablet by mouth Daily With Dinner for 180 days. 90 tablet 1     rosuvastatin (Crestor) 10 MG tablet Take 1 tablet by mouth Daily. 90 tablet 1        History  Past Medical History:   Diagnosis Date    Abscess of leg, right 08/02/2017    Acute pharyngitis 03/15/2015    B12 deficiency 06/22/2018    Cellulitis 11/14/2014    Edema     Elbow pain 03/02/2015    Foot pain, left 09/20/2018    Gout 11/11/2014    Headache 02/21/2014    Hernia of abdominal cavity 03/02/2015    Hypertension 11/11/2014    Hypothyroidism 10/26/2017    Knee pain, right 04/15/2017    Laceration without foreign body of unspecified toe without damage to nail, initial encounter 06/22/2018    Leg pain, left     Lipoma of skin     Metatarsalgia, left foot 02/21/2019    Myalgia 10/26/2017    Obesity 02/21/2014    Obstructive sleep apnea 02/21/2014    Osteoarthritis 02/21/2014    Paresthesia 01/28/2016    Peripheral neuropathy 10/26/2017    Plantar fasciitis of right foot 01/28/2016    Pre-diabetes 06/22/2018    Renal insufficiency 06/22/2018    RUQ pain 06/01/2017    Sinusitis, acute 12/04/2015    Skin lesion 03/02/2015    Snoring     Sprain of tarsometatarsal ligament of left foot 01/11/2019    INITIAL ENCOUNTER    Stomatitis and mucositis 03/15/2015    (ULCERATIVE)    Unspecified fracture of left foot, initial encounter for closed fracture 09/20/2018    URI (upper respiratory infection) 01/15/2015    Vitamin B deficiency, unspecified 02/12/2016     Past Surgical History:   Procedure Laterality Date    GALLBLADDER SURGERY      HERNIA REPAIR      KNEE SURGERY Right     PILONIDAL CYSTECTOMY         Family History  Family History   Problem Relation Age of Onset    Cancer Mother     Hypertension Father     Osteoarthritis Father     No Known Problems Sister     Diabetes Brother        Social History   reports that he has never smoked. He has never been exposed to tobacco smoke. His smokeless tobacco use includes chew. He reports that he does not  currently use alcohol. He reports that he does not use drugs.    Allergies  Lyrica [pregabalin]    Objective     Vital Signs   Vital Signs (last 24 hours)         07/23 0700  07/24 0659 07/24 0700  07/24 1753   Most Recent      Temp (°F) 98.3 -  103    98.2 -  100.7     --  Comment: pt off unit 07/24 1335    Heart Rate 112 -  137    112 -  127     117  Comment: pt back on unit 07/24 1600    Resp 18 -  27    23 -  28     24 07/24 1600    /53 -  149/77    88/49 -  129/73     105/70 07/24 1600    SpO2 (%) 93 -  99    92 -  97     92 07/24 1600            Physical Exam:  Physical Exam  Vitals and nursing note reviewed.   Constitutional:       General: He is not in acute distress.     Appearance: He is well-developed and normal weight. He is ill-appearing and diaphoretic.   HENT:      Head: Normocephalic and atraumatic.   Eyes:      Conjunctiva/sclera: Conjunctivae normal.      Pupils: Pupils are equal, round, and reactive to light.   Neck:      Comments: neck is very supple  Cardiovascular:      Rate and Rhythm: Normal rate and regular rhythm.      Heart sounds: Normal heart sounds, S1 normal and S2 normal.   Pulmonary:      Effort: Pulmonary effort is normal. No respiratory distress.      Breath sounds: Normal breath sounds. No stridor. No wheezing or rales.   Abdominal:      General: Bowel sounds are normal. There is no distension.      Palpations: Abdomen is soft. There is no mass.      Tenderness: There is no abdominal tenderness. There is no guarding.   Musculoskeletal:         General: Swelling present. No deformity.      Cervical back: Neck supple.      Comments: Significant swelling to the right knee with some fluctuance erythema and warmth   Skin:     General: Skin is warm.      Coloration: Skin is not pale.      Findings: No erythema or rash.   Neurological:      Mental Status: He is alert and oriented to person, place, and time.      Cranial Nerves: No cranial nerve deficit.   Psychiatric:         Mood  and Affect: Mood normal.         Microbiology  Microbiology Results (last 10 days)       Procedure Component Value - Date/Time    Culture, CSF - Cerebrospinal Fluid, Lumbar Puncture [[id number]] Collected: 07/24/25 1502    Lab Status: Preliminary result Specimen: Cerebrospinal Fluid from Lumbar Puncture Updated: 07/24/25 1745     Gram Stain No organisms seen    Meningitis / Encephalitis Panel, PCR - Cerebrospinal Fluid, Lumbar Puncture [[id number]]  (Normal) Collected: 07/24/25 1502    Lab Status: Final result Specimen: Cerebrospinal Fluid from Lumbar Puncture Updated: 07/24/25 1729     ESCHERICHIA COLI K1, PCR Not Detected     HAEMOPHILUS INFLUENZAE, PCR Not Detected     LISTERIA MONOCYTOGENES, PCR Not Detected     NEISSERIA MENINGITIDIS, PCR Not Detected     STREPTOCOCCUS AGALACTIAE, PCR Not Detected     STREPTOCOCCUS PNEUMONIAE, PCR Not Detected     CYTOMEGALOVIRUS (CMV), PCR Not Detected     ENTEROVIRUS, PCR Not Detected     HERPES SIMPLEX VIRUS 1 (HSV-1), PCR Not Detected     HERPES SIMPLEX VIRUS 2 (HSV-2), PCR Not Detected     HUMAN PARECHOVIRUS, PCR Not Detected     VARICELLA ZOSTER VIRUS (VZV), PCR Not Detected     CRYPTOCOCCUS NEOFORMANS / GATTII, PCR Not Detected     HUMAN HERPES VIRUS 6 PCR Not Detected    MRSA Screen, PCR (Inpatient) - Swab, Nares [[id number]]  (Normal) Collected: 07/24/25 0533    Lab Status: Final result Specimen: Swab from Nares Updated: 07/24/25 0724     MRSA PCR No MRSA Detected    Narrative:      The negative predictive value of this diagnostic test is high and should only be used to consider de-escalating anti-MRSA therapy. A positive result may indicate colonization with MRSA and must be correlated clinically.    Blood Culture - Blood, Arm, Right [[id number]]  (Abnormal) Collected: 07/23/25 1605    Lab Status: Preliminary result Specimen: Blood from Arm, Right Updated: 07/24/25 1100     Blood Culture Abnormal Stain     Gram Stain Anaerobic Bottle Gram positive cocci in chains    Blood  Culture ID, PCR - Blood, Arm, Right [[id number]]  (Abnormal) Collected: 07/23/25 1605    Lab Status: Final result Specimen: Blood from Arm, Right Updated: 07/24/25 1100     BCID, PCR Streptococcus spp, not A, B, or pneumoniae. Identification by BCID2 PCR.     BOTTLE TYPE Anaerobic Bottle    Respiratory Panel PCR w/COVID-19(SARS-CoV-2) [facility]ERR[facility] In-House, NP Swab in UTM/VTM, 2 HR TAT - Swab, Nasopharynx [[id number]]  (Normal) Collected: 07/23/25 1529    Lab Status: Final result Specimen: Swab from Nasopharynx Updated: 07/23/25 2309     ADENOVIRUS, PCR Not Detected     Coronavirus 229E Not Detected     Coronavirus HKU1 Not Detected     Coronavirus NL63 Not Detected     Coronavirus OC43 Not Detected     COVID19 Not Detected     Human Metapneumovirus Not Detected     Human Rhinovirus/Enterovirus Not Detected     Influenza A PCR Not Detected     Influenza B PCR Not Detected     Parainfluenza Virus 1 Not Detected     Parainfluenza Virus 2 Not Detected     Parainfluenza Virus 3 Not Detected     Parainfluenza Virus 4 Not Detected     RSV, PCR Not Detected     Bordetella pertussis pcr Not Detected     Bordetella parapertussis PCR Not Detected     Chlamydophila pneumoniae PCR Not Detected     Mycoplasma pneumo by PCR Not Detected    Narrative:      In the setting of a positive respiratory panel with a viral infection PLUS a negative procalcitonin without other underlying concern for bacterial infection, consider observing off antibiotics or discontinuation of antibiotics and continue supportive care. If the respiratory panel is positive for atypical bacterial infection (Bordetella pertussis, Chlamydophila pneumoniae, or Mycoplasma pneumoniae), consider antibiotic de-escalation to target atypical bacterial infection.    Blood Culture - Blood, Arm, Left [[id number]]  (Normal) Collected: 07/23/25 1528    Lab Status: Preliminary result Specimen: Blood from Arm, Left Updated: 07/24/25 1545     Blood Culture No growth at  24 hours    Narrative:      Less than seven (7) mL's of blood was collected.  Insufficient quantity may yield false negative results.    COVID-19 and FLU A/B PCR, 1 HR TAT - Swab, Nasopharynx [[id number]]  (Normal) Collected: 07/23/25 1406    Lab Status: Final result Specimen: Swab from Nasopharynx Updated: 07/23/25 1428     COVID19 Not Detected     Influenza A PCR Not Detected     Influenza B PCR Not Detected            Laboratory  Results from last 7 days   Lab Units 07/24/25  0601   WBC 10*3/mm3 11.22*   HEMOGLOBIN g/dL 11.1*   HEMATOCRIT % 33.2*   PLATELETS 10*3/mm3 161     Results from last 7 days   Lab Units 07/24/25  1053 07/24/25  0450   SODIUM mmol/L  --  128*   POTASSIUM mmol/L 4.0 3.6   CHLORIDE mmol/L  --  96*   CO2 mmol/L  --  17.6*   BUN mg/dL  --  9.5   CREATININE mg/dL  --  0.87   GLUCOSE mg/dL  --  175*   CALCIUM mg/dL  --  8.5*     Results from last 7 days   Lab Units 07/24/25  1053 07/24/25  0450   SODIUM mmol/L  --  128*   POTASSIUM mmol/L 4.0 3.6   CHLORIDE mmol/L  --  96*   CO2 mmol/L  --  17.6*   BUN mg/dL  --  9.5   CREATININE mg/dL  --  0.87   GLUCOSE mg/dL  --  175*   CALCIUM mg/dL  --  8.5*                   Radiology  Imaging Results (Last 72 Hours)       Procedure Component Value Units Date/Time    IR LUMBAR PUNCTURE DIAGNOSTIC [[id number]] Collected: 07/24/25 1518     Updated: 07/24/25 1541    Narrative:      DATE OF EXAM:  7/24/2025 2:56 PM EDT    PROCEDURE:  IR LUMBAR PUNCTURE DIAGNOSTIC    INDICATIONS:  Sepsis with AMS    COMPARISON:  No Comparisons Available    FLUOROSCOPIC TIME:  44 seconds    PHYSICIAN MONITORED CONSCIOUS SEDATION TIME:  Fentanyl only    TECHNIQUE:   A detailed explanation of the procedure, including the risks, benefits, and alternatives was provided. Informed written consent was obtained from the patient. A preprocedure timeout was performed. The patient was placed in the left lateral decubitus   position on the fluoroscopy table and the lower back was marked and  prepped and draped in the usual sterile fashion.    The skin was anesthetized with 1% lidocaine.  Under fluoroscopic guidance a 22-gauge 3 1/2 inch Franchesca needle was advanced into the thecal sac. Intrathecal position of the needle tip was confirmed utilizing fluoroscopic imaging as well as the reflux of   clear colorless CSF. A total of 9 mL of clear CSF fluid was removed. The needle was then removed. Hemostasis was achieved and a sterile bandage was applied. Specimens were collected for lab examination per the ordering clinician. The patient tolerated   the procedure well without immediate complication. The patient was transported back to the inpatient unit in stable condition for recovery.    FINDINGS:  See above      Impression:      Successful diagnostic lumbar puncture utilizing fluoroscopic guidance and yielding 9 mL of clear colorless CSF. Specimens were collected for lab examination per the ordering clinician.      Report dictated by: Kingsley Chakraborty DNP      I have personally reviewed this case and agree with the findings above:    Electronically Signed: Nathan Mcdowell MD    7/24/2025 3:39 PM EDT    Workstation ID: [id number]    US GUIDED INJECT/ASP MEDIUM JOINT OR BURSA [[id number]] Collected: 07/24/25 1514     Updated: 07/24/25 1541    Narrative:      DATE OF EXAM:  7/24/2025 2:18 PM EDT    PROCEDURE:  US GUIDED INJECT/ASP MEDIUM JOINT OR BURSA, IR PUNCTURE ASP/ABS/HEMA/CYST    INDICATIONS:  Drainage of abscess    COMPARISON:  CT lower extremity right with contrast    FLUOROSCOPIC TIME:  9    PHYSICIAN MONITORED CONSCIOUS SEDATION TIME:  None minutes    TECHNIQUE:   A detailed explanation of the procedure, including the risks, benefits, and alternatives was provided. Informed written consent was obtained from the patient. A preprocedure timeout was performed. The patient's right knee was ultrasounded demonstrating a   complex appearing soft tissue fluid collection as well as a suprapatellar joint effusion.   First, the soft tissue fluid collection was addressed. The appropriate access site was marked, prepped and draped using maximal sterile barrier technique. The   skin and subcutaneous tissues was anesthetized with 1% lidocaine. Next, utilizing ultrasound guidance, an 18-gauge needle was advanced to the fluid collection. A total of 60 mL of purulent fluid with debris was aspirated. Specimens were collected for lab   examination per the ordering clinician. Hemostasis was achieved and a sterile bandage was applied.    Next, attention was then turned to the suprapatellar joint space. The appropriate access site on the lateral aspect of the right knee was marked, prepped and draped in maximal sterile breast technique. The skin and the cutaneous tissues was anesthetized   1% lidocaine. Finally, utilizing ultrasound guidance, an 18-gauge needle was advanced to the right suprapatellar joint space. From this position, a total of 10 mL of bloody purulent fluid with debris was aspirated. The needle was removed and hemostasis   was achieved. A sterile bandage was applied. Specimens from both access sites were collected for lab examination per the ordering clinician.      FINDINGS:  See above      Impression:        1. Successful ultrasound-guided aspiration of right knee soft tissue fluid collection yielding 60 mL of purulent fluid. Specimen collected for lab examination per the ordering clinician.  2. Successful ultrasound-guided aspiration of right suprapatellar joint space yielding 10 mL of bloody purulent fluid. Specimen collected for lab examination per the ordering clinician.      Report dictated by: Kingsley Chakraborty DNP      I have personally reviewed this case and agree with the findings above:    Electronically Signed: Nathan Mcdowell MD    7/24/2025 3:39 PM EDT    Workstation ID: [id number]    IR Puncture asp/abs/hema/cyst [[id number]] Collected: 07/24/25 1514     Updated: 07/24/25 1541    Narrative:      DATE OF EXAM:  7/24/2025  2:18 PM EDT    PROCEDURE:  US GUIDED INJECT/ASP MEDIUM JOINT OR BURSA, IR PUNCTURE ASP/ABS/HEMA/CYST    INDICATIONS:  Drainage of abscess    COMPARISON:  CT lower extremity right with contrast    FLUOROSCOPIC TIME:  9    PHYSICIAN MONITORED CONSCIOUS SEDATION TIME:  None minutes    TECHNIQUE:   A detailed explanation of the procedure, including the risks, benefits, and alternatives was provided. Informed written consent was obtained from the patient. A preprocedure timeout was performed. The patient's right knee was ultrasounded demonstrating a   complex appearing soft tissue fluid collection as well as a suprapatellar joint effusion.  First, the soft tissue fluid collection was addressed. The appropriate access site was marked, prepped and draped using maximal sterile barrier technique. The   skin and subcutaneous tissues was anesthetized with 1% lidocaine. Next, utilizing ultrasound guidance, an 18-gauge needle was advanced to the fluid collection. A total of 60 mL of purulent fluid with debris was aspirated. Specimens were collected for lab   examination per the ordering clinician. Hemostasis was achieved and a sterile bandage was applied.    Next, attention was then turned to the suprapatellar joint space. The appropriate access site on the lateral aspect of the right knee was marked, prepped and draped in maximal sterile breast technique. The skin and the cutaneous tissues was anesthetized   1% lidocaine. Finally, utilizing ultrasound guidance, an 18-gauge needle was advanced to the right suprapatellar joint space. From this position, a total of 10 mL of bloody purulent fluid with debris was aspirated. The needle was removed and hemostasis   was achieved. A sterile bandage was applied. Specimens from both access sites were collected for lab examination per the ordering clinician.      FINDINGS:  See above      Impression:        1. Successful ultrasound-guided aspiration of right knee soft tissue fluid  collection yielding 60 mL of purulent fluid. Specimen collected for lab examination per the ordering clinician.  2. Successful ultrasound-guided aspiration of right suprapatellar joint space yielding 10 mL of bloody purulent fluid. Specimen collected for lab examination per the ordering clinician.      Report dictated by: Kingsley Chakraborty DNP      I have personally reviewed this case and agree with the findings above:    Electronically Signed: Nathan Mcdowell MD    7/24/2025 3:39 PM EDT    Workstation ID: [id number]    CT Head With & Without Contrast [[id number]] Collected: 07/24/25 1412     Updated: 07/24/25 1415    Narrative:      CT HEAD W WO CONTRAST    Date of Exam: 7/24/2025 2:09 PM EDT    Indication: c/o eye pain, rsk of meningitis.    Comparison: None available.    Technique: Axial CT images were obtained of the head before and after the uneventful intravenous administration of iodinated contrast.  Sagittal and coronal reconstructions were performed.  Automated exposure control and iterative reconstruction methods   were used.      Findings:  There is no evidence of acute infarction.    There is no acute intracranial hemorrhage. There are no extra-axial collections.    Ventricles and CSF spaces are symmetric. No mass effect nor hydrocephalus.    Brain parenchyma appears normal for age. No abnormal enhancement.     Paranasal sinuses and mastoid air cells are adequately aerated.     Osseous structures and orbits appear intact.      Impression:      Impression:  No acute process identified.        Electronically Signed: Esequiel Mcgraw MD    7/24/2025 2:13 PM EDT    Workstation ID: [id number]    CT Lower Extremity Right With Contrast [[id number]] Collected: 07/24/25 1026     Updated: 07/24/25 1033    Narrative:      CT LOWER EXTREMITY RIGHT W CONTRAST    Date of Exam: 7/24/2025 10:19 AM EDT    Indication: possible infected knee cap. Knee radiograph patient is septic. Patellofemoral compartment arthroplasty placed 4 weeks  ago.    Comparison: Knee radiograph 7/23/2025 4/15/2017, CT chest 7 pelvis 7/24/2025    Technique: Axial CT images were obtained of the right lower extremity after the uneventful intravenous administration of iodinated contrast.  Sagittal and coronal reconstructions were performed.  Automated exposure control and iterative reconstruction   methods were used.      Findings:  There is a large joint effusion with small foci of gas. There is extension from the joint fluid through a defect in the lateral patellar retinaculum into the subcutaneous tissues in the prepatellar and infrapatellar location. Through this defect there is   extension of fluid and gas collection into the subcutaneous tissues. This measures 6.8 x 2.3 x 11.2 cm and is suspicious for abscess. Aspiration analysis of fluid is recommended. The connectivity with the joint fluid is suspicious for septic arthritis.   There is thick of the patellar tendon but the fibers appear intact. The quadriceps tendon is intact.    There is metallic streak artifact from patellofemoral compartment arthroplasty. The arthroplasty components have appropriate position. No periprosthetic fracture. There is no osseous lucency or sclerosis to suggest osteomyelitis at this time. There is   mild soft tissue edema along the anterior knee.      Impression:      Impression:  There is a large joint effusion with small foci of gas. There is extension from the joint fluid through a defect in the lateral patellar retinaculum into the subcutaneous tissues in the prepatellar and infrapatellar location. Through this defect there is   extension of fluid and gas collection into the subcutaneous tissues. This measures 6.8 x 2.3 x 11.2 cm and is suspicious for abscess. Aspiration analysis of fluid is recommended. The connectivity with the joint fluid is suspicious for septic arthritis.   No CT findings of osteomyelitis at this time.  There is a patellofemoral compartment  arthroplasty.        Electronically Signed: Joellen Balderas MD    7/24/2025 10:31 AM EDT    Workstation ID: [id number]    CT Chest With Contrast Diagnostic [[id number]] Collected: 07/24/25 0222     Updated: 07/24/25 0238    Narrative:      CT CHEST W CONTRAST DIAGNOSTIC, CT ABDOMEN PELVIS W CONTRAST    Date of Exam: 7/24/2025 1:12 AM EDT    Indication: r/o infection in the setting of sepsis.    Comparison: CT chest 3/19/2025    Technique: Axial CT images were obtained of the chest, abdomen, and pelvis after the uneventful intravenous administration of iodinated contrast.  Sagittal and coronal reconstructions were performed.  Automated exposure control and iterative   reconstruction methods were used.    Findings:  CHEST: Thyroid is grossly homogeneous. No pleural or pericardial effusion is identified. There is no adenopathy. There is a 4 mm right upper lobe nodule on image 33 that is unchanged. The lungs are otherwise clear. No evidence of acute pneumonia. No   acute osseous lesions.    ABDOMEN/PELVIS: Mild atherosclerotic disease is present. No aortic aneurysm identified. Gallbladder is surgically absent. No biliary obstruction. There is generalized hepatic steatosis. No liver mass. The solid abdominal organs are otherwise normal. The   kidneys are nonobstructed. Urinary bladder and prostate gland appear normal. The colon contains a moderate amount of gas with some stool. No evidence of acute colitis. The appendix is normal. There are some gas-filled small bowel loops suggesting a mild   ileus. No definite small bowel obstruction is seen. There is no bowel wall thickening identified to suggest enteritis. The stomach and duodenum are normal. No retroperitoneal adenopathy is seen. There is no free air. There is bilateral inguinal   adenopathy. One of the largest nodes on the left measures 1.6 cm, and one of the largest nodes on the right measures 1.3 cm. These may be reactive. Clinical follow-up is recommended. No acute  osseous lesions are identified.      Impression:      1.No active disease in the chest. There is a stable 4 mm right upper lobe pulmonary nodule. In the absence of risk factors for malignancy, no follow-up is indicated. Otherwise, follow-up could be obtained in 12 months.  2.Hepatic steatosis.  3.Cholecystectomy without biliary obstruction.  4.Probable mild small bowel ileus without obstruction. Normal large bowel and appendix.  5.Normal nonobstructed kidneys.  6.Bilateral inguinal adenopathy, possibly reactive. Continued follow-up recommended. No other adenopathy is identified in the chest, abdomen, or pelvis.            Electronically Signed: Zachary Barakat MD    7/24/2025 2:36 AM EDT    Workstation ID: [id number]    CT Abdomen Pelvis With Contrast [[id number]] Collected: 07/24/25 0222     Updated: 07/24/25 0238    Narrative:      CT CHEST W CONTRAST DIAGNOSTIC, CT ABDOMEN PELVIS W CONTRAST    Date of Exam: 7/24/2025 1:12 AM EDT    Indication: r/o infection in the setting of sepsis.    Comparison: CT chest 3/19/2025    Technique: Axial CT images were obtained of the chest, abdomen, and pelvis after the uneventful intravenous administration of iodinated contrast.  Sagittal and coronal reconstructions were performed.  Automated exposure control and iterative   reconstruction methods were used.    Findings:  CHEST: Thyroid is grossly homogeneous. No pleural or pericardial effusion is identified. There is no adenopathy. There is a 4 mm right upper lobe nodule on image 33 that is unchanged. The lungs are otherwise clear. No evidence of acute pneumonia. No   acute osseous lesions.    ABDOMEN/PELVIS: Mild atherosclerotic disease is present. No aortic aneurysm identified. Gallbladder is surgically absent. No biliary obstruction. There is generalized hepatic steatosis. No liver mass. The solid abdominal organs are otherwise normal. The   kidneys are nonobstructed. Urinary bladder and prostate gland appear normal. The colon  contains a moderate amount of gas with some stool. No evidence of acute colitis. The appendix is normal. There are some gas-filled small bowel loops suggesting a mild   ileus. No definite small bowel obstruction is seen. There is no bowel wall thickening identified to suggest enteritis. The stomach and duodenum are normal. No retroperitoneal adenopathy is seen. There is no free air. There is bilateral inguinal   adenopathy. One of the largest nodes on the left measures 1.6 cm, and one of the largest nodes on the right measures 1.3 cm. These may be reactive. Clinical follow-up is recommended. No acute osseous lesions are identified.      Impression:      1.No active disease in the chest. There is a stable 4 mm right upper lobe pulmonary nodule. In the absence of risk factors for malignancy, no follow-up is indicated. Otherwise, follow-up could be obtained in 12 months.  2.Hepatic steatosis.  3.Cholecystectomy without biliary obstruction.  4.Probable mild small bowel ileus without obstruction. Normal large bowel and appendix.  5.Normal nonobstructed kidneys.  6.Bilateral inguinal adenopathy, possibly reactive. Continued follow-up recommended. No other adenopathy is identified in the chest, abdomen, or pelvis.            Electronically Signed: Zachary Barakat MD    7/24/2025 2:36 AM EDT    Workstation ID: [id number]    XR Chest 1 View [[id number]] Collected: 07/23/25 1553     Updated: 07/23/25 1556    Narrative:      XR CHEST 1 VW    Date of Exam: 7/23/2025 3:37 PM EDT    Indication: Fever, chills    Comparison: Chest CT and chest radiograph 3/19/2025.    Findings:  Cardiomediastinal silhouette is within normal limits. No focal consolidation. No pleural effusion or pneumothorax. Osseous structures are unremarkable.      Impression:      Impression:  No acute cardiopulmonary findings.       Electronically Signed: Jasper Dodson MD    7/23/2025 3:54 PM EDT    Workstation ID: [id number]    XR Knee 3 View Right [[id number]]  Collected: 07/23/25 1550     Updated: 07/23/25 1556    Narrative:      XR KNEE 3 VW RIGHT    Date of Exam: 7/23/2025 3:37 PM EDT    Indication: Surgery 1 month ago, pain today    Comparison: Radiograph dated April 2017    Findings:  3 views of the knee demonstrate patellar resurfacing and patellofemoral prosthesis. There is mild to moderate soft tissue swelling overlying the patella and patellar tendon. There is suggestion of a small joint effusion.      Impression:      Impression:  Status post patellar resurfacing.    Mild to moderate prepatellar soft tissue swelling.    No acute osseous abnormality      Electronically Signed: Pelon Bear MD    7/23/2025 3:54 PM EDT    Workstation ID: [id number]            Cardiology      Results Review:  I have reviewed all clinical data, test, lab, and imaging results.       Schedule Meds  cefTRIAXone, 2,000 mg, Intravenous, Q24H  DULoxetine, 60 mg, Oral, Q12H  enoxaparin sodium, 40 mg, Subcutaneous, Daily  folic acid, 1 mg, Oral, Daily  insulin lispro, 2-7 Units, Subcutaneous, 4x Daily AC & at Bedtime  levothyroxine, 25 mcg, Oral, Q AM  mupirocin, 1 Application, Each Nare, BID  rosuvastatin, 10 mg, Oral, Daily  sodium chloride, 10 mL, Intravenous, Q12H  thiamine (B-1) IV, 200 mg, Intravenous, Q8H   Followed by  [START ON 7/29/2025] thiamine, 100 mg, Oral, Daily  vancomycin, 1,250 mg, Intravenous, Q12H        Infusion Meds  lactated ringers, 75 mL/hr, Last Rate: 75 mL/hr (07/24/25 1600)  Pharmacy to dose vancomycin,   Pharmacy To Dose:,         PRN Meds    acetaminophen    senna-docusate sodium **AND** polyethylene glycol **AND** bisacodyl **AND** bisacodyl    Calcium Replacement - Follow Nurse / BPA Driven Protocol    dextrose    dextrose    diazePAM **OR** diazePAM **OR** diazePAM **OR** diazePAM **OR** diazePAM **OR** diazePAM    fentaNYL citrate (PF)    glucagon (human recombinant)    lidocaine    Magnesium Standard Dose Replacement - Follow Nurse / BPA Driven Protocol     Morphine    nitroglycerin    ondansetron ODT **OR** ondansetron    oxyCODONE    Pharmacy to dose vancomycin    Pharmacy To Dose:    Phosphorus Replacement - Follow Nurse / BPA Driven Protocol    Potassium Replacement - Follow Nurse / BPA Driven Protocol    sodium chloride    sodium chloride    sodium chloride      Assessment & Plan       Assessment    Right septic knee arthritis  status post recent patellar replacement surgery..  CT of the lower extremity showed a large joint effusion with a large fluid collection under the subcutaneous tissues suspicious for abscess.  Status post aspiration by IR on  7/24/2025-60 mL of  purulent fluid aspirated from the right knee soft tissue fluid collection and 10 mL of bloody fluid and fluid aspirated from the right suprapatellar joint space.  Synovial fluid showed  177,000 total nucleated cells-predominantly neutrophils.  Cultures are pending    Status post  patellofemoral arthroplasty with prosthesis 4 weeks ago with Dr. Montoya at [facility].  Family states that he has had the surgery for misalignment of his patella    Positive blood culture in 1 out of 2 blood cultures from admission for Streptococcus species.  Final cultures are pending    Febrile illness-likely related to the above.  Urinalysis was unremarkable.  COVID-19 screen and respiratory viral anemia panel are negative    Reported slight confusion with headache at admission.  Patient is already been taken down for lumbar puncture on 7/24/2025.  He has no clinical signs of meningitis.  Lumbar puncture fluid workup was not significant for infection.  CT of the head showed no acute findings    EtOH abuse.  Patient admits to at least 30 beers a day    Type 2 diabetes-A1c is 6.61    History of idiopathic peripheral neuropathy    History of gout    Plan    Discontinue IV cefepime and IV Flagyl  Start ceftriaxone 2 g every 24 hours  Continue IV vancomycin-asked pharmacy to monitor and dose  Waiting on blood  cultures to finalize  Waiting on right knee fluid aspiration cultures  Orthopedic surgeon has seen the patient and is advising patient transferred back to [facility]  Will need a least a washout of the right knee if not hardware replacement and 6 weeks of IV antibiotics  Continue supportive care  Case discussed with patient and wife at bedside  Case discussed with RN  Case discussed with pharmacy  Tobacco abuse cessation    Emily Sotelo, APRN  07/24/25  17:53 EDT    Note is dictated utilizing voice recognition software/Dragon

## 2025-07-24 NOTE — CONSULTS
Primary Care Provider: Paras Worthy MD   Consult requested by: Dr. Lopes  Reason for Consultation: Neurological evaluation: Patient febrile, complaining of severe headache history of alcoholism   History taken from: patient chart RN  Chief complaint: body aches, fever, headache       SUBJECTIVE:    History of present illness: Background per H&P:  Timo Hector is a 49 y.o. male with a CMH of alcoholism DM2, peripheral neuropathy, HTN, HLD who presented to [facility] on 7/23/2025 with fatigue and lightheadedness     -Patient states they have been feeling unwell since returning home a vacation about 2 to 3 days ago.  Admits to some chills and tremors.  Admits that he has had significant drinks while out there and normally drinks at least 30 pack/day beer.  Patient states that he did not count while he was drinking on vacation.  Admits to having some sunburn.     Vitals are significant for fever 102.7, tachycardia, tachypnea  Labs significant for mild hyperglycemia, hyponatremia, lactic acidosis.  UA negative for obvious signs of infection at this time.Chest x-ray did not show any obvious signs of pneumonia.    - Portions of the above HPI were copied from previous encounters and edited as appropriate. PMH as detailed below.     Patient started having headache 3-4 days ago. He had knee surgery 4 weeks prior at [facility] by Dr. Montoya. He complaining of diffuse headache. No focal weakness, no vision changes. B/L foot and hand chronic peripheral neuropathy and he follows Neurology outpatient. No neck stiffness- no neck pain.       Review of Systems   Eyes:  Negative for visual disturbance.   Respiratory: Negative.     Cardiovascular: Negative.    Gastrointestinal:  Negative for nausea and vomiting.   Endocrine: Negative.    Genitourinary: Negative.    Musculoskeletal:  Positive for joint swelling. Arthralgias: right knee pain and swelling.  Skin: Negative.    Allergic/Immunologic: Negative.    Neurological:   Positive for weakness (generalized), numbness (chronic peripheral neuropathy) and headaches. Negative for dizziness, tremors, seizures, syncope, facial asymmetry, speech difficulty and light-headedness.   Hematological: Negative.    Psychiatric/Behavioral:  Negative for confusion.           PATIENT HISTORY:  Past Medical History:   Diagnosis Date    Abscess of leg, right 08/02/2017    Acute pharyngitis 03/15/2015    B12 deficiency 06/22/2018    Cellulitis 11/14/2014    Edema     Elbow pain 03/02/2015    Foot pain, left 09/20/2018    Gout 11/11/2014    Headache 02/21/2014    Hernia of abdominal cavity 03/02/2015    Hypertension 11/11/2014    Hypothyroidism 10/26/2017    Knee pain, right 04/15/2017    Laceration without foreign body of unspecified toe without damage to nail, initial encounter 06/22/2018    Leg pain, left     Lipoma of skin     Metatarsalgia, left foot 02/21/2019    Myalgia 10/26/2017    Obesity 02/21/2014    Obstructive sleep apnea 02/21/2014    Osteoarthritis 02/21/2014    Paresthesia 01/28/2016    Peripheral neuropathy 10/26/2017    Plantar fasciitis of right foot 01/28/2016    Pre-diabetes 06/22/2018    Renal insufficiency 06/22/2018    RUQ pain 06/01/2017    Sinusitis, acute 12/04/2015    Skin lesion 03/02/2015    Snoring     Sprain of tarsometatarsal ligament of left foot 01/11/2019    INITIAL ENCOUNTER    Stomatitis and mucositis 03/15/2015    (ULCERATIVE)    Unspecified fracture of left foot, initial encounter for closed fracture 09/20/2018    URI (upper respiratory infection) 01/15/2015    Vitamin B deficiency, unspecified 02/12/2016   ,   Past Surgical History:   Procedure Laterality Date    GALLBLADDER SURGERY      HERNIA REPAIR      KNEE SURGERY Right     PILONIDAL CYSTECTOMY     ,   Family History   Problem Relation Age of Onset    Cancer Mother     Hypertension Father     Osteoarthritis Father     No Known Problems Sister     Diabetes Brother    ,   Social History     Tobacco Use     Smoking status: Never     Passive exposure: Never    Smokeless tobacco: Current     Types: Chew   Vaping Use    Vaping status: Never Used   Substance Use Topics    Alcohol use: Not Currently     Comment: sober since 2010    Drug use: Never   ,   Prior to Admission medications    Medication Sig Start Date End Date Taking? Authorizing Provider   DULoxetine HCl (DRIZALMA) 30 MG capsule Take 2 capsules by mouth 2 (Two) Times a Day.    Provider, MD Christiane   levothyroxine (SYNTHROID, LEVOTHROID) 25 MCG tablet TAKE 1 TABLET BY MOUTH DAILY 7/24/25   Stacie Miller APRN   lisinopril-hydrochlorothiazide (PRINZIDE,ZESTORETIC) 20-25 MG per tablet TAKE 1 TABLET BY MOUTH DAILY 5/19/25   Stacie Miller APRN   metFORMIN ER (GLUCOPHAGE-XR) 500 MG 24 hr tablet Take 1 tablet by mouth Daily With Dinner for 180 days. 2/24/25 8/23/25  Stacie Miller APRN   rosuvastatin (Crestor) 10 MG tablet Take 1 tablet by mouth Daily. 2/24/25   Stacie Miller APRN   levothyroxine (SYNTHROID, LEVOTHROID) 25 MCG tablet TAKE 1 TABLET BY MOUTH DAILY 12/29/24 7/24/25  Stacie Miller APRN    Allergies:  Lyrica [pregabalin]    Current Facility-Administered Medications   Medication Dose Route Frequency Provider Last Rate Last Admin    acetaminophen (TYLENOL) tablet 1,000 mg  1,000 mg Oral Q6H PRN Myles Mondragon MD   1,000 mg at 07/24/25 0934    sennosides-docusate (PERICOLACE) 8.6-50 MG per tablet 2 tablet  2 tablet Oral BID PRN Myles Mondragon MD        And    polyethylene glycol (MIRALAX) packet 17 g  17 g Oral Daily PRN Myles Mondragon MD        And    bisacodyl (DULCOLAX) EC tablet 5 mg  5 mg Oral Daily PRN Myles Mondragon MD        And    bisacodyl (DULCOLAX) suppository 10 mg  10 mg Rectal Daily PRN Myles Mondragon MD        Calcium Replacement - Follow Nurse / BPA Driven Protocol   Not Applicable PRN Myles Mondragon MD        cefepime 2000 mg IVPB in 100 mL NS (MBP)  2,000 mg Intravenous Once Moses, MD Lee        cefepime 2000 mg IVPB in 100 mL NS (MBP)   2,000 mg Intravenous Q8H Lee Lopes MD        dextrose (D50W) (25 g/50 mL) IV injection 25 g  25 g Intravenous Q15 Min PRN Myles Mondragon MD        dextrose (GLUTOSE) oral gel 15 g  15 g Oral Q15 Min PRN Myles Mondragon MD        diazePAM (VALIUM) tablet 10 mg  10 mg Oral Q2H PRN Lee Lopes MD        Or    diazePAM (VALIUM) injection 10 mg  10 mg Intravenous Q2H PRN Lee Lopes MD        Or    diazePAM (VALIUM) tablet 15 mg  15 mg Oral Q2H PRN Lee Lopes MD        Or    diazePAM (VALIUM) injection 15 mg  15 mg Intravenous Q2H PRN Lee Lopes MD        Or    diazePAM (VALIUM) tablet 20 mg  20 mg Oral Q1H PRN Lee Lopes MD        Or    diazePAM (VALIUM) injection 20 mg  20 mg Intravenous Q1H PRN Lee Lopes MD        DULoxetine (CYMBALTA) DR capsule 60 mg  60 mg Oral Q12H Myles Mondragon MD   60 mg at 07/24/25 0934    enoxaparin sodium (LOVENOX) syringe 40 mg  40 mg Subcutaneous Daily Myles Mondragon MD        folic acid (FOLVITE) tablet 1 mg  1 mg Oral Daily Lee Lopes MD   1 mg at 07/24/25 0934    glucagon (GLUCAGEN) injection 1 mg  1 mg Intramuscular Q15 Min PRN Myles Mondragon MD        insulin lispro (HUMALOG/ADMELOG) injection 2-7 Units  2-7 Units Subcutaneous 4x Daily AC & at Bedtime Myles Mondragon MD   2 Units at 07/23/25 2240    lactated ringers infusion  75 mL/hr Intravenous Continuous Myles Mondragon MD 75 mL/hr at 07/23/25 2243 75 mL/hr at 07/23/25 2243    levothyroxine (SYNTHROID, LEVOTHROID) tablet 25 mcg  25 mcg Oral Q AM Myles Mondragon MD   25 mcg at 07/24/25 0526    Magnesium Standard Dose Replacement - Follow Nurse / BPA Driven Protocol   Not Applicable PRN Myles Mondragon MD        morphine injection 2 mg  2 mg Intravenous Q4H PRN Myles Mondragon MD   2 mg at 07/23/25 2241    mupirocin (BACTROBAN) 2 % nasal ointment 1 Application  1 Application Each Nare BID Lee Lopes MD   1 Application at 07/24/25 0612    nitroglycerin (NITROSTAT) SL tablet 0.4 mg  0.4 mg Sublingual Q5 Min PRN Myles Mondragon MD        ondansetron ODT  (ZOFRAN-ODT) disintegrating tablet 4 mg  4 mg Oral Q6H PRN Myles Mondragon MD        Or    ondansetron (ZOFRAN) injection 4 mg  4 mg Intravenous Q6H PRN Myles Mondragon MD        oxyCODONE (ROXICODONE) immediate release tablet 5 mg  5 mg Oral Q4H PRN Myles Mondragon MD   5 mg at 07/24/25 0208    Pharmacy to dose vancomycin   Not Applicable Continuous PRN Myles Mondragon MD        Pharmacy To Dose: Piperacillin-tazobactam (Zosyn)   Not Applicable Continuous PRN Myles Mondragon MD        Phosphorus Replacement - Follow Nurse / BPA Driven Protocol   Not Applicable PRN Myles Mondragon MD        Potassium Replacement - Follow Nurse / BPA Driven Protocol   Not Applicable PRMyles Serrato MD        rosuvastatin (CRESTOR) tablet 10 mg  10 mg Oral Daily Myles Mondragon MD   10 mg at 07/24/25 0934    sodium chloride 0.9 % flush 10 mL  10 mL Intravenous PRN Myles Mondragno MD        sodium chloride 0.9 % flush 10 mL  10 mL Intravenous Q12H Myles Mondragon MD   10 mL at 07/24/25 1038    sodium chloride 0.9 % flush 10 mL  10 mL Intravenous PRN Myles Mondragon MD        sodium chloride 0.9 % infusion 40 mL  40 mL Intravenous PRN Myles Mondragon MD        thiamine (B-1) injection 200 mg  200 mg Intravenous Q8H Lee Lopes MD        Followed by    [START ON 7/29/2025] thiamine (VITAMIN B-1) tablet 100 mg  100 mg Oral Daily Lee Lopes MD        Vancomycin HCl 1,250 mg in sodium chloride 0.9 % 250 mL VTB  1,250 mg Intravenous Q12H Myles Mondragon  mL/hr at 07/24/25 1039 1,250 mg at 07/24/25 1039        ________________________________________________________        OBJECTIVE:    PHYSICAL EXAM:    Constitutional: The patient appears ill. He is diaphoretic. There is no shortness of breath.     PSYCHIATRIC: Calm and cooperative  HEENT: There is no tenderness over the temporal arteries bilaterally. Normocephalic, atraumatic.   Chest: Breathing unlabored  Cardiac: Tachycardic-110s 120s  Extremities: Right knee edema and erythema    NEUROLOGICAL:    Cognition:   Fully  oriented.  Fund of knowledge decent.  Concentration and attention normal.   Language normal with normal comprehension, fluent speech, intact repetition and naming.   Short and long term memory appears intact    Cranial nerves;    II - pupils bilaterally equal reacting to light,  No new Visual field deficits;  Fundoscopic exam- Not able to be done, non-dilated exam  III,IV,VI: EOMI with no diplopia  V: Normal facial sensations  VII: No facial asymmetry,  VIII: No New hearing abnormality  IX, X, XI: normal gag and shoulder shrug;  XII: tongue is in the midline.    Sensory:  Intact to light touch in all extremities.     Motor: Strength 5/5 bilaterally upper and lower extremities. No involuntary movements present. Normal tone and bulk.    Cerebellar: Finger to nose and mirror movements normal bilaterally.    Gait and balance: Deferred.     Physical exam performed by CHANCE Zamora.  ________________________________________________________   RESULTS REVIEW:    VITAL SIGNS:   Temp:  [98.3 °F (36.8 °C)-103 °F (39.4 °C)] 99.1 °F (37.3 °C)  Heart Rate:  [112-137] 127  Resp:  [18-28] 28  BP: (102-149)/(53-98) 129/73     LABS:      Lab 07/24/25  1053 07/24/25  0601 07/23/25  1730 07/23/25  1528   WBC  --  11.22*  --  8.24   HEMOGLOBIN  --  11.1*  --  14.1   HEMATOCRIT  --  33.2*  --  41.6   PLATELETS  --  161  --  194   NEUTROS ABS  --  10.43*  --  7.46*   IMMATURE GRANS (ABS)  --   --   --  0.14*   LYMPHS ABS  --   --   --  0.32*   MONOS ABS  --   --   --  0.29   EOS ABS  --   --   --  0.01   MCV  --  90.5  --  90.2   CRP 39.20*  --   --   --    LACTATE 2.0  --  1.7 3.0*   PROTIME 17.0*  --   --   --    APTT 37.0*  --   --   --          Lab 07/24/25  1053 07/24/25  0450 07/23/25  1528   SODIUM  --  128* 132*   POTASSIUM 4.0 3.6 3.8   CHLORIDE  --  96* 94*   CO2  --  17.6* 24.0   ANION GAP  --  14.4 14.0   BUN  --  9.5 11.9   CREATININE  --  0.87 0.88   EGFR  --  105.8 105.4   GLUCOSE  --  175* 204*   CALCIUM  --  8.5* 10.1    TSH  --  1.420  --          Lab 07/23/25  1528   TOTAL PROTEIN 7.9   ALBUMIN 4.6   GLOBULIN 3.3   ALT (SGPT) 37   AST (SGOT) 35   BILIRUBIN 0.6   ALK PHOS 87         Lab 07/24/25  1053   PROTIME 17.0*   INR 1.38*                 UA          7/23/2025    16:05   Urinalysis   Squamous Epithelial Cells, UA 3-6    Specific Gravity, UA >=1.030    Ketones, UA Trace    Blood, UA Trace    Leukocytes, UA Negative    Nitrite, UA Negative    RBC, UA 0-2    WBC, UA 0-2    Bacteria, UA None Seen        Lab Results   Component Value Date    TSH 1.420 07/24/2025     (H) 02/21/2025    HGBA1C 6.50 (H) 02/21/2025    EITQUEBB19 >2,000 (H) 01/10/2024       IMAGING STUDIES:  CT Lower Extremity Right With Contrast  Result Date: 7/24/2025  Impression: There is a large joint effusion with small foci of gas. There is extension from the joint fluid through a defect in the lateral patellar retinaculum into the subcutaneous tissues in the prepatellar and infrapatellar location. Through this defect there is  extension of fluid and gas collection into the subcutaneous tissues. This measures 6.8 x 2.3 x 11.2 cm and is suspicious for abscess. Aspiration analysis of fluid is recommended. The connectivity with the joint fluid is suspicious for septic arthritis. No CT findings of osteomyelitis at this time. There is a patellofemoral compartment arthroplasty. Electronically Signed: Joellen Balderas MD  7/24/2025 10:31 AM EDT  Workstation ID: [id number]    CT Chest With Contrast Diagnostic  Result Date: 7/24/2025  1.No active disease in the chest. There is a stable 4 mm right upper lobe pulmonary nodule. In the absence of risk factors for malignancy, no follow-up is indicated. Otherwise, follow-up could be obtained in 12 months. 2.Hepatic steatosis. 3.Cholecystectomy without biliary obstruction. 4.Probable mild small bowel ileus without obstruction. Normal large bowel and appendix. 5.Normal nonobstructed kidneys. 6.Bilateral inguinal adenopathy,  possibly reactive. Continued follow-up recommended. No other adenopathy is identified in the chest, abdomen, or pelvis. Electronically Signed: Zachary Barakat MD  7/24/2025 2:36 AM EDT  Workstation ID: [id number]    CT Abdomen Pelvis With Contrast  Result Date: 7/24/2025  1.No active disease in the chest. There is a stable 4 mm right upper lobe pulmonary nodule. In the absence of risk factors for malignancy, no follow-up is indicated. Otherwise, follow-up could be obtained in 12 months. 2.Hepatic steatosis. 3.Cholecystectomy without biliary obstruction. 4.Probable mild small bowel ileus without obstruction. Normal large bowel and appendix. 5.Normal nonobstructed kidneys. 6.Bilateral inguinal adenopathy, possibly reactive. Continued follow-up recommended. No other adenopathy is identified in the chest, abdomen, or pelvis. Electronically Signed: Zachary Barakat MD  7/24/2025 2:36 AM EDT  Workstation ID: [id number]    XR Chest 1 View  Result Date: 7/23/2025  Impression: No acute cardiopulmonary findings. Electronically Signed: Jasper Dodson MD  7/23/2025 3:54 PM EDT  Workstation ID: [id number]    XR Knee 3 View Right  Result Date: 7/23/2025  Impression: Status post patellar resurfacing. Mild to moderate prepatellar soft tissue swelling. No acute osseous abnormality Electronically Signed: Pelon Bear MD  7/23/2025 3:54 PM EDT  Workstation ID: [id number]      I reviewed the patient's new clinical results.    ________________________________________________________     PROBLEM LIST:    Tachycardia            ASSESSMENT/PLAN:    *Sepsis   *Positive blood cultures   *Right knee abscess   *Febrile, tachycardia    CT head- pending  WBC 11.22, Lactate 1.7  Tmax 103.0  CRP 39.20  Ortho consulted- Recommend transfer to [facility]   ID consulted     Plan  IR for LP per primary team- CSF orders in- less likely CNS infection  Nurtec 75 mg sublingual x 1 for headache  Nothing else to add from inpatient neurology.  Headache is likely  secondary to infection.  If CSF shows any abnormality, recommending MRI brain with and without.       I discussed the patient's findings and my recommendations with patient, family, nursing staff, and ELISE Pineda  07/24/25  12:03 EDT

## 2025-07-24 NOTE — PROGRESS NOTES
[facility] MEDICINE SERVICE  DAILY PROGRESS NOTE    NAME: Timo Hector  : 1975  MRN: [id number]      LOS: 0 days     PROVIDER OF SERVICE: Lee Lopes MD    Chief Complaint: Tachycardia    Subjective:   Patient lying in bed.  Wife at the bedside..  H&P, labs and imaging reviewed  Interval History:    Patient seen and evaluated at bedside.   Patient complaining of severe headache.  Denies any chest pain nausea vomiting abdominal pain diarrhea constipation.  Has been having right knee pain for 4 days.  When on vacation and started having right knee pain about 4 days ago.  Treatment plan discussed with patient. All questions addressed.     Review of Systems:   All 21 ROS were negative except mentioned above.    Objective:     Vital Signs  Temp:  [98.2 °F (36.8 °C)-103 °F (39.4 °C)] 98.2 °F (36.8 °C)  Heart Rate:  [112-137] 112  Resp:  [18-28] 23  BP: (102-149)/(53-98) 119/82   Body mass index is 32.99 kg/m².    Physical Exam   General: No acute distress, appears stated age  Neuro: Awake and alert, oriented x3, no focal deficits appreciated  Head: Atraumatic, normocephalic  HEENT: EOMI, anicteric, normal sclerae and conjunctivae, moist mucus membranes  Neck: supple, no lymphadenopathy  CV: RRR, soft heart sounds, no murmurs appreciated, no peripheral edema  Pulm: Decreased breath sounds, no increased work of breathing, no adventitious sounds  Abd: Soft, nontender, nondistended  Skin: Warm, dry and right knee= positive erythema, warmth, edema and tenderness.  Decreased range of motion  Psych: Appropriate mood and affect    Scheduled Meds   cefepime, 2,000 mg, Intravenous, Once  cefepime, 2,000 mg, Intravenous, Q8H  DULoxetine, 60 mg, Oral, Q12H  enoxaparin sodium, 40 mg, Subcutaneous, Daily  folic acid, 1 mg, Oral, Daily  insulin lispro, 2-7 Units, Subcutaneous, 4x Daily AC & at Bedtime  levothyroxine, 25 mcg, Oral, Q AM  mupirocin, 1 Application, Each Nare, BID  rosuvastatin, 10 mg, Oral,  Daily  sodium chloride, 10 mL, Intravenous, Q12H  thiamine (B-1) IV, 200 mg, Intravenous, Q8H   Followed by  [START ON 7/29/2025] thiamine, 100 mg, Oral, Daily  vancomycin, 1,250 mg, Intravenous, Q12H       PRN Meds     acetaminophen    senna-docusate sodium **AND** polyethylene glycol **AND** bisacodyl **AND** bisacodyl    Calcium Replacement - Follow Nurse / BPA Driven Protocol    dextrose    dextrose    diazePAM **OR** diazePAM **OR** diazePAM **OR** diazePAM **OR** diazePAM **OR** diazePAM    glucagon (human recombinant)    Magnesium Standard Dose Replacement - Follow Nurse / BPA Driven Protocol    Morphine    nitroglycerin    ondansetron ODT **OR** ondansetron    oxyCODONE    Pharmacy to dose vancomycin    Pharmacy To Dose:    Phosphorus Replacement - Follow Nurse / BPA Driven Protocol    Potassium Replacement - Follow Nurse / BPA Driven Protocol    sodium chloride    sodium chloride    sodium chloride   Infusions  lactated ringers, 75 mL/hr, Last Rate: 75 mL/hr (07/23/25 3153)  Pharmacy to dose vancomycin,   Pharmacy To Dose:,           Diagnostic Data    Results from last 7 days   Lab Units 07/24/25  1053 07/24/25  0601 07/24/25  0450 07/23/25  1528   WBC 10*3/mm3  --  11.22*  --  8.24   HEMOGLOBIN g/dL  --  11.1*  --  14.1   HEMATOCRIT %  --  33.2*  --  41.6   PLATELETS 10*3/mm3  --  161  --  194   GLUCOSE mg/dL  --   --  175* 204*   CREATININE mg/dL  --   --  0.87 0.88   BUN mg/dL  --   --  9.5 11.9   SODIUM mmol/L  --   --  128* 132*   POTASSIUM mmol/L 4.0  --  3.6 3.8   AST (SGOT) U/L  --   --   --  35   ALT (SGPT) U/L  --   --   --  37   ALK PHOS U/L  --   --   --  87   BILIRUBIN mg/dL  --   --   --  0.6   ANION GAP mmol/L  --   --  14.4 14.0       CT Lower Extremity Right With Contrast  Result Date: 7/24/2025  Impression: There is a large joint effusion with small foci of gas. There is extension from the joint fluid through a defect in the lateral patellar retinaculum into the subcutaneous tissues in  the prepatellar and infrapatellar location. Through this defect there is  extension of fluid and gas collection into the subcutaneous tissues. This measures 6.8 x 2.3 x 11.2 cm and is suspicious for abscess. Aspiration analysis of fluid is recommended. The connectivity with the joint fluid is suspicious for septic arthritis. No CT findings of osteomyelitis at this time. There is a patellofemoral compartment arthroplasty. Electronically Signed: Joellen Balderas MD  7/24/2025 10:31 AM EDT  Workstation ID: [id number]    CT Chest With Contrast Diagnostic  Result Date: 7/24/2025  1.No active disease in the chest. There is a stable 4 mm right upper lobe pulmonary nodule. In the absence of risk factors for malignancy, no follow-up is indicated. Otherwise, follow-up could be obtained in 12 months. 2.Hepatic steatosis. 3.Cholecystectomy without biliary obstruction. 4.Probable mild small bowel ileus without obstruction. Normal large bowel and appendix. 5.Normal nonobstructed kidneys. 6.Bilateral inguinal adenopathy, possibly reactive. Continued follow-up recommended. No other adenopathy is identified in the chest, abdomen, or pelvis. Electronically Signed: Zachary Barakat MD  7/24/2025 2:36 AM EDT  Workstation ID: [id number]    CT Abdomen Pelvis With Contrast  Result Date: 7/24/2025  1.No active disease in the chest. There is a stable 4 mm right upper lobe pulmonary nodule. In the absence of risk factors for malignancy, no follow-up is indicated. Otherwise, follow-up could be obtained in 12 months. 2.Hepatic steatosis. 3.Cholecystectomy without biliary obstruction. 4.Probable mild small bowel ileus without obstruction. Normal large bowel and appendix. 5.Normal nonobstructed kidneys. 6.Bilateral inguinal adenopathy, possibly reactive. Continued follow-up recommended. No other adenopathy is identified in the chest, abdomen, or pelvis. Electronically Signed: Zachary Barakat MD  7/24/2025 2:36 AM EDT  Workstation ID: [id number]    XR Chest  1 View  Result Date: 7/23/2025  Impression: No acute cardiopulmonary findings. Electronically Signed: Jasper Dodson MD  7/23/2025 3:54 PM EDT  Workstation ID: [id number]    XR Knee 3 View Right  Result Date: 7/23/2025  Impression: Status post patellar resurfacing. Mild to moderate prepatellar soft tissue swelling. No acute osseous abnormality Electronically Signed: Pelon Bear MD  7/23/2025 3:54 PM EDT  Workstation ID: [id number]      Interval results reviewed.    Assessment/Plan:   Sepsis  Septic arthritis right with abscess  Severe headache  Alcoholism  Hyponatremia  Mild small bowel ileus without obstruction  Hepatic steatosis  Diabetes mellitus with peripheral neuropathy-A1c 6.5 in February 2025  Hypertension  Hypothyroidism  Hyperlipidemia  Status post right knee arthroplasty 4 weeks ago  Gout   Hypothyroidism  Obstructive sleep apnea  B12 deficiency  4 mm right pulmonary nodule    Blood cultures 1 out of 2 positive for gram-positive cocci in chains.  Patient was on vancomycin and Zosyn.  As patient complaining of severe headache will change Zosyn to cefepime 2 g every 8 hours.  Also add metronidazole 500 mg every 8 hours and continue vancomycin.  Levels as per pharmacy  Obtain ID and neurology consult  CT head pending    Appreciate orthopedics input recommendations noted.  Awaiting for transfer to [facility] for right knee septic arthritis, abscess and patellofemoral compartment arthroplasty.  Patient's right knee arthroplasty was performed by Dr. Montoya.    Obtain CT head with and without contrast and CT right usually large joint effusion with small foci of gas.   fluid and gas collection into the subcutaneous tissues..  There is patellofemoral compartment arthroplasty.    Orthopedics does not want to do any intervention, will ask radiology if they could aspirate abscess while waiting for his transfer.    [facility] protocol      Treatment plan discussed with RN.     VTE Prophylaxis:  Pharmacologic VTE  prophylaxis orders are present.         Code status is   Code Status and Medical Interventions: CPR (Attempt to Resuscitate); Full Support   Ordered at: 07/23/25 2021     Code Status (Patient has no pulse and is not breathing):    CPR (Attempt to Resuscitate)     Medical Interventions (Patient has pulse or is breathing):    Full Support     Level Of Support Discussed With:    Patient       Plan for disposition:     Barriers to Discharge: Awaiting transfer to [facility]  Anticipated Date of Discharge: Awaiting transfer  Place of Discharge: [facility]      Time: 40 minutes     Signature: Electronically signed by Lee Lopes MD, 07/24/25, 12:25 EDT.  [facility] Hospitalist Team

## 2025-07-24 NOTE — POST-PROCEDURE NOTE
Procedure: Lumbar Puncture      Pre Op DX:AMS      Post Op DX:AMS      Anesthesia: local anesthetic      Findings:Successful diagnostic lumbar puncture utilizing fluoroscopic guidance. A total of 9mL clear colorless CSF was collected for lab examination per the ordering clinician.       Complications:The patient tolerated the procedure well and without immediate complication. Post-procedure orders placed.       Provider Signature: ELSIE Garrett

## 2025-07-24 NOTE — PROGRESS NOTES
"Pharmacy Antimicrobial Dosing Service    Subjective:  Timo Hector is a 49 y.o.male admitted with tachycardia. Pharmacy has been consulted to dose Vancomycin and Piperacillin/Tazobactam for empiric coverage.    PMH:       Assessment/Plan    1. Day #1 Vancomycin: Goal -600 mcg*h/mL. Loaded patient with vancomycin 2 g (~20 mg/kg ABW) IV x1. Follow with vanc 1.25 g (~13 mg/kg ABW) IV q12h for a predicted AUC of 480 mcg*h/mL. Obtain a trough level on 7/25 at 08:00.    2. Day #1 Piperacillin/Tazobactam: 3.375 g IV q8h for estCrCl > 20 mL/min.    Will continue to monitor drug levels, renal function, culture and sensitivities, and patient clinical status.       Objective:  Relevant clinical data and objective history reviewed:  174 cm (68.5\")   99.9 kg (220 lb 3.2 oz)   Ideal body weight: 69.6 kg (153 lb 5.3 oz)  Adjusted ideal body weight: 81.7 kg (180 lb 1.2 oz)  Body mass index is 32.99 kg/m².        Results from last 7 days   Lab Units 07/23/25  1528   CREATININE mg/dL 0.88     Estimated Creatinine Clearance: 117.3 mL/min (by C-G formula based on SCr of 0.88 mg/dL).  I/O last 3 completed shifts:  In: 2000 [IV Piggyback:2000]  Out: -     Results from last 7 days   Lab Units 07/23/25  1528   WBC 10*3/mm3 8.24     Temperature    07/23/25 1356 07/23/25 2021 07/24/25 0021   Temp: 98.3 °F (36.8 °C) (!) 102.7 °F (39.3 °C) (!) 103 °F (39.4 °C)     Baseline culture/source/susceptibility:  Microbiology Results (last 10 days)       Procedure Component Value - Date/Time    Respiratory Panel PCR w/COVID-19(SARS-CoV-2) MENYD/BERTHA/RANCHO/PAD/COR/JERROD In-House, NP Swab in UTM/VTM, 2 HR TAT - Swab, Nasopharynx [[id number]]  (Normal) Collected: 07/23/25 1529    Lab Status: Final result Specimen: Swab from Nasopharynx Updated: 07/23/25 1103     ADENOVIRUS, PCR Not Detected     Coronavirus 229E Not Detected     Coronavirus HKU1 Not Detected     Coronavirus NL63 Not Detected     Coronavirus OC43 Not Detected     COVID19 Not Detected "     Human Metapneumovirus Not Detected     Human Rhinovirus/Enterovirus Not Detected     Influenza A PCR Not Detected     Influenza B PCR Not Detected     Parainfluenza Virus 1 Not Detected     Parainfluenza Virus 2 Not Detected     Parainfluenza Virus 3 Not Detected     Parainfluenza Virus 4 Not Detected     RSV, PCR Not Detected     Bordetella pertussis pcr Not Detected     Bordetella parapertussis PCR Not Detected     Chlamydophila pneumoniae PCR Not Detected     Mycoplasma pneumo by PCR Not Detected    Narrative:      In the setting of a positive respiratory panel with a viral infection PLUS a negative procalcitonin without other underlying concern for bacterial infection, consider observing off antibiotics or discontinuation of antibiotics and continue supportive care. If the respiratory panel is positive for atypical bacterial infection (Bordetella pertussis, Chlamydophila pneumoniae, or Mycoplasma pneumoniae), consider antibiotic de-escalation to target atypical bacterial infection.    COVID-19 and FLU A/B PCR, 1 HR TAT - Swab, Nasopharynx [[id number]]  (Normal) Collected: 07/23/25 1406    Lab Status: Final result Specimen: Swab from Nasopharynx Updated: 07/23/25 1428     COVID19 Not Detected     Influenza A PCR Not Detected     Influenza B PCR Not Detected            Hyun Jennings PharmD, BCPS  7/24/2025 01:35 EDT

## 2025-07-24 NOTE — CASE MANAGEMENT/SOCIAL WORK
Discharge Planning Assessment  [facility]     Patient Name: Timo Hector  MRN: [id number]  Today's Date: 7/24/2025    Admit Date: 7/23/2025    Plan: DC Plan: Anticipate routine home with spouse pending clinical course. Spouse to provide transport.   Discharge Needs Assessment       Row Name 07/24/25 1232       Living Environment    People in Home spouse    Name(s) of People in Home Jesusita Krunal    Current Living Arrangements home    Potentially Unsafe Housing Conditions none    In the past 12 months has the electric, gas, oil, or water company threatened to shut off services in your home? No    Primary Care Provided by self  Spouse assists if needed.    Provides Primary Care For no one    Family Caregiver if Needed spouse    Family Caregiver Names Jesusita Hector    Quality of Family Relationships helpful;involved;supportive    Able to Return to Prior Arrangements yes       Resource/Environmental Concerns    Resource/Environmental Concerns none    Transportation Concerns none       Transportation Needs    In the past 12 months, has lack of transportation kept you from medical appointments or from getting medications? no    In the past 12 months, has lack of transportation kept you from meetings, work, or from getting things needed for daily living? No       Food Insecurity    Within the past 12 months, you worried that your food would run out before you got the money to buy more. Never true    Within the past 12 months, the food you bought just didn't last and you didn't have money to get more. Never true       Transition Planning    Patient/Family Anticipates Transition to home with family    Patient/Family Anticipated Services at Transition none    Transportation Anticipated car, drives self;family or friend will provide       Discharge Needs Assessment    Readmission Within the Last 30 Days no previous admission in last 30 days    Equipment Currently Used at Home cpap;cane, straight;shower chair;other (see  comments)  Raised Toilet Seat    Concerns to be Addressed discharge planning    Do you want help finding or keeping work or a job? I do not need or want help    Do you want help with school or training? For example, starting or completing job training or getting a high school diploma, GED or equivalent No    Anticipated Changes Related to Illness none    Equipment Needed After Discharge none    Provided Post Acute Provider List? N/A    Provided Post Acute Provider Quality & Resource List? N/A    Current Discharge Risk physical impairment;chronically ill                   Discharge Plan       Row Name 07/24/25 1233       Plan    Plan DC Plan: Anticipate routine home with spouse pending clinical course. Spouse to provide transport.    Patient/Family in Agreement with Plan yes    Provided Post Acute Provider List? N/A    Provided Post Acute Provider Quality & Resource List? N/A    Plan Comments CM spoke with patient at bedside to discuss admission assessment and discharge planning. Patient confirms PCP and pharmacy. Patient confirms he is agreeable to meds to bed program at this time. CM updated pharmacy in [employer]. Patient denies any difficulty affording medications or food at this time. Patient denies any additional needs for services or DME at this time. Uses Cpap, cane, shower chair, and raised toilet seat at home. Patient reports independent with ADL's at a moderate level and drives. Patient spouse will provide transportation when ready for DC.CM spoke with Hospitalist and nursing for morning rounds and reviewed chart for clinical updates. Awaiting Orthopedic and Infectious Disease recommendations. Patient may require [facility] for continued treatment if Arthroscopy complication is determined per Dr. Lopes. Otherwise DC anticipated in 3 days or so. SW following for ETOH abuse report. CM will continue to follow for any additional needs. DC Barriers: Cardiac monitoring, IV abx X2, IV Thiamine, Hyponatremia,  Pending results CT Lower extremity and LP, Pending consults, and monitor labs.               Expected Discharge Date and Time       Expected Discharge Date Expected Discharge Time    Jul 27, 2025            Demographic Summary       Row Name 07/24/25 1229       General Information    Admission Type inpatient    Arrived From home;urgent care    Referral Source admission list    Reason for Consult discharge planning    Preferred Language English       Contact Information    Permission Granted to Share Info With                    Functional Status       Row Name 07/24/25 1229       Functional Status    Usual Activity Tolerance moderate    Current Activity Tolerance moderate       Physical Activity    On average, how many days per week do you engage in moderate to strenuous exercise (like a brisk walk)? 0 days    On average, how many minutes do you engage in exercise at this level? 0 min    Number of minutes of exercise per week 0       Functional Status, IADL    Medications independent    Meal Preparation assistive person    Housekeeping assistive person    Laundry assistive person    Shopping independent    If for any reason you need help with day-to-day activities such as bathing, preparing meals, shopping, managing finances, etc., do you get the help you need? I get all the help I need       Mental Status    General Appearance WDL WDL       Mental Status Summary    Recent Changes in Mental Status/Cognitive Functioning no changes       Employment/    Employment Status unemployed    Current or Previous Occupation not applicable                 Ernestine Fuentes RN    Office Phone: [phone]  Office Cell:     [phone]

## 2025-07-24 NOTE — POST-PROCEDURE NOTE
Procedure: US guided Right knee aspiration.       Pre Op DX:Right knee pain and swelling      Post Op DX:Right knee pain and swelling      Anesthesia: local anesthetic      Findings:Successful ultrasound guided aspiration of right knee yielding 60mL of purulent fluid from the subcutaneous fluid collection and 10mL bloody purulent fluid from the suprapatellar joint space. Specimens were collected for lab examination.       Complications:The patient tolerated the procedure well and without immediate complication. Post-procedure orders placed.       Provider Signature: ELSIE Garrett

## 2025-07-25 VITALS
HEIGHT: 69 IN | BODY MASS INDEX: 32.61 KG/M2 | WEIGHT: 220.2 LBS | DIASTOLIC BLOOD PRESSURE: 68 MMHG | TEMPERATURE: 98.5 F | OXYGEN SATURATION: 93 % | SYSTOLIC BLOOD PRESSURE: 113 MMHG | HEART RATE: 109 BPM | RESPIRATION RATE: 21 BRPM

## 2025-07-25 PROBLEM — F10.939 ALCOHOL WITHDRAWAL: Status: ACTIVE | Noted: 2025-07-25

## 2025-07-25 LAB
ANION GAP SERPL CALCULATED.3IONS-SCNC: 14.5 MMOL/L (ref 5–15)
BUN SERPL-MCNC: 9.8 MG/DL (ref 6–20)
BUN/CREAT SERPL: 11.4 (ref 7–25)
CALCIUM SPEC-SCNC: 8.9 MG/DL (ref 8.6–10.5)
CHLORIDE SERPL-SCNC: 92 MMOL/L (ref 98–107)
CO2 SERPL-SCNC: 18.5 MMOL/L (ref 22–29)
CREAT SERPL-MCNC: 0.86 MG/DL (ref 0.76–1.27)
DEPRECATED RDW RBC AUTO: 43.7 FL (ref 37–54)
EGFRCR SERPLBLD CKD-EPI 2021: 106.1 ML/MIN/1.73
ERYTHROCYTE [DISTWIDTH] IN BLOOD BY AUTOMATED COUNT: 13.3 % (ref 12.3–15.4)
GLUCOSE BLDC GLUCOMTR-MCNC: 126 MG/DL (ref 70–105)
GLUCOSE SERPL-MCNC: 131 MG/DL (ref 65–99)
HCT VFR BLD AUTO: 33.8 % (ref 37.5–51)
HGB BLD-MCNC: 11.4 G/DL (ref 13–17.7)
LARGE PLATELETS: ABNORMAL
LYMPHOCYTES # BLD MANUAL: 0.94 10*3/MM3 (ref 0.7–3.1)
LYMPHOCYTES NFR BLD MANUAL: 3 % (ref 5–12)
MCH RBC QN AUTO: 30 PG (ref 26.6–33)
MCHC RBC AUTO-ENTMCNC: 33.7 G/DL (ref 31.5–35.7)
MCV RBC AUTO: 88.9 FL (ref 79–97)
MONOCYTES # BLD: 0.31 10*3/MM3 (ref 0.1–0.9)
NEUTROPHILS # BLD AUTO: 9.22 10*3/MM3 (ref 1.7–7)
NEUTROPHILS NFR BLD MANUAL: 65 % (ref 42.7–76)
NEUTS BAND NFR BLD MANUAL: 23 % (ref 0–5)
NEUTS VAC BLD QL SMEAR: ABNORMAL
NIGHT BLUE STAIN TISS: NORMAL
PLATELET # BLD AUTO: 179 10*3/MM3 (ref 140–450)
PMV BLD AUTO: 10 FL (ref 6–12)
POTASSIUM SERPL-SCNC: 4 MMOL/L (ref 3.5–5.2)
RBC # BLD AUTO: 3.8 10*6/MM3 (ref 4.14–5.8)
RBC MORPH BLD: NORMAL
SCAN SLIDE: NORMAL
SODIUM SERPL-SCNC: 125 MMOL/L (ref 136–145)
VANCOMYCIN TROUGH SERPL-MCNC: 6.33 MCG/ML (ref 5–20)
VARIANT LYMPHS NFR BLD MANUAL: 9 % (ref 19.6–45.3)
WBC NRBC COR # BLD AUTO: 10.48 10*3/MM3 (ref 3.4–10.8)

## 2025-07-25 PROCEDURE — 80048 BASIC METABOLIC PNL TOTAL CA: CPT | Performed by: INTERNAL MEDICINE

## 2025-07-25 PROCEDURE — 85025 COMPLETE CBC W/AUTO DIFF WBC: CPT | Performed by: INTERNAL MEDICINE

## 2025-07-25 PROCEDURE — 82948 REAGENT STRIP/BLOOD GLUCOSE: CPT | Performed by: INTERNAL MEDICINE

## 2025-07-25 PROCEDURE — 96376 TX/PRO/DX INJ SAME DRUG ADON: CPT

## 2025-07-25 PROCEDURE — G0378 HOSPITAL OBSERVATION PER HR: HCPCS

## 2025-07-25 PROCEDURE — 80202 ASSAY OF VANCOMYCIN: CPT | Performed by: INTERNAL MEDICINE

## 2025-07-25 PROCEDURE — 25010000002 THIAMINE PER 100 MG: Performed by: INTERNAL MEDICINE

## 2025-07-25 PROCEDURE — 85007 BL SMEAR W/DIFF WBC COUNT: CPT | Performed by: INTERNAL MEDICINE

## 2025-07-25 RX ORDER — LANOLIN ALCOHOL/MO/W.PET/CERES
100 CREAM (GRAM) TOPICAL DAILY
Start: 2025-07-29

## 2025-07-25 RX ORDER — DIAZEPAM 10 MG/1
10 TABLET ORAL
Start: 2025-07-25 | End: 2025-07-29

## 2025-07-25 RX ORDER — ENOXAPARIN SODIUM 100 MG/ML
40 INJECTION SUBCUTANEOUS DAILY
Start: 2025-07-25

## 2025-07-25 RX ORDER — DULOXETIN HYDROCHLORIDE 60 MG/1
60 CAPSULE, DELAYED RELEASE ORAL EVERY 12 HOURS SCHEDULED
Start: 2025-07-25

## 2025-07-25 RX ORDER — MORPHINE SULFATE 2 MG/ML
2 INJECTION, SOLUTION INTRAMUSCULAR; INTRAVENOUS EVERY 4 HOURS PRN
Start: 2025-07-25 | End: 2025-07-28

## 2025-07-25 RX ADMIN — LEVOTHYROXINE SODIUM 25 MCG: 0.03 TABLET ORAL at 05:53

## 2025-07-25 RX ADMIN — OXYCODONE 5 MG: 5 TABLET ORAL at 05:53

## 2025-07-25 RX ADMIN — THIAMINE HYDROCHLORIDE 200 MG: 100 INJECTION, SOLUTION INTRAMUSCULAR; INTRAVENOUS at 05:53

## 2025-07-25 NOTE — CASE MANAGEMENT/SOCIAL WORK
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 Yes  If this a hosp-hosp transfer? Yes, describe services needed at 2nd facility not available at 1st facility Continuity of Care with Orthopedic Surgeon. Post Arthroscopy Complication.  Is this a hospice patient? No    MEDICAL NECESSITY QUESTIONAIRE    Ambulance Transportation is medically necessary only if other means of transportation are contraindicated or would be potentially harmful to the patient.  To meet this requirement, the patient must be either \"bed confined\" or suffer from a condition such that transport by means other than an ambulance is contraindicated by the patient's condition.  The following questions must be answered by the healthcare professional signing below for this form to be valid:     1) Describe the MEDICAL CONDITION (physical and/or mental) of this patient AT THE TIME OF AMBULANCE TRANSPORT that requires the patient to be transported in an ambulance, and why transport by other means is contraindicated by the patient's condition: Hospital to Hospital Transfer, Cardiac Monitoring, Septic Joint  Past Medical History:   Diagnosis Date    Abscess of leg, right 08/02/2017    Acute pharyngitis 03/15/2015    B12 deficiency 06/22/2018    Cellulitis 11/14/2014    Edema     Elbow pain 03/02/2015    Foot pain, left 09/20/2018    Gout 11/11/2014    Headache 02/21/2014    Hernia of abdominal cavity 03/02/2015    Hypertension 11/11/2014    Hypothyroidism 10/26/2017 " "   Knee pain, right 04/15/2017    Laceration without foreign body of unspecified toe without damage to nail, initial encounter 06/22/2018    Leg pain, left     Lipoma of skin     Metatarsalgia, left foot 02/21/2019    Myalgia 10/26/2017    Obesity 02/21/2014    Obstructive sleep apnea 02/21/2014    Osteoarthritis 02/21/2014    Paresthesia 01/28/2016    Peripheral neuropathy 10/26/2017    Plantar fasciitis of right foot 01/28/2016    Pre-diabetes 06/22/2018    Renal insufficiency 06/22/2018    RUQ pain 06/01/2017    Sinusitis, acute 12/04/2015    Skin lesion 03/02/2015    Snoring     Sprain of tarsometatarsal ligament of left foot 01/11/2019    INITIAL ENCOUNTER    Stomatitis and mucositis 03/15/2015    (ULCERATIVE)    Unspecified fracture of left foot, initial encounter for closed fracture 09/20/2018    URI (upper respiratory infection) 01/15/2015    Vitamin B deficiency, unspecified 02/12/2016      Past Surgical History:   Procedure Laterality Date    GALLBLADDER SURGERY      HERNIA REPAIR      KNEE SURGERY Right     PILONIDAL CYSTECTOMY        2) Is this patient \"bed confined\" as defined below?No    To be \"bed confined\" the patient must satisfy all three of the following criteria:  (1) unable to get up from bed without assistance; AND (2) unable to ambulate;  AND (3) unable to sit in a chair or wheelchair.  3) Can this patient safely be transported by car or wheelchair van (I.e., may safely sit during transport, without an attendant or monitoring?)No   4. In addition to completing questions 1-3 above, please check any of the following conditions that apply*:          *Note: supporting documentation for any boxes checked must be maintained in the patient's medical records Moderate/severe pain on movement, Medical attendant required, and Cardiac monitoring required en route      SIGNATURE OF PHYSICIAN OR OTHER AUTHORIZED HEALTHCARE PROFESSIONAL    I certify that the above information is true and correct based on my " evaluation of this patient, and represent that the patient requires transport by ambulance and that other forms of transport are contraindicated.  I understand that this information will be used by the Centers for Medicare and Medicaid Services (CMS) to support the determiniation of medical necessity for ambulance services, and I represent that I have personal knowledge of the patient's condition at the time of transport.       If this box is checked, I also certify that the patient is physically or mentally incapable of signing the ambulance service's claim form and that the institution with which I am affiliated has furnished care, services or assistance to the patient.  My signature below is made on behalf of the patient pursuant to 42 .36(b)(4). In accordance with 42 .37, the specific reason(s) that the patient is physically or mentally incapable of signing the claim for is as follows:     Signature of Physician or Healthcare Professional  Date/Time:   7/25/2025 08:28 AM     (For Scheduled repetitive transport, this form is not valid for transports performed more than 60 days after this date).       Dr. Lee Lopes per Ernestine Fuentes RN/[facility]                                                                                                                                     --------------------------------------------------------------------------------------------  Printed Name and Credentials of Physician or Authorized Healthcare Professional     *Form must be signed by patient's attending physician for scheduled, repetitive transports,.  For non-repetitive ambulance transports, if unable to obtain the signature of the attending physician, any of the following may sign (please select below):     Physician  Clinical Nurse Specialist  Registered Nurse x    Physician Assistant  Discharge Planner x Licensed Practical Nurse     Nurse Practitioner   x

## 2025-07-25 NOTE — DISCHARGE SUMMARY
"             [facility] Medicine Services  Discharge Summary    Date of Service: 2025  Patient Name: Timo Hector  : 1975  MRN: [id number]    Date of Admission: 2025  Discharge Diagnosis: Tachycardia  Date of Discharge: 2025  Primary Care Physician: Paras Worthy MD    Presenting Problem:   Tachycardia [R00.0]  Septic arthritis of knee, right [M00.9]    Active and Resolved Hospital Problems:  Active Hospital Problems    Diagnosis POA    **Tachycardia [R00.0] Yes    Alcohol withdrawal [F10.939] Unknown      Resolved Hospital Problems   No resolved problems to display.         Hospital Course     HPI:  \"Timo Hector is a 49 y.o. male with a CMH of alcoholism DM2, peripheral neuropathy, HTN, HLD who presented to [facility] on 2025 with fatigue and lightheadedness     -Patient states they have been feeling unwell since returning home a vacation about 2 to 3 days ago.  Admits to some chills and tremors.  Admits that he has had significant drinks while out there and normally drinks at least 30 pack/day beer.  Patient states that he did not count while he was drinking on vacation.  Admits to having some sunburn.\"    Hospital Course:  Patient was admitted to the hospital  Blood cultures 1 out of 2 positive for gram-positive cocci in chains.  Patient was started on vancomycin and Zosyn.  ID consult was obtained as well as neurology to rule out meningitis.     ID discontinued Zosyn and cefepime and started him on ceftriaxone.  Patient also went for arthrocentesis and aspiration of pus.     Appreciate orthopedics input recommendations noted.  Awaiting for transfer to [facility] for right knee septic arthritis, abscess and patellofemoral compartment arthroplasty.  Patient's right knee arthroplasty was performed by Dr. Montoya.     Obtain CT head with and without contrast and CT right usually large joint effusion with small foci of gas.   fluid and gas collection " "into the subcutaneous tissues..  There is patellofemoral compartment arthroplasty.     Orthopedics does not want to do any intervention,  IRradiology aspirated abscess while waiting for his transfer.   As per ID:  \"Right septic knee arthritis  status post recent patellar replacement surgery..  CT of the lower extremity showed a large joint effusion with a large fluid collection under the subcutaneous tissues suspicious for abscess.  Status post aspiration by IR on  7/24/2025-60 mL of  purulent fluid aspirated from the right knee soft tissue fluid collection and 10 mL of bloody fluid and fluid aspirated from the right suprapatellar joint space.  Fluid differential showed over 177,000 total nucleated cells-predominantly neutrophils     Status post  patellofemoral arthroplasty with prosthesis 4 weeks ago with Dr. Montoya at [facility].  Family states that he has had the surgery for misalignment of his patella     Positive blood culture in 1 out of 2 blood cultures from admission for Streptococcus species.  If this is a group C or G it may be related to the right knee infection otherwise it could possibly be contamination.  Patient denies coming in with a line, port or any history of cardiac valve  replacement or cardiac device placement     Febrile illness-likely related to the above.  Urinalysis was unremarkable.  COVID-19 screen and respiratory viral anemia panel are negative     Reported slight confusion with headache at admission.  Patient is already been taken down for lumbar puncture on 7/24/2025.  He has no clinical signs of meningitis.  Lumbar puncture fluid workup was not significant for infection.  CT of the head showed no acute findings     EtOH abuse.  Patient admits to at least 30 beers a day.  Started on CIWA protocol     Type 2 diabetes-A1c is 6.61     History of idiopathic peripheral neuropathy     History of gout     Continue IV vancomycin-asked pharmacy to monitor and dose  Waiting on " "blood cultures to finalize  Waiting on right knee fluid aspiration cultures  Orthopedic surgeon has seen the patient and is advising patient transferred back to Dr. Ma at [facility].  Will need a least a washout of the right knee if not hardware replacement and 6 weeks of IV antibiotics,\"  Lumbar puncture was done which was negative for meningitis varicella-zoster virus was not detected CSF culture no organisms seen, meningitis encephalitis panel PCR on CSF was negative CSF AFB culture pending.  CSF glucose was 98 and CSF total protein 41  Blood culture positive for Streptococcus species not MARINO or pneumoniae.   right synovial fluid knee aspirate showed gram-positive cocci intracellular in chains in cluster  CIWA protocol    Patient will be transferred to [facility] under hospitalist care and hospitalist will consult orthopedics Dr ma-who performed his right knee arthroplasty 4 weeks ago and ID.  DISCHARGE Follow Up Recommendations for labs and diagnostics:   Follow-up with PCP     Reasons For Change In Medications and Indications for New Medications:  Vancomycin  Ceftriaxone  Diazepam    Day of Discharge     Vital Signs:  Temp:  [97.8 °F (36.6 °C)-100.4 °F (38 °C)] 98.5 °F (36.9 °C)  Heart Rate:  [110-133] 110  Resp:  [17-26] 21  BP: ()/(49-82) 138/77    Physical Exam:  Vitals and nursing note reviewed.   Constitutional:       Appearance: Normal appearance.   HENT:      Head: Normocephalic and atraumatic.   Cardiovascular:      Rate and Rhythm: Normal rate and rhythm.      Heart sounds: Normal heart sounds.   Pulmonary:      Effort: Pulmonary effort is normal.      Breath sounds: Decreased breath sounds.   Abdominal:      Palpations: Abdomen is soft.   Musculoskeletal:      Cervical back: Neck supple.   Neurological:      Mental Status: Mental status is at baseline.   Right knee erythematous, edematous and tender, decreased range of motion  Pertinent  and/or Most Recent Results     LAB RESULTS:    "   Lab 07/25/25  0421 07/24/25  1053 07/24/25  0601 07/23/25  1730 07/23/25  1528   WBC 10.48  --  11.22*  --  8.24   HEMOGLOBIN 11.4*  --  11.1*  --  14.1   HEMATOCRIT 33.8*  --  33.2*  --  41.6   PLATELETS 179  --  161  --  194   NEUTROS ABS 9.22*  --  10.43*  --  7.46*   IMMATURE GRANS (ABS)  --   --   --   --  0.14*   LYMPHS ABS  --   --   --   --  0.32*   MONOS ABS  --   --   --   --  0.29   EOS ABS  --   --   --   --  0.01   MCV 88.9  --  90.5  --  90.2   CRP  --  39.20*  --   --   --    LACTATE  --  2.0  --  1.7 3.0*   PROTIME  --  17.0*  --   --   --    APTT  --  37.0*  --   --   --          Lab 07/25/25  0421 07/24/25  1053 07/24/25  0601 07/24/25  0450 07/23/25  1528   SODIUM 125*  --   --  128* 132*   POTASSIUM 4.0 4.0  --  3.6 3.8   CHLORIDE 92*  --   --  96* 94*   CO2 18.5*  --   --  17.6* 24.0   ANION GAP 14.5  --   --  14.4 14.0   BUN 9.8  --   --  9.5 11.9   CREATININE 0.86  --   --  0.87 0.88   EGFR 106.1  --   --  105.8 105.4   GLUCOSE 131*  --   --  175* 204*   CALCIUM 8.9  --   --  8.5* 10.1   HEMOGLOBIN A1C  --   --  6.61*  --   --    TSH  --   --   --  1.420  --          Lab 07/23/25  1528   TOTAL PROTEIN 7.9   ALBUMIN 4.6   GLOBULIN 3.3   ALT (SGPT) 37   AST (SGOT) 35   BILIRUBIN 0.6   ALK PHOS 87         Lab 07/24/25  1053   PROTIME 17.0*   INR 1.38*                 Brief Urine Lab Results  (Last result in the past 365 days)        Color   Clarity   Blood   Leuk Est   Nitrite   Protein   CREAT   Urine HCG        07/23/25 1605 [place]   Cloudy   Trace   Negative   Negative   >=300 mg/dL (3+)                 Microbiology Results (last 10 days)       Procedure Component Value - Date/Time    Culture, CSF - Cerebrospinal Fluid, Lumbar Puncture [[id number]] Collected: 07/24/25 1502    Lab Status: Preliminary result Specimen: Cerebrospinal Fluid from Lumbar Puncture Updated: 07/24/25 0013     Gram Stain No organisms seen    Meningitis / Encephalitis Panel, PCR - Cerebrospinal Fluid, Lumbar Puncture  [[id number]]  (Normal) Collected: 07/24/25 1502    Lab Status: Final result Specimen: Cerebrospinal Fluid from Lumbar Puncture Updated: 07/24/25 1729     ESCHERICHIA COLI K1, PCR Not Detected     HAEMOPHILUS INFLUENZAE, PCR Not Detected     LISTERIA MONOCYTOGENES, PCR Not Detected     NEISSERIA MENINGITIDIS, PCR Not Detected     STREPTOCOCCUS AGALACTIAE, PCR Not Detected     STREPTOCOCCUS PNEUMONIAE, PCR Not Detected     CYTOMEGALOVIRUS (CMV), PCR Not Detected     ENTEROVIRUS, PCR Not Detected     HERPES SIMPLEX VIRUS 1 (HSV-1), PCR Not Detected     HERPES SIMPLEX VIRUS 2 (HSV-2), PCR Not Detected     HUMAN PARECHOVIRUS, PCR Not Detected     VARICELLA ZOSTER VIRUS (VZV), PCR Not Detected     CRYPTOCOCCUS NEOFORMANS / GATTII, PCR Not Detected     HUMAN HERPES VIRUS 6 PCR Not Detected    Body Fluid Culture - Synovial Fluid, Knee, Right [[id number]]  (Abnormal) Collected: 07/24/25 1429    Lab Status: Preliminary result Specimen: Synovial Fluid from Knee, Right Updated: 07/25/25 0845     Body Fluid Culture Light growth (2+) Gram Positive Cocci     Gram Stain Many (4+) WBCs per low power field      Few (2+) Gram positive cocci, intracellular, in chains and clusters    MRSA Screen, PCR (Inpatient) - Swab, Nares [[id number]]  (Normal) Collected: 07/24/25 0533    Lab Status: Final result Specimen: Swab from Nares Updated: 07/24/25 0724     MRSA PCR No MRSA Detected    Narrative:      The negative predictive value of this diagnostic test is high and should only be used to consider de-escalating anti-MRSA therapy. A positive result may indicate colonization with MRSA and must be correlated clinically.    Blood Culture - Blood, Arm, Right [[id number]]  (Abnormal) Collected: 07/23/25 1605    Lab Status: Preliminary result Specimen: Blood from Arm, Right Updated: 07/25/25 0646     Blood Culture Gram Positive Cocci     Isolated from Anaerobic Bottle     Gram Stain Anaerobic Bottle Gram positive cocci in chains    Blood Culture ID,  PCR - Blood, Arm, Right [[id number]]  (Abnormal) Collected: 07/23/25 1605    Lab Status: Final result Specimen: Blood from Arm, Right Updated: 07/24/25 1100     BCID, PCR Streptococcus spp, not A, B, or pneumoniae. Identification by BCID2 PCR.     BOTTLE TYPE Anaerobic Bottle    Respiratory Panel PCR w/COVID-19(SARS-CoV-2) MENDY/BERTHA/[facility]/PAD/COR/JERROD In-House, NP Swab in UTM/VTM, 2 HR TAT - Swab, Nasopharynx [[id number]]  (Normal) Collected: 07/23/25 1529    Lab Status: Final result Specimen: Swab from Nasopharynx Updated: 07/23/25 2309     ADENOVIRUS, PCR Not Detected     Coronavirus 229E Not Detected     Coronavirus HKU1 Not Detected     Coronavirus NL63 Not Detected     Coronavirus OC43 Not Detected     COVID19 Not Detected     Human Metapneumovirus Not Detected     Human Rhinovirus/Enterovirus Not Detected     Influenza A PCR Not Detected     Influenza B PCR Not Detected     Parainfluenza Virus 1 Not Detected     Parainfluenza Virus 2 Not Detected     Parainfluenza Virus 3 Not Detected     Parainfluenza Virus 4 Not Detected     RSV, PCR Not Detected     Bordetella pertussis pcr Not Detected     Bordetella parapertussis PCR Not Detected     Chlamydophila pneumoniae PCR Not Detected     Mycoplasma pneumo by PCR Not Detected    Narrative:      In the setting of a positive respiratory panel with a viral infection PLUS a negative procalcitonin without other underlying concern for bacterial infection, consider observing off antibiotics or discontinuation of antibiotics and continue supportive care. If the respiratory panel is positive for atypical bacterial infection (Bordetella pertussis, Chlamydophila pneumoniae, or Mycoplasma pneumoniae), consider antibiotic de-escalation to target atypical bacterial infection.    Blood Culture - Blood, Arm, Left [[id number]]  (Normal) Collected: 07/23/25 1528    Lab Status: Preliminary result Specimen: Blood from Arm, Left Updated: 07/24/25 1545     Blood Culture No growth at 24 hours     Narrative:      Less than seven (7) mL's of blood was collected.  Insufficient quantity may yield false negative results.    COVID-19 and FLU A/B PCR, 1 HR TAT - Swab, Nasopharynx [[id number]]  (Normal) Collected: 07/23/25 1406    Lab Status: Final result Specimen: Swab from Nasopharynx Updated: 07/23/25 1428     COVID19 Not Detected     Influenza A PCR Not Detected     Influenza B PCR Not Detected            US GUIDED INJECT/ASP MEDIUM JOINT OR BURSA  Result Date: 7/24/2025  Impression: 1. Successful ultrasound-guided aspiration of right knee soft tissue fluid collection yielding 60 mL of purulent fluid. Specimen collected for lab examination per the ordering clinician. 2. Successful ultrasound-guided aspiration of right suprapatellar joint space yielding 10 mL of bloody purulent fluid. Specimen collected for lab examination per the ordering clinician. Report dictated by: Kingsley Chakraborty DNP  I have personally reviewed this case and agree with the findings above: Electronically Signed: Nathan Mcdowell MD  7/24/2025 3:39 PM EDT  Workstation ID: [id number]    IR Puncture asp/abs/hema/cyst  Result Date: 7/24/2025  Impression: 1. Successful ultrasound-guided aspiration of right knee soft tissue fluid collection yielding 60 mL of purulent fluid. Specimen collected for lab examination per the ordering clinician. 2. Successful ultrasound-guided aspiration of right suprapatellar joint space yielding 10 mL of bloody purulent fluid. Specimen collected for lab examination per the ordering clinician. Report dictated by: Kingsley Chakraborty DNP  I have personally reviewed this case and agree with the findings above: Electronically Signed: Nathan Mcdowell MD  7/24/2025 3:39 PM EDT  Workstation ID: [id number]    IR LUMBAR PUNCTURE DIAGNOSTIC  Result Date: 7/24/2025  Impression: Successful diagnostic lumbar puncture utilizing fluoroscopic guidance and yielding 9 mL of clear colorless CSF. Specimens were collected for lab examination per the ordering  clinician. Report dictated by: Kingsley Chakraborty DNP  I have personally reviewed this case and agree with the findings above: Electronically Signed: Nathan Mcdowell MD  7/24/2025 3:39 PM EDT  Workstation ID: [id number]    CT Head With & Without Contrast  Result Date: 7/24/2025  Impression: Impression: No acute process identified. Electronically Signed: Esequiel Mcgraw MD  7/24/2025 2:13 PM EDT  Workstation ID: [id number]    CT Lower Extremity Right With Contrast  Result Date: 7/24/2025  Impression: Impression: There is a large joint effusion with small foci of gas. There is extension from the joint fluid through a defect in the lateral patellar retinaculum into the subcutaneous tissues in the prepatellar and infrapatellar location. Through this defect there is  extension of fluid and gas collection into the subcutaneous tissues. This measures 6.8 x 2.3 x 11.2 cm and is suspicious for abscess. Aspiration analysis of fluid is recommended. The connectivity with the joint fluid is suspicious for septic arthritis. No CT findings of osteomyelitis at this time. There is a patellofemoral compartment arthroplasty. Electronically Signed: Joellen Balderas MD  7/24/2025 10:31 AM EDT  Workstation ID: [id number]    CT Chest With Contrast Diagnostic  Result Date: 7/24/2025  Impression: 1.No active disease in the chest. There is a stable 4 mm right upper lobe pulmonary nodule. In the absence of risk factors for malignancy, no follow-up is indicated. Otherwise, follow-up could be obtained in 12 months. 2.Hepatic steatosis. 3.Cholecystectomy without biliary obstruction. 4.Probable mild small bowel ileus without obstruction. Normal large bowel and appendix. 5.Normal nonobstructed kidneys. 6.Bilateral inguinal adenopathy, possibly reactive. Continued follow-up recommended. No other adenopathy is identified in the chest, abdomen, or pelvis. Electronically Signed: Zachary Barakat MD  7/24/2025 2:36 AM EDT  Workstation ID: [id number]    CT Abdomen Pelvis With  Contrast  Result Date: 7/24/2025  Impression: 1.No active disease in the chest. There is a stable 4 mm right upper lobe pulmonary nodule. In the absence of risk factors for malignancy, no follow-up is indicated. Otherwise, follow-up could be obtained in 12 months. 2.Hepatic steatosis. 3.Cholecystectomy without biliary obstruction. 4.Probable mild small bowel ileus without obstruction. Normal large bowel and appendix. 5.Normal nonobstructed kidneys. 6.Bilateral inguinal adenopathy, possibly reactive. Continued follow-up recommended. No other adenopathy is identified in the chest, abdomen, or pelvis. Electronically Signed: Zachary Barakat MD  7/24/2025 2:36 AM EDT  Workstation ID: [id number]    XR Chest 1 View  Result Date: 7/23/2025  Impression: Impression: No acute cardiopulmonary findings. Electronically Signed: Jasper Dodson MD  7/23/2025 3:54 PM EDT  Workstation ID: [id number]    XR Knee 3 View Right  Result Date: 7/23/2025  Impression: Impression: Status post patellar resurfacing. Mild to moderate prepatellar soft tissue swelling. No acute osseous abnormality Electronically Signed: Pelon Bear MD  7/23/2025 3:54 PM EDT  Workstation ID: [id number]      Results for orders placed during the hospital encounter of 03/04/25    Venous w Reflux Lower Extremity - Bilateral CAR 03/04/2025  4:58 PM    Interpretation Summary    There is superficial venous insufficiency of the left lower extremity in the following vein(s): great saphenous at thigh.    All other veins appear normal, bilaterally.      Results for orders placed during the hospital encounter of 03/04/25    Venous w Reflux Lower Extremity - Bilateral CAR 03/04/2025  4:58 PM    Interpretation Summary    There is superficial venous insufficiency of the left lower extremity in the following vein(s): great saphenous at thigh.    All other veins appear normal, bilaterally.      Results for orders placed during the hospital encounter of 07/23/25    Adult Transthoracic  Echo Complete W/ Cont if Necessary Per Protocol 07/24/2025  8:26 AM    Interpretation Summary    Left ventricular systolic function is normal. Left ventricular ejection fraction appears to be 56 - 60%.    Left ventricular diastolic function was normal.      Labs Pending at Discharge:  Pending Results       Procedure [Order ID] Specimen - Date/Time    AFB Culture - Cerebrospinal Fluid, Lumbar Puncture [[id number]] Collected: 07/24/25 1502    Specimen: Cerebrospinal Fluid from Lumbar Puncture Updated: 07/24/25 1520    Autoimmune Encephalopathy Profile, CSF - Cerebrospinal Fluid, Lumbar Puncture [[id number]] Collected: 07/24/25 1502    Specimen: Cerebrospinal Fluid from Lumbar Puncture Updated: 07/24/25 1520    Vancomycin, Trough [[id number]]     Specimen: Blood             Procedures Performed         Consults:   Consults       Date and Time Order Name Status Description    7/24/2025 12:56 PM Inpatient Infectious Diseases Consult Completed     7/24/2025 11:14 AM Inpatient Neurology Consult General Completed     7/24/2025  8:30 AM Inpatient Orthopedic Surgery Consult Completed             Discharge Details        Discharge Medications        New Medications        Instructions Start Date   cefTRIAXone 2,000 mg in sodium chloride 0.9 % 100 mL IVPB   2,000 mg, Intravenous, Every 24 Hours      diazePAM 10 MG tablet  Commonly known as: VALIUM   10 mg, Oral, Every 2 Hours PRN      enoxaparin sodium 40 MG/0.4ML solution prefilled syringe syringe  Commonly known as: LOVENOX   40 mg, Subcutaneous, Daily      morphine 2 MG/ML injection   2 mg, Intravenous, Every 4 Hours PRN      PHARMACY TO DOSE VANCOMYCIN   Not Applicable, Continuous PRN      thiamine 100 MG tablet  Commonly known as: VITAMIN B1   100 mg, Oral, Daily   Start Date: July 29, 2025     vancomycin 1250 mg in sodium chloride 0.9% 250 mL (CD)   1,250 mg, Intravenous, Every 12 Hours             Changes to Medications        Instructions Start Date   DULoxetine 60 MG  capsule  Commonly known as: CYMBALTA  What changed: You were already taking a medication with the same name, and this prescription was added. Make sure you understand how and when to take each.   60 mg, Oral, Every 12 Hours Scheduled      DULoxetine HCl 30 MG capsule  Commonly known as: DRIZALMA  What changed: Another medication with the same name was added. Make sure you understand how and when to take each.   60 mg, 2 Times Daily             Continue These Medications        Instructions Start Date   levothyroxine 25 MCG tablet  Commonly known as: SYNTHROID, LEVOTHROID   25 mcg, Oral, Daily      rosuvastatin 10 MG tablet  Commonly known as: Crestor   10 mg, Oral, Daily             Stop These Medications      lisinopril-hydrochlorothiazide 20-25 MG per tablet  Commonly known as: PRINZIDE,ZESTORETIC     metFORMIN  MG 24 hr tablet  Commonly known as: GLUCOPHAGE-XR              Allergies   Allergen Reactions    Lyrica [Pregabalin] Other (See Comments)     Chest pain       Discharge Disposition:   [facility]    Diet:  Diet Instructions       Diet: Cardiac Diets, Diabetic Diets; Low Sodium (2g); Regular (IDDSI 7); Thin (IDDSI 0); Consistent Carbohydrate      Discharge Diet:  Cardiac Diets  Diabetic Diets       Cardiac Diet: Low Sodium (2g)    Texture: Regular (IDDSI 7)    Fluid Consistency: Thin (IDDSI 0)    Diabetic Diet: Consistent Carbohydrate    Diet: Diabetic Diets, Cardiac Diets; Low Sodium (2g); Regular (IDDSI 7); Thin (IDDSI 0); Consistent Carbohydrate      Discharge Diet:  Diabetic Diets  Cardiac Diets       Cardiac Diet: Low Sodium (2g)    Texture: Regular (IDDSI 7)    Fluid Consistency: Thin (IDDSI 0)    Diabetic Diet: Consistent Carbohydrate            Discharge Activity:   Activity Instructions       Bedrest With Bathroom Privileges              CODE STATUS:  Code Status and Medical Interventions: CPR (Attempt to Resuscitate); Full Support   Ordered at: 07/23/25 2021     Code Status (Patient has  no pulse and is not breathing):    CPR (Attempt to Resuscitate)     Medical Interventions (Patient has pulse or is breathing):    Full Support     Level Of Support Discussed With:    Patient       Future Appointments   Date Time Provider Department Center   1/6/2026 11:00 AM Brenda Song APRN [facility]   4/22/2026 10:30 AM Jessica Pichardo MD [facility]       Additional Instructions for the Follow-ups that You Need to Schedule       Discharge Follow-up with PCP   As directed       Currently Documented PCP:    Paras Worthy MD    PCP Phone Number:    [phone]     Follow Up Details: 2-3d                Time spent on Discharge including face to face service:  >30 minutes    Signature: Electronically signed by Lee Lopes MD, 07/25/25, 08:46 EDT.  [facility]ist Team

## 2025-07-25 NOTE — SIGNIFICANT NOTE
Case Management/Social Work    Patient Name:  Timo Hector  YOB: 1975  MRN: [id number]  Admit Date:  7/23/2025 07/25/25 0842   Transportation Services   Transportation Ambulance   Ambulance [facility]   [facility] Status Accepted           Electronically signed by:  Ernestine Fuentes RN  07/25/25 08:42 EDT    Office Phone: [phone]  Office Cell:     [phone]

## 2025-07-25 NOTE — CASE MANAGEMENT/SOCIAL WORK
Case Management Discharge Note      Final Note: CM notified by charge nurse that [facility] has bed ready for patient this morning on [address]. CM completed medical necessity and delivered to bedside nurse. EMS request placed per charge nurse. No additional needs identified.      Transportation Services  Transportation: Ambulance  Ambulance: [facility]  [facility] Status: Accepted    Final Discharge Disposition Code: 02 - [facility]    Ernestine Fuentes RN    Office Phone: [phone]  Office Cell:     [phone]

## 2025-07-25 NOTE — NURSING NOTE
Mary with [facility] transfer center called to inform of bed being ready. Report called to [facility] on 5[address].EMS unable to transport pt due to need for medication reconciliation.  Physician unable to complete due to system maintenance. [facility] updated. Bed will be held until 7/25 08:00 per transfer center.

## 2025-07-27 ENCOUNTER — RESULTS FOLLOW-UP (OUTPATIENT)
Facility: HOSPITAL | Age: 50
End: 2025-07-27
Payer: COMMERCIAL

## 2025-07-27 LAB
AMPAR1 AB CSF QL CBA IFA: NEGATIVE
AMPAR2 IGG CSF QL CBA IFA: NEGATIVE
AMPHIPHYSIN AB CSF QL IF: NEGATIVE
BACTERIA FLD CULT: ABNORMAL
BACTERIA SPEC AEROBE CULT: ABNORMAL
BACTERIA SPEC AEROBE CULT: NORMAL
BACTERIA SPEC ANAEROBE CULT: NORMAL
BACTERIA SPEC ANAEROBE CULT: NORMAL
CASPR2 AB CSF QL CBA IFA: NEGATIVE
CV2 AB CSF QL IF: NEGATIVE
DPPX IGG CSF QL IF: NEGATIVE
GABABR IGG CSF QL CBA IFA: NEGATIVE
GAD65 AB CSF IA-SCNC: NEGATIVE NMOL/L
GLIAL NUC TYPE 1 AB CSF QL IF: NEGATIVE
GRAM STN SPEC: ABNORMAL
GRAM STN SPEC: NORMAL
HU1 AB CSF QL IF: NEGATIVE
HU2 AB CSF QL IF: NEGATIVE
HU3 AB CSF QL IF: NEGATIVE
IGLON5 IGG CSF QL CBA IFA: NEGATIVE
IMP & REVIEW OF LAB RESULTS: NEGATIVE
IP3R 1 IGG CSF QL IF: NEGATIVE
ISOLATED FROM: ABNORMAL
LGI1 AB CSF QL CBA IFA: NEGATIVE
MA+TA AB CSF QL IF: NEGATIVE
MGLUR1 IGG CSF QL CBA IFA: NEGATIVE
NMDAR IGG CSF QL IF: NEGATIVE
PCA-2 AB CSF QL IF: NEGATIVE
PCA-TR AB CSF QL CBA IFA: NEGATIVE
PCA-TR AB CSF QL IF: NEGATIVE
PURKINJE CELLS AB CSF QL IF: NEGATIVE
ZIC4 AB SER QL IB: NEGATIVE

## 2025-07-27 NOTE — TELEPHONE ENCOUNTER
Patient's blood culture and synovial fluid culture came back positive for Streptococcus dysgalactiae Sensitive to ceftriaxone, vanc, pen G.Called [facility] and talked to pt's nurse Kathy.will forward results to pt's provider. Pt already on ceft/ vanc

## 2025-07-28 LAB — BACTERIA SPEC AEROBE CULT: NORMAL

## 2025-07-29 LAB
BACTERIA SPEC ANAEROBE CULT: NORMAL
BACTERIA SPEC ANAEROBE CULT: NORMAL

## 2025-07-31 LAB
MYCOBACTERIUM SPEC CULT: NORMAL
NIGHT BLUE STAIN TISS: NORMAL

## 2025-08-04 RX ORDER — LISINOPRIL AND HYDROCHLOROTHIAZIDE 20; 25 MG/1; MG/1
1 TABLET ORAL DAILY
Qty: 30 TABLET | Refills: 1 | OUTPATIENT
Start: 2025-08-04

## 2025-08-07 LAB
MYCOBACTERIUM SPEC CULT: NORMAL
NIGHT BLUE STAIN TISS: NORMAL

## 2025-08-14 LAB
MYCOBACTERIUM SPEC CULT: NORMAL
NIGHT BLUE STAIN TISS: NORMAL

## 2025-08-21 LAB
MYCOBACTERIUM SPEC CULT: NORMAL
NIGHT BLUE STAIN TISS: NORMAL

## 2025-08-21 RX ORDER — METFORMIN HYDROCHLORIDE 500 MG/1
500 TABLET, EXTENDED RELEASE ORAL
Qty: 90 TABLET | Refills: 1 | OUTPATIENT
Start: 2025-08-21 | End: 2026-02-17

## 2025-08-28 LAB
MYCOBACTERIUM SPEC CULT: NORMAL
NIGHT BLUE STAIN TISS: NORMAL

## 2025-08-28 RX ORDER — ROSUVASTATIN CALCIUM 10 MG/1
10 TABLET, COATED ORAL DAILY
Qty: 90 TABLET | Refills: 1 | Status: SHIPPED | OUTPATIENT
Start: 2025-08-28

## 2025-08-28 RX ORDER — METFORMIN HYDROCHLORIDE 500 MG/1
500 TABLET, EXTENDED RELEASE ORAL
Qty: 90 TABLET | Refills: 1 | Status: SHIPPED | OUTPATIENT
Start: 2025-08-28 | End: 2026-02-24